# Patient Record
(demographics unavailable — no encounter records)

---

## 2018-08-03 NOTE — PULMONOLOGY PROGRESS NOTE
Assessment/Plan


Assessment/Plan


HPI


HPI


Patient is a 66-year-old male who presented after increased difficulty 

breathing and generalized weakness.  Patient gradual onset of symptoms.  He 

reports having prior history of COPD.  Patient reports recently stopping 

smoking.  But he states he been smoking up until a few days ago.  He reports 

having increased cough.  Reports having increased generalized weakness.  He 

states he has some unknown type of cardiac arrhythmia.  He's had previous 

cardiac catheterization.


Allergies:  


Coded Allergies:  


     AMPICILLIN (Verified  Allergy, Unknown, 8/3/18)


Uncoded Allergies:  


     PENICILLIN (Allergy, Unknown, 8/3/18)





Patient History


Past Medical History:  COPD


Reviewed Nursing Documentation:  PMH: Agreed; PSxH: Agreed





Nursing Documentation-PMH


Past Medical History:  No History, Except For


Hx Cardiac Problems:  Yes - arrhythmia


Hx Hypertension:  No


Hx Pacemaker:  No


Hx Asthma:  Yes


Hx COPD:  Yes


Hx Diabetes:  No


Hx Cancer:  No


Hx Gastrointestinal Problems:  No


Hx Dialysis:  No


History Of Psychiatric Problem:  No


Hx Neurological Problems:  No


Hx Cerebrovascular Accident:  No


Hx Seizures:  No





Review of Systems


All Other Systems:  negative except mentioned in HPI





Physical Exam





Vital Signs








  Date Time  Temp Pulse Resp B/P (MAP) Pulse Ox O2 Delivery O2 Flow Rate FiO2


 


8/3/18 13:53 96.0 127 22 133/66 86 Room Air  





 96.1       








Sp02 EP Interpretation:  reviewed, normal


General Appearance:  normal inspection, alert, GCS 15, moderate distress, thin, 

Chronically Ill


Head:  atraumatic


ENT:  normal ENT inspection, hearing grossly normal, normal voice


Neck:  normal inspection, supple, no bony tend, limited range of motion


Respiratory:  respiratory distress, accessory muscle use, wheezing


Cardiovascular #1:  no edema, irregularly irregular


Gastrointestinal:  normal inspection, normal bowel sounds, non tender, soft, no 

guarding, no hernia


Genitourinary:  no CVA tenderness


Musculoskeletal:  normal inspection, back normal, normal range of motion


Neurologic:  normal inspection, alert, oriented x3, responsive, speech normal


Psychiatric:  normal inspection, judgement/insight normal, mood/affect normal


Skin:  normal inspection, normal color, no rash





Medical Decision Making


ER Course


Patient is a for generalized weakness and shortness of breath.  Differential 

included but was not limited to anemia, pneumonia, pneumothorax, myocardial 

infarction, pericardial effusion, congestive heart failure, acidosis.  Because 

of complexity of patient's case laboratory testing and imaging studies were 

ordered.





Last Vital Signs








  Date Time  Temp Pulse Resp B/P (MAP) Pulse Ox O2 Delivery O2 Flow Rate FiO2


 


8/3/18 13:53 96.0 127 22 133/66 86 Room Air  





 96.1       








Assessment/Plan








#Acute decompensated CHF


#COPD exacerbation 


#Possible Viral URI


#Transaminitis (? hepatic congestion)


#HTN


#HLD





- diuresis PRN


- monitor labs


- PTA meds


- nebs q4h atc


- prednisone 40 po daily x 5 days 


- supplemental 02 sats 90-94%


- supportive care


- dvt ppx: SQ Heparin 5000 BID


- full code








I spent 75 min on this case, 43 min was dedicated to counseling and care 

coordination





Subjective


ROS Limited/Unobtainable:  No


Respiratory:  Reports: shortness of breath


Allergies:  


Coded Allergies:  


     AMPICILLIN (Verified  Allergy, Unknown, 8/3/18)


Uncoded Allergies:  


     PENICILLIN (Allergy, Unknown, 8/3/18)





Objective





Last 24 Hour Vital Signs








  Date Time  Temp Pulse Resp B/P (MAP) Pulse Ox O2 Delivery O2 Flow Rate FiO2


 


8/3/18 17:57 96.1       


 


8/3/18 16:35 96.1 79 21 112/65 96 Room Air  





 96.1       


 


8/3/18 14:51  86 20  98 Room Air  


 


8/3/18 14:41  84 22  92 Room Air  


 


8/3/18 14:40  84 22   Room Air  


 


8/3/18 14:10 96.1 89 29 112/76 97 Room Air  





 96.1       


 


8/3/18 13:53 96.0 127 22 133/66 86 Room Air  





 96.1       








Laboratory Tests


8/3/18 14:22: 


White Blood Count 4.9, Red Blood Count 5.33, Hemoglobin 14.7, Hematocrit 44.2, 

Mean Corpuscular Volume 83, Mean Corpuscular Hemoglobin 27.5, Mean Corpuscular 

Hemoglobin Concent 33.2, Red Cell Distribution Width 11.8, Platelet Count 101L, 

Mean Platelet Volume 8.9, Neutrophils (%) (Auto) 81.2H, Lymphocytes (%) (Auto) 

9.4L, Monocytes (%) (Auto) 8.7, Eosinophils (%) (Auto) 0.0, Basophils (%) (Auto

) 0.7, Prothrombin Time 11.0, Prothromb Time International Ratio 1.0, Activated 

Partial Thromboplast Time 32, Sodium Level 136, Potassium Level 3.8, Chloride 

Level 97L, Carbon Dioxide Level 32, Anion Gap 7, Blood Urea Nitrogen 30H, 

Creatinine 1.3, Estimat Glomerular Filtration Rate 55.2, Glucose Level 121H, 

Calcium Level 8.9, Total Bilirubin 3.1H, Direct Bilirubin 2.2H, Aspartate Amino 

Transf (AST/SGOT) 68H, Alanine Aminotransferase (ALT/SGPT) 112H, Alkaline 

Phosphatase 179H, Troponin I 0.035, C-Reactive Protein, Quantitative 18.0H, Pro-

B-Type Natriuretic Peptide 2106H, Total Protein 7.2, Albumin 3.1L, Globulin 4.1

, Albumin/Globulin Ratio 0.8L, Thyroid Stimulating Hormone (TSH) 2.071


8/3/18 16:00: 


Urine Color Yellow, Urine Appearance Clear, Urine pH 6.5, Urine Specific 

Gravity 1.010, Urine Protein Negative, Urine Glucose (UA) Negative, Urine 

Ketones Negative, Urine Occult Blood Negative, Urine Nitrite Negative, Urine 

Bilirubin Negative, Urine Urobilinogen 4H, Urine Leukocyte Esterase Negative, 

Urine RBC 0, Urine WBC 0-2, Urine Squamous Epithelial Cells None, Urine 

Bacteria None, Urine Opiates Screen Negative, Urine Barbiturates Screen Negative

, Phencyclidine (PCP) Screen Negative, Urine Amphetamines Screen Negative, 

Urine Benzodiazepines Screen Negative, Urine Cocaine Screen Negative, Urine 

Marijuana (THC) Screen PositiveH





Current Medications








 Medications


  (Trade)  Dose


 Ordered  Sig/Marissa


 Route


 PRN Reason  Start Time


 Stop Time Status Last Admin


Dose Admin


 


 Acetaminophen


  (Tylenol)  650 mg  Q4H  PRN


 ORAL


 Mild Pain (Pain Scale 1-3)  8/3/18 16:00


 9/2/18 15:59  8/3/18 17:57


 


 


 Acetaminophen


  (Tylenol)  650 mg  Q4H  PRN


 ORAL


 fever  8/3/18 16:00


 9/2/18 15:59   


 


 


 Albuterol/


 Ipratropium


  (Albuterol/


 Ipratropium)  3 ml  Q4HRT


 HHN


   8/3/18 16:15


 8/8/18 16:14   


 


 


 Aspirin


  (ASA)  81 mg  DAILY


 ORAL


   8/4/18 09:00


 9/3/18 08:59   


 


 


 Atorvastatin


 Calcium


  (Lipitor)  80 mg  BEDTIME


 ORAL


   8/3/18 21:00


 9/2/18 20:59   


 


 


 Carvedilol


  (Coreg)  12.5 mg  EVERY 12  HOURS


 ORAL


   8/3/18 21:00


 9/2/18 20:59   


 


 


 Dextrose


  (Dextrose 50%)  25 ml  STAT  PRN


 IV


 Hypoglycemia  8/3/18 16:00


 9/2/18 15:59   


 


 


 Dextrose


  (Dextrose 50%)  50 ml  STAT  PRN


 IV


 Hypoglycemia  8/3/18 16:00


 9/2/18 15:59   


 


 


 Docusate Sodium


  (Colace)  100 mg  EVERY 12  HOURS


 ORAL


   8/3/18 21:00


 9/2/18 20:59   


 


 


 Famotidine


  (Pepcid)  20 mg  BID


 ORAL


   8/3/18 18:00


 9/2/18 17:59  8/3/18 17:49


 


 


 Heparin Sodium


  (Porcine)


  (Heparin 5000


 units/ml)  5,000 units  EVERY 12  HOURS


 SUBQ


   8/3/18 21:00


 9/2/18 20:59   


 


 


 Morphine Sulfate


  (Morphine


 Sulfate)  2 mg  Q4H  PRN


 IVP


 Moderate Pain (Pain Scale 4-6)  8/3/18 16:00


 8/10/18 15:59   


 


 


 Morphine Sulfate


  (Morphine


 Sulfate)  4 mg  Q4H  PRN


 IVP


 Severe Pain (Pain Scale 7-10)  8/3/18 16:00


 8/10/18 15:59   


 


 


 Ondansetron HCl


  (Zofran)  4 mg  Q6H  PRN


 IVP


 Nausea & Vomiting  8/3/18 16:00


 9/2/18 15:59   


 


 


 Prednisone


  (predniSONE)  40 mg  DAILY


 ORAL


   8/4/18 09:00


 8/8/18 09:01   


 

















Jonathan Allred MD Aug 3, 2018 18:41

## 2018-08-03 NOTE — EMERGENCY ROOM REPORT
History of Present Illness


General


Chief Complaint:  Generalized Weakness


Source:  Patient





Present Illness


HPI


Patient is a 66-year-old male who presented after increased difficulty 

breathing and generalized weakness.  Patient gradual onset of symptoms.  He 

reports having prior history of COPD.  Patient reports recently stopping 

smoking.  But he states he been smoking up until a few days ago.  He reports 

having increased cough.  Reports having increased generalized weakness.  He 

states he has some unknown type of cardiac arrhythmia.  He's had previous 

cardiac catheterization.


Allergies:  


Coded Allergies:  


     AMPICILLIN (Verified  Allergy, Unknown, 8/3/18)


Uncoded Allergies:  


     PENICILLIN (Allergy, Unknown, 8/3/18)





Patient History


Past Medical History:  COPD


Reviewed Nursing Documentation:  PMH: Agreed; PSxH: Agreed





Nursing Documentation-PMH


Past Medical History:  No History, Except For


Hx Cardiac Problems:  Yes - arrhythmia


Hx Hypertension:  No


Hx Pacemaker:  No


Hx Asthma:  Yes


Hx COPD:  Yes


Hx Diabetes:  No


Hx Cancer:  No


Hx Gastrointestinal Problems:  No


Hx Dialysis:  No


History Of Psychiatric Problem:  No


Hx Neurological Problems:  No


Hx Cerebrovascular Accident:  No


Hx Seizures:  No





Review of Systems


All Other Systems:  negative except mentioned in HPI





Physical Exam





Vital Signs








  Date Time  Temp Pulse Resp B/P (MAP) Pulse Ox O2 Delivery O2 Flow Rate FiO2


 


8/3/18 13:53 96.0 127 22 133/66 86 Room Air  





 96.1       








Sp02 EP Interpretation:  reviewed, normal


General Appearance:  normal inspection, alert, GCS 15, moderate distress, thin, 

Chronically Ill


Head:  atraumatic


ENT:  normal ENT inspection, hearing grossly normal, normal voice


Neck:  normal inspection, supple, no bony tend, limited range of motion


Respiratory:  respiratory distress, accessory muscle use, wheezing


Cardiovascular #1:  no edema, irregularly irregular


Gastrointestinal:  normal inspection, normal bowel sounds, non tender, soft, no 

guarding, no hernia


Genitourinary:  no CVA tenderness


Musculoskeletal:  normal inspection, back normal, normal range of motion


Neurologic:  normal inspection, alert, oriented x3, responsive, speech normal


Psychiatric:  normal inspection, judgement/insight normal, mood/affect normal


Skin:  normal inspection, normal color, no rash





Medical Decision Making


Diagnostic Impression:  


 Primary Impression:  


 Episode of generalized weakness


 Additional Impressions:  


 COPD (chronic obstructive pulmonary disease)


 CHF (congestive heart failure)


 Atrial fibrillation


ER Course


Patient is a for generalized weakness and shortness of breath.  Differential 

included but was not limited to anemia, pneumonia, pneumothorax, myocardial 

infarction, pericardial effusion, congestive heart failure, acidosis.  Because 

of complexity of patient's case laboratory testing and imaging studies were 

ordered.Patient was given IV Lasix as well as oral antibiotics the patient was 

given breathing treatments as well as IV magnesium.  Patient was discussed with 

Dr. Raghav Alejo for Dr. Louise for inpatient management





Labs








Test


  8/3/18


14:22 8/3/18


16:00 8/3/18


19:45


 


White Blood Count


  4.9 K/UL


(4.8-10.8) 


  


 


 


Red Blood Count


  5.33 M/UL


(4.70-6.10) 


  


 


 


Hemoglobin


  14.7 G/DL


(14.2-18.0) 


  


 


 


Hematocrit


  44.2 %


(42.0-52.0) 


  


 


 


Mean Corpuscular Volume 83 FL (80-99)   


 


Mean Corpuscular Hemoglobin


  27.5 PG


(27.0-31.0) 


  


 


 


Mean Corpuscular Hemoglobin


Concent 33.2 G/DL


(32.0-36.0) 


  


 


 


Red Cell Distribution Width


  11.8 %


(11.6-14.8) 


  


 


 


Platelet Count


  101 K/UL


(150-450) 


  


 


 


Mean Platelet Volume


  8.9 FL


(6.5-10.1) 


  


 


 


Neutrophils (%) (Auto)


  81.2 %


(45.0-75.0) 


  


 


 


Lymphocytes (%) (Auto)


  9.4 %


(20.0-45.0) 


  


 


 


Monocytes (%) (Auto)


  8.7 %


(1.0-10.0) 


  


 


 


Eosinophils (%) (Auto)


  0.0 %


(0.0-3.0) 


  


 


 


Basophils (%) (Auto)


  0.7 %


(0.0-2.0) 


  


 


 


Prothrombin Time


  11.0 SEC


(9.30-11.50) 


  


 


 


Prothromb Time International


Ratio 1.0 (0.9-1.1) 


  


  


 


 


Activated Partial


Thromboplast Time 32 SEC (23-33) 


  


  


 


 


Sodium Level


  136 MMOL/L


(136-145) 


  


 


 


Potassium Level


  3.8 MMOL/L


(3.5-5.1) 


  


 


 


Chloride Level


  97 MMOL/L


() 


  


 


 


Carbon Dioxide Level


  32 MMOL/L


(21-32) 


  


 


 


Anion Gap


  7 mmol/L


(5-15) 


  


 


 


Blood Urea Nitrogen


  30 mg/dL


(7-18) 


  


 


 


Creatinine


  1.3 MG/DL


(0.55-1.30) 


  


 


 


Estimat Glomerular Filtration


Rate 55.2 mL/min


(>60) 


  


 


 


Glucose Level


  121 MG/DL


() 


  


 


 


Calcium Level


  8.9 MG/DL


(8.5-10.1) 


  


 


 


Total Bilirubin


  3.1 MG/DL


(0.2-1.0) 


  


 


 


Direct Bilirubin


  2.2 MG/DL


(0.0-0.3) 


  


 


 


Aspartate Amino Transf


(AST/SGOT) 68 U/L (15-37) 


  


  


 


 


Alanine Aminotransferase


(ALT/SGPT) 112 U/L


(12-78) 


  


 


 


Alkaline Phosphatase


  179 U/L


() 


  


 


 


Troponin I


  0.035 ng/mL


(0.000-0.056) 


  


 


 


C-Reactive Protein,


Quantitative 18.0 mg/dL


(0.00-0.90) 


  


 


 


Pro-B-Type Natriuretic Peptide


  2106 pg/mL


(0-125) 


  


 


 


Total Protein


  7.2 G/DL


(6.4-8.2) 


  


 


 


Albumin


  3.1 G/DL


(3.4-5.0) 


  


 


 


Globulin 4.1 g/dL   


 


Albumin/Globulin Ratio 0.8 (1.0-2.7)   


 


Thyroid Stimulating Hormone


(TSH) 2.071 uiU/mL


(0.358-3.740) 


  


 


 


Urine RBC  0 /HPF (0 - 0)  


 


Urine WBC


  


  0-2 /HPF (0 -


0) 


 


 


Urine Squamous Epithelial


Cells 


  None /LPF


(NONE/OCC) 


 


 


Urine Bacteria


  


  None /HPF


(NONE) 


 


 


Urine Opiates Screen


  


  Negative


(NEGATIVE) 


 


 


Urine Barbiturates Screen


  


  Negative


(NEGATIVE) 


 


 


Phencyclidine (PCP) Screen


  


  Negative


(NEGATIVE) 


 


 


Urine Amphetamines Screen


  


  Negative


(NEGATIVE) 


 


 


Urine Benzodiazepines Screen


  


  Negative


(NEGATIVE) 


 


 


Urine Cocaine Screen


  


  Negative


(NEGATIVE) 


 


 


Urine Marijuana (THC) Screen


  


  Positive


(NEGATIVE) 


 


 


Urine Color   Pale yellow 


 


Urine Appearance   Clear 


 


Urine pH   6.5 (4.5-8.0) 


 


Urine Specific Gravity


  


  


  1.005


(1.005-1.035)


 


Urine Protein


  


  


  Negative


(NEGATIVE)


 


Urine Glucose (UA)   2+ (NEGATIVE) 


 


Urine Ketones


  


  


  Negative


(NEGATIVE)


 


Urine Occult Blood


  


  


  Negative


(NEGATIVE)


 


Urine Nitrite


  


  


  Negative


(NEGATIVE)


 


Urine Bilirubin


  


  


  Negative


(NEGATIVE)


 


Urine Urobilinogen


  


  


  Normal MG/DL


(0.0-1.0)


 


Urine Leukocyte Esterase


  


  


  Negative


(NEGATIVE)








EKG Diagnostic Results


Rate:  other - atrial fibrillation





Last Vital Signs








  Date Time  Temp Pulse Resp B/P (MAP) Pulse Ox O2 Delivery O2 Flow Rate FiO2


 


8/3/18 13:53 96.0 127 22 133/66 86 Room Air  





 96.1       








Status:  unchanged


Disposition:  ELOPED


Condition:  Stable











Chaz Jackson MD Aug 3, 2018 14:21

## 2018-08-03 NOTE — HISTORY AND PHYSICAL
History of Present Illness


General


Date patient seen:  Aug 3, 2018


Reason for Hospitalization:  Generalized Weakness





Present Illness


HPI


67 yo M w/ Hx MMP including COPD, CHF who presents to the ED w/ 1 mo of 

worsening Generalized weakness and one week of increased shortness of breath 

and FRITZ. Pt has been living in a board and [facility] and reports limited access to 

healthy food. Food provided at the facility is not c/w recommendations he has 

been given for a cardiac/low na diet. Over the past 2 days he has had little po 

intake due to anorexia and general malaise. At baseline he is able to walk for 

> 1 block on flat ground, but now is limited to 20-30 feet. He also reports 

Orthopnea. Pt felt like today his overall health had signifigantly declined 

prompting him to present to the ED. 





In the ED pt was found to be in decompensated CHF w/ elevated BNP and COPD 

exacerbation w/ exp wheeze. He was started on lasix IV, Steroids and nebs with 

some improvement in sob. 





Social Hx:


Lives in board and care


Denies ETOH use


Active smoker, 1 PPD, > 40 pack yr Hx (has not smoked in 3 days)


Allergies:  


Coded Allergies:  


     AMPICILLIN (Verified  Allergy, Unknown, 8/3/18)


Uncoded Allergies:  


     PENICILLIN (Allergy, Unknown, 8/3/18)





Medication History


Scheduled


Carvedilol (Coreg), 12.5 MG ORAL EVERY 12 HOURS, (Reported)


Furosemide (Furosemide), 40 MG PO DAILY, (Reported)


Rosuvastatin Calcium* (Crestor*), 5 MG ORAL DAILY, (Reported)





Patient History


Healthcare decision maker


N


Resuscitation status





Advanced Directive on File








Past Medical/Surgical History


Past Medical/Surgical History:  


(1) COPD (chronic obstructive pulmonary disease)


(2) Atrial fibrillation


(3) CHF (congestive heart failure)


(4) Episode of generalized weakness





Review of Systems


Constitutional:  Reports: malaise, weakness; Denies: no symptoms, see HPI, 

chills, sweats, fever, other


Eye:  Denies: no symptoms, see HPI, eye pain, blurred vision, tearing, double 

vision, nose pain, nose congestion, acuity changes, discharge, other


Respiratory:  Reports: see HPI, cough, orthopnea, shortness of breath, wheezing

, sputum; Denies: no symptoms, stridor, FRITZ, other


Cardiovascular:  Reports: see HPI, PND; Denies: no symptoms, chest pain, edema, 

palpitations, syncope, other


Gastrointestinal:  Reports: nausea; Denies: no symptoms, see HPI, abdominal pain

, constipation, diarrhea, vomiting, melena, hematemesis, other


Genitourinary:  Denies: no symptoms, see HPI, discharge, dysuria, frequency, 

hematuria, pain, retention, incontinence, urgency, vag bleed/dc, other


Musculoskeletal:  Denies: no symptoms, see HPI, back pain, gout, joint pain, 

joint swelling, muscle pain, muscle stiffness, other


Skin:  Denies: no symptoms, see HPI, rash, change in color, change in hair/nails

, dryness, lesions, other


Psychiatric:  Denies: no symptoms, see HPI, prior hx, anxiety, depressed 

feelings, emotional problems, SI, HI, hallucinations, other


Neurological:  Denies: no symptoms, see HPI, headache, numbness, paresthesia, 

seizure, tingling, tremors, focal weakness, syncope, dizziness, other


Endocrine:  Denies: no symptoms, see HPI, excessive sweating, flushing, 

intolerance to temperature, increased thirst, increased urine, unexplained 

weight loss, other


Hematologic/Lymphatic:  Denies: no symptoms, see HPI, anemia, blood clots, easy 

bleeding, easy bruising, swollen glands, diathesis, other





Physical Exam


General Appearance:  thin


HEENT:  normocephalic, atraumatic, anicteric, mucous membranes moist, PERRL


Neck:  non-tender, supple


Respiratory/Chest:  chest wall non-tender, lungs clear, no respiratory distress

, no accessory muscle use, expiratory wheezing


Cardiovascular/Chest:  normal rate, regular rhythm, regularly irregular, no 

gallop/murmur, JVD


Abdomen:  normal bowel sounds, non tender, soft, no organomegaly, no mass


Extremities:  normal range of motion, normal inspection, normal capillary refill


Neurologic:  CNs II-XII grossly normal, no motor/sensory deficits, abnormal gait

, alert, oriented x 3, normal mood/affect





Last 24 Hour Vital Signs








  Date Time  Temp Pulse Resp B/P (MAP) Pulse Ox O2 Delivery O2 Flow Rate FiO2


 


8/3/18 14:10 96.1 89 29 112/76 97 Room Air  





 96.1       


 


8/3/18 13:53 96.0 127 22 133/66 86 Room Air  





 96.1       











Laboratory Tests








Test


  8/3/18


14:22


 


White Blood Count


  4.9 K/UL


(4.8-10.8)


 


Red Blood Count


  5.33 M/UL


(4.70-6.10)


 


Hemoglobin


  14.7 G/DL


(14.2-18.0)


 


Hematocrit


  44.2 %


(42.0-52.0)


 


Mean Corpuscular Volume 83 FL (80-99)  


 


Mean Corpuscular Hemoglobin


  27.5 PG


(27.0-31.0)


 


Mean Corpuscular Hemoglobin


Concent 33.2 G/DL


(32.0-36.0)


 


Red Cell Distribution Width


  11.8 %


(11.6-14.8)


 


Platelet Count


  101 K/UL


(150-450)  L


 


Mean Platelet Volume


  8.9 FL


(6.5-10.1)


 


Neutrophils (%) (Auto)


  81.2 %


(45.0-75.0)  H


 


Lymphocytes (%) (Auto)


  9.4 %


(20.0-45.0)  L


 


Monocytes (%) (Auto)


  8.7 %


(1.0-10.0)


 


Eosinophils (%) (Auto)


  0.0 %


(0.0-3.0)


 


Basophils (%) (Auto)


  0.7 %


(0.0-2.0)


 


Prothrombin Time


  11.0 SEC


(9.30-11.50)


 


Prothromb Time International


Ratio 1.0 (0.9-1.1)  


 


 


Activated Partial


Thromboplast Time 32 SEC (23-33)


 


 


Sodium Level


  136 MMOL/L


(136-145)


 


Potassium Level


  3.8 MMOL/L


(3.5-5.1)


 


Chloride Level


  97 MMOL/L


()  L


 


Carbon Dioxide Level


  32 MMOL/L


(21-32)


 


Anion Gap


  7 mmol/L


(5-15)


 


Blood Urea Nitrogen


  30 mg/dL


(7-18)  H


 


Creatinine


  1.3 MG/DL


(0.55-1.30)


 


Estimat Glomerular Filtration


Rate 55.2 mL/min


(>60)


 


Glucose Level


  121 MG/DL


()  H


 


Calcium Level


  8.9 MG/DL


(8.5-10.1)


 


Total Bilirubin


  3.1 MG/DL


(0.2-1.0)  H


 


Direct Bilirubin


  2.2 MG/DL


(0.0-0.3)  H


 


Aspartate Amino Transf


(AST/SGOT) 68 U/L (15-37)


H


 


Alanine Aminotransferase


(ALT/SGPT) 112 U/L


(12-78)  H


 


Alkaline Phosphatase


  179 U/L


()  H


 


Troponin I


  0.035 ng/mL


(0.000-0.056)


 


C-Reactive Protein,


Quantitative 18.0 mg/dL


(0.00-0.90)  H


 


Pro-B-Type Natriuretic Peptide


  2106 pg/mL


(0-125)  H


 


Total Protein


  7.2 G/DL


(6.4-8.2)


 


Albumin


  3.1 G/DL


(3.4-5.0)  L


 


Globulin 4.1 g/dL  


 


Albumin/Globulin Ratio


  0.8 (1.0-2.7)


L


 


Thyroid Stimulating Hormone


(TSH) 2.071 uiU/mL


(0.358-3.740)








Height (Feet):  5


Height (Inches):  9.00


Weight (Pounds):  160


Medications





Current Medications








 Medications


  (Trade)  Dose


 Ordered  Sig/Marissa


 Route


 PRN Reason  Start Time


 Stop Time Status Last Admin


Dose Admin


 


 Albuterol/


 Ipratropium


  (Albuterol/


 Ipratropium)  3 ml  Q15M


 HHN


   8/3/18 14:30


 8/8/18 14:29  8/3/18 14:41


 











Assessment/Plan


Assessment/Plan


#Acute decompensated CHF


#COPD exacerbation 


#Transaminitis (? hepatic congestion)


#HTN


#HLD





- place in observation 


- cardiology consult


- telemetry monitor 


- trend tn/ekg


- lasix 40 IV BID, goal neg 1L / 24 hrs


- strict i/o's


- CHEM BID


- replace lytes as needed


- UA


- asa, coreg, statin 


- abd us


- Pulm consult 


- nebs q4h atc


- prednisone 40 po daily x 5 days 


- defer abx at this time


- repeat CXR in am 


- supplemental 02


- supportive care


- dvt ppx: HSQ BID


- full code





anticipate pt will require 1-2 days in house


anticipate dc home w/ HH vs SNF once medically stable





I spent 68 min on this case, 48 min was dedicated to counseling and care 

coordination





time of note may not reflect time of clinical encounter











Jay Alejo MD Aug 3, 2018 16:15

## 2018-08-03 NOTE — DIAGNOSTIC IMAGING REPORT
Indication: Shortness of breath

 

Technique: One view of the chest

 

Comparison: none

 

Findings: Lungs and pleural spaces are clear. Heart size is normal

 

Impression: No acute process

## 2018-08-04 NOTE — GENERAL PROGRESS NOTE
Assessment/Plan


Status:  progressing


Assessment/Plan


#Acute decompensated CHF - improved, diuresing 


#COPD exacerbation - improved on tx


#Transaminitis - improved, distended CBD on abd us


#Thrombocytopenia - new, no cirrhosis on US


#A fib


#MR


#HTN


#HLD





- admit to in pt


- cardiology consult, appreciate recs


- telemetry monitor 


- serial tn neg


- diuresis per cards


- strict i/o's


- trend chem


- replace lytes as needed


- UA non infectious


- apixaban BID


- plavix, coreg, statin 


- Pulm consult 


- doxy BID


- nebs q4h atc


- steroids per pulm


- supplemental 02


- trend LFTs


- GI consult 


- trend CBC (plt) 


- supportive care


- dvt ppx: HSQ BID


- full code





anticipate pt will require 2-3 days in house


anticipate dc home w/ HH vs SNF once medically stable





I spent 41 min on this case, 25 min was dedicated to counseling and care 

coordination





time of note may not reflect time of clinical encounter





Subjective


Date patient seen:  Aug 4, 2018


Allergies:  


Coded Allergies:  


     AMPICILLIN (Verified  Allergy, Unknown, 8/3/18)


Uncoded Allergies:  


     PENICILLIN (Allergy, Unknown, 8/3/18)


All Systems:  reviewed and negative except above - sob, general malaise


Subjective


no acute events


afeb and hds


evaluated by cards and pulm 


serial tn neg


LFTs improved


ABD us done





Objective





Last 24 Hour Vital Signs








  Date Time  Temp Pulse Resp B/P (MAP) Pulse Ox O2 Delivery O2 Flow Rate FiO2


 


8/4/18 11:37 96.3 86 19 121/57 (78) 99   





 96.3       


 


8/4/18 10:56  59 18  99 Room Air  


 


8/4/18 10:46  95 18  98 Room Air  21


 


8/4/18 08:59      Room Air  


 


8/4/18 08:00 97.8 54 18 125/55 (78) 97   





 97.8       


 


8/4/18 07:59      Room Air  


 


8/4/18 07:59      Room Air  


 


8/4/18 04:00  52      


 


8/4/18 04:00 97.6 54 18 121/52 (75) 98   





 97.6       


 


8/4/18 03:39  54 16  99 Room Air  


 


8/4/18 03:29  52 16  98 Room Air  21


 


8/4/18 00:08 97.0 54 18 126/56 (79) 97   





 97.0       


 


8/4/18 00:00  49 18  98 Room Air  


 


8/4/18 00:00  43      


 


8/3/18 23:49  47 18  97 Room Air  21


 


8/3/18 21:00  54  99/57    


 


8/3/18 21:00      Room Air  


 


8/3/18 20:10  61 18  99 Room Air  


 


8/3/18 20:00  63 18  97 Room Air  21


 


8/3/18 20:00 96.3 54 18 99/57 (71) 97   





 96.3       


 


8/3/18 20:00  55      


 


8/3/18 18:56 96.1       


 


8/3/18 17:57 96.1       


 


8/3/18 17:00      Room Air  


 


8/3/18 16:50  85      


 


8/3/18 16:35 96.1 79 21 112/65 96 Room Air  





 96.1       


 


8/3/18 14:51  86 20  98 Room Air  


 


8/3/18 14:41  84 22  92 Room Air  


 


8/3/18 14:40  84 22   Room Air  


 


8/3/18 14:10 96.1 89 29 112/76 97 Room Air  





 96.1       


 


8/3/18 13:53 96.0 127 22 133/66 86 Room Air  





 96.1       

















Intake and Output  


 


 8/3/18 8/4/18





 19:00 07:00


 


Intake Total 440 ml 150 ml


 


Balance 440 ml 150 ml


 


  


 


Intake Oral 440 ml 150 ml


 


# Voids  2








Laboratory Tests


8/3/18 14:22: 


White Blood Count 4.9, Red Blood Count 5.33, Hemoglobin 14.7, Hematocrit 44.2, 

Mean Corpuscular Volume 83, Mean Corpuscular Hemoglobin 27.5, Mean Corpuscular 

Hemoglobin Concent 33.2, Red Cell Distribution Width 11.8, Platelet Count 101L, 

Mean Platelet Volume 8.9, Neutrophils (%) (Auto) 81.2H, Lymphocytes (%) (Auto) 

9.4L, Monocytes (%) (Auto) 8.7, Eosinophils (%) (Auto) 0.0, Basophils (%) (Auto

) 0.7, Prothrombin Time 11.0, Prothromb Time International Ratio 1.0, Activated 

Partial Thromboplast Time 32, Sodium Level 136, Potassium Level 3.8, Chloride 

Level 97L, Carbon Dioxide Level 32, Anion Gap 7, Blood Urea Nitrogen 30H, 

Creatinine 1.3, Estimat Glomerular Filtration Rate 55.2, Glucose Level 121H, 

Calcium Level 8.9, Total Bilirubin 3.1H, Direct Bilirubin 2.2H, Aspartate Amino 

Transf (AST/SGOT) 68H, Alanine Aminotransferase (ALT/SGPT) 112H, Alkaline 

Phosphatase 179H, Troponin I 0.035, C-Reactive Protein, Quantitative 18.0H, Pro-

B-Type Natriuretic Peptide 2106H, Total Protein 7.2, Albumin 3.1L, Globulin 4.1

, Albumin/Globulin Ratio 0.8L, Thyroid Stimulating Hormone (TSH) 2.071


8/3/18 16:00: 


Urine Color Yellow, Urine Appearance Clear, Urine pH 6.5, Urine Specific 

Gravity 1.010, Urine Protein Negative, Urine Glucose (UA) Negative, Urine 

Ketones Negative, Urine Occult Blood Negative, Urine Nitrite Negative, Urine 

Bilirubin Negative, Urine Urobilinogen 4H, Urine Leukocyte Esterase Negative, 

Urine RBC 0, Urine WBC 0-2, Urine Squamous Epithelial Cells None, Urine 

Bacteria None, Urine Opiates Screen Negative, Urine Barbiturates Screen Negative

, Phencyclidine (PCP) Screen Negative, Urine Amphetamines Screen Negative, 

Urine Benzodiazepines Screen Negative, Urine Cocaine Screen Negative, Urine 

Marijuana (THC) Screen PositiveH


8/3/18 19:45: 


Urine Color Pale yellow, Urine Appearance Clear, Urine pH 6.5, Urine Specific 

Gravity 1.005, Urine Protein Negative, Urine Glucose (UA) 2+H, Urine Ketones 

Negative, Urine Occult Blood Negative, Urine Nitrite Negative, Urine Bilirubin 

Negative, Urine Urobilinogen Normal, Urine Leukocyte Esterase Negative


8/3/18 22:36: Troponin I 0.013


8/4/18 06:10: 


White Blood Count 3.0L, Red Blood Count 4.95, Hemoglobin 14.3, Hematocrit 40.9L

, Mean Corpuscular Volume 83, Mean Corpuscular Hemoglobin 28.9, Mean 

Corpuscular Hemoglobin Concent 34.9, Red Cell Distribution Width 11.7, Platelet 

Count 89L, Mean Platelet Volume 9.3, Neutrophils (%) (Auto) , Lymphocytes (%) (

Auto) , Monocytes (%) (Auto) , Eosinophils (%) (Auto) , Basophils (%) (Auto) , 

Differential Total Cells Counted 100, Neutrophils % (Manual) 76H, Lymphocytes % 

(Manual) 15L, Monocytes % (Manual) 9, Eosinophils % (Manual) 0, Basophils % (

Manual) 0, Band Neutrophils 0, Platelet Estimate DecreasedL, Platelet 

Morphology Normal, Red Blood Cell Morphology Normal, Sodium Level 137, 

Potassium Level 3.7, Chloride Level 98, Carbon Dioxide Level 30, Anion Gap 9, 

Blood Urea Nitrogen 39H, Creatinine 1.2, Estimat Glomerular Filtration Rate > 60

, Glucose Level 271#H, Calcium Level 9.0, Total Bilirubin 1.9H, Direct 

Bilirubin 1.3H, Aspartate Amino Transf (AST/SGOT) 46H, Alanine Aminotransferase 

(ALT/SGPT) 90H, Alkaline Phosphatase 164H, Troponin I 0.000, Total Protein 7.0, 

Albumin 2.8L, Globulin 4.2, Albumin/Globulin Ratio 0.7L


Height (Feet):  5


Height (Inches):  9.00


Weight (Pounds):  160


General Appearance:  WD/WN, no apparent distress, alert


EENT:  PERRL/EOMI


Neck:  non-tender, supple


Cardiovascular:  normal peripheral pulses, normal rate, regularly irregular, no 

gallop/murmur, JVD


Respiratory/Chest:  lungs clear, normal breath sounds, no respiratory distress, 

no accessory muscle use


Abdomen:  non tender, soft


Neurologic:  CNs II-XII grossly normal, no motor/sensory deficits, abnormal gait

, alert, oriented x 3, responsive, normal mood/affect











Jay Alejo MD Aug 4, 2018 13:26

## 2018-08-04 NOTE — CARDIOLOGY PROGRESS NOTE
Assessment/Plan


Assessment/Plan


perm afib 


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 


hs of cm with subsequent resolution 


copd 


abn lft new 


thrombocytopna 


new 


cad s/p pci 


chronic chf 


MR 








chf sx seem to be mild 


he has noted to have resolution of his cm in 6/2018at [facility] will confirm with 

echo 


if ef has indeed improved then he will nto be a candidate of icd implantation 


his jorge l is nto severe however he may have tachy jorge l and may need back up 

pacing in form of a lead less pacemaker implantation 


if indeed he has sig MR should be considered for hermelindo clip as well 


awiat echo  woudl consider switch to po lasix with in the next 24 hours 


he dislike his persent facility which is one of the main reasons he came to er








thank  you 





[id number]





Objective





Last 24 Hour Vital Signs








  Date Time  Temp Pulse Resp B/P (MAP) Pulse Ox O2 Delivery O2 Flow Rate FiO2


 


8/4/18 10:46  95 18  98 Room Air  21


 


8/4/18 08:59      Room Air  


 


8/4/18 08:00 97.8 54 18 125/55 (78) 97   





 97.8       


 


8/4/18 07:59      Room Air  


 


8/4/18 07:59      Room Air  


 


8/4/18 04:00  52      


 


8/4/18 04:00 97.6 54 18 121/52 (75) 98   





 97.6       


 


8/4/18 03:39  54 16  99 Room Air  


 


8/4/18 03:29  52 16  98 Room Air  21


 


8/4/18 00:08 97.0 54 18 126/56 (79) 97   





 97.0       


 


8/4/18 00:00  49 18  98 Room Air  


 


8/4/18 00:00  43      


 


8/3/18 23:49  47 18  97 Room Air  21


 


8/3/18 21:00  54  99/57    


 


8/3/18 21:00      Room Air  


 


8/3/18 20:10  61 18  99 Room Air  


 


8/3/18 20:00  63 18  97 Room Air  21


 


8/3/18 20:00 96.3 54 18 99/57 (71) 97   





 96.3       


 


8/3/18 20:00  55      


 


8/3/18 18:56 96.1       


 


8/3/18 17:57 96.1       


 


8/3/18 17:00      Room Air  


 


8/3/18 16:50  85      


 


8/3/18 16:35 96.1 79 21 112/65 96 Room Air  





 96.1       


 


8/3/18 14:51  86 20  98 Room Air  


 


8/3/18 14:41  84 22  92 Room Air  


 


8/3/18 14:40  84 22   Room Air  


 


8/3/18 14:10 96.1 89 29 112/76 97 Room Air  





 96.1       


 


8/3/18 13:53 96.0 127 22 133/66 86 Room Air  





 96.1       

















Intake and Output  


 


 8/3/18 8/4/18





 19:00 07:00


 


Intake Total 440 ml 150 ml


 


Balance 440 ml 150 ml


 


  


 


Intake Oral 440 ml 150 ml


 


# Voids  2











Laboratory Tests








Test


  8/3/18


14:22 8/3/18


16:00 8/3/18


19:45 8/3/18


22:36


 


White Blood Count


  4.9 K/UL


(4.8-10.8) 


  


  


 


 


Red Blood Count


  5.33 M/UL


(4.70-6.10) 


  


  


 


 


Hemoglobin


  14.7 G/DL


(14.2-18.0) 


  


  


 


 


Hematocrit


  44.2 %


(42.0-52.0) 


  


  


 


 


Mean Corpuscular Volume 83 FL (80-99)     


 


Mean Corpuscular Hemoglobin


  27.5 PG


(27.0-31.0) 


  


  


 


 


Mean Corpuscular Hemoglobin


Concent 33.2 G/DL


(32.0-36.0) 


  


  


 


 


Red Cell Distribution Width


  11.8 %


(11.6-14.8) 


  


  


 


 


Platelet Count


  101 K/UL


(150-450)  L 


  


  


 


 


Mean Platelet Volume


  8.9 FL


(6.5-10.1) 


  


  


 


 


Neutrophils (%) (Auto)


  81.2 %


(45.0-75.0)  H 


  


  


 


 


Lymphocytes (%) (Auto)


  9.4 %


(20.0-45.0)  L 


  


  


 


 


Monocytes (%) (Auto)


  8.7 %


(1.0-10.0) 


  


  


 


 


Eosinophils (%) (Auto)


  0.0 %


(0.0-3.0) 


  


  


 


 


Basophils (%) (Auto)


  0.7 %


(0.0-2.0) 


  


  


 


 


Prothrombin Time


  11.0 SEC


(9.30-11.50) 


  


  


 


 


Prothromb Time International


Ratio 1.0 (0.9-1.1)  


  


  


  


 


 


Activated Partial


Thromboplast Time 32 SEC (23-33)


  


  


  


 


 


Sodium Level


  136 MMOL/L


(136-145) 


  


  


 


 


Potassium Level


  3.8 MMOL/L


(3.5-5.1) 


  


  


 


 


Chloride Level


  97 MMOL/L


()  L 


  


  


 


 


Carbon Dioxide Level


  32 MMOL/L


(21-32) 


  


  


 


 


Anion Gap


  7 mmol/L


(5-15) 


  


  


 


 


Blood Urea Nitrogen


  30 mg/dL


(7-18)  H 


  


  


 


 


Creatinine


  1.3 MG/DL


(0.55-1.30) 


  


  


 


 


Estimat Glomerular Filtration


Rate 55.2 mL/min


(>60) 


  


  


 


 


Glucose Level


  121 MG/DL


()  H 


  


  


 


 


Calcium Level


  8.9 MG/DL


(8.5-10.1) 


  


  


 


 


Total Bilirubin


  3.1 MG/DL


(0.2-1.0)  H 


  


  


 


 


Direct Bilirubin


  2.2 MG/DL


(0.0-0.3)  H 


  


  


 


 


Aspartate Amino Transf


(AST/SGOT) 68 U/L (15-37)


H 


  


  


 


 


Alanine Aminotransferase


(ALT/SGPT) 112 U/L


(12-78)  H 


  


  


 


 


Alkaline Phosphatase


  179 U/L


()  H 


  


  


 


 


Troponin I


  0.035 ng/mL


(0.000-0.056) 


  


  0.013 ng/mL


(0.000-0.056)


 


C-Reactive Protein,


Quantitative 18.0 mg/dL


(0.00-0.90)  H 


  


  


 


 


Pro-B-Type Natriuretic Peptide


  2106 pg/mL


(0-125)  H 


  


  


 


 


Total Protein


  7.2 G/DL


(6.4-8.2) 


  


  


 


 


Albumin


  3.1 G/DL


(3.4-5.0)  L 


  


  


 


 


Globulin 4.1 g/dL     


 


Albumin/Globulin Ratio


  0.8 (1.0-2.7)


L 


  


  


 


 


Thyroid Stimulating Hormone


(TSH) 2.071 uiU/mL


(0.358-3.740) 


  


  


 


 


Urine Color  Yellow   Pale yellow   


 


Urine Appearance  Clear   Clear   


 


Urine pH  6.5 (4.5-8.0)   6.5 (4.5-8.0)   


 


Urine Specific Gravity


  


  1.010


(1.005-1.035) 1.005


(1.005-1.035) 


 


 


Urine Protein


  


  Negative


(NEGATIVE) Negative


(NEGATIVE) 


 


 


Urine Glucose (UA)


  


  Negative


(NEGATIVE) 2+ (NEGATIVE)


H 


 


 


Urine Ketones


  


  Negative


(NEGATIVE) Negative


(NEGATIVE) 


 


 


Urine Occult Blood


  


  Negative


(NEGATIVE) Negative


(NEGATIVE) 


 


 


Urine Nitrite


  


  Negative


(NEGATIVE) Negative


(NEGATIVE) 


 


 


Urine Bilirubin


  


  Negative


(NEGATIVE) Negative


(NEGATIVE) 


 


 


Urine Urobilinogen


  


  4 MG/DL


(0.0-1.0)  H Normal MG/DL


(0.0-1.0) 


 


 


Urine Leukocyte Esterase


  


  Negative


(NEGATIVE) Negative


(NEGATIVE) 


 


 


Urine RBC


  


  0 /HPF (0 - 0)


  


  


 


 


Urine WBC


  


  0-2 /HPF (0 -


0) 


  


 


 


Urine Squamous Epithelial


Cells 


  None /LPF


(NONE/OCC) 


  


 


 


Urine Bacteria


  


  None /HPF


(NONE) 


  


 


 


Urine Opiates Screen


  


  Negative


(NEGATIVE) 


  


 


 


Urine Barbiturates Screen


  


  Negative


(NEGATIVE) 


  


 


 


Phencyclidine (PCP) Screen


  


  Negative


(NEGATIVE) 


  


 


 


Urine Amphetamines Screen


  


  Negative


(NEGATIVE) 


  


 


 


Urine Benzodiazepines Screen


  


  Negative


(NEGATIVE) 


  


 


 


Urine Cocaine Screen


  


  Negative


(NEGATIVE) 


  


 


 


Urine Marijuana (THC) Screen


  


  Positive


(NEGATIVE)  H 


  


 


 


Test


  8/4/18


06:10 


  


  


 


 


White Blood Count


  3.0 K/UL


(4.8-10.8)  L 


  


  


 


 


Red Blood Count


  4.95 M/UL


(4.70-6.10) 


  


  


 


 


Hemoglobin


  14.3 G/DL


(14.2-18.0) 


  


  


 


 


Hematocrit


  40.9 %


(42.0-52.0)  L 


  


  


 


 


Mean Corpuscular Volume 83 FL (80-99)     


 


Mean Corpuscular Hemoglobin


  28.9 PG


(27.0-31.0) 


  


  


 


 


Mean Corpuscular Hemoglobin


Concent 34.9 G/DL


(32.0-36.0) 


  


  


 


 


Red Cell Distribution Width


  11.7 %


(11.6-14.8) 


  


  


 


 


Platelet Count


  89 K/UL


(150-450)  L 


  


  


 


 


Mean Platelet Volume


  9.3 FL


(6.5-10.1) 


  


  


 


 


Neutrophils (%) (Auto)


  % (45.0-75.0)


  


  


  


 


 


Lymphocytes (%) (Auto)


  % (20.0-45.0)


  


  


  


 


 


Monocytes (%) (Auto)  % (1.0-10.0)     


 


Eosinophils (%) (Auto)  % (0.0-3.0)     


 


Basophils (%) (Auto)  % (0.0-2.0)     


 


Differential Total Cells


Counted 100  


  


  


  


 


 


Neutrophils % (Manual) 76 % (45-75)  H   


 


Lymphocytes % (Manual) 15 % (20-45)  L   


 


Monocytes % (Manual) 9 % (1-10)     


 


Eosinophils % (Manual) 0 % (0-3)     


 


Basophils % (Manual) 0 % (0-2)     


 


Band Neutrophils 0 % (0-8)     


 


Platelet Estimate Decreased  L   


 


Platelet Morphology Normal     


 


Red Blood Cell Morphology Normal     


 


Sodium Level


  137 MMOL/L


(136-145) 


  


  


 


 


Potassium Level


  3.7 MMOL/L


(3.5-5.1) 


  


  


 


 


Chloride Level


  98 MMOL/L


() 


  


  


 


 


Carbon Dioxide Level


  30 MMOL/L


(21-32) 


  


  


 


 


Anion Gap


  9 mmol/L


(5-15) 


  


  


 


 


Blood Urea Nitrogen


  39 mg/dL


(7-18)  H 


  


  


 


 


Creatinine


  1.2 MG/DL


(0.55-1.30) 


  


  


 


 


Estimat Glomerular Filtration


Rate > 60 mL/min


(>60) 


  


  


 


 


Glucose Level


  271 MG/DL


()  #H 


  


  


 


 


Calcium Level


  9.0 MG/DL


(8.5-10.1) 


  


  


 


 


Total Bilirubin


  1.9 MG/DL


(0.2-1.0)  H 


  


  


 


 


Direct Bilirubin


  1.3 MG/DL


(0.0-0.3)  H 


  


  


 


 


Aspartate Amino Transf


(AST/SGOT) 46 U/L (15-37)


H 


  


  


 


 


Alanine Aminotransferase


(ALT/SGPT) 90 U/L (12-78)


H 


  


  


 


 


Alkaline Phosphatase


  164 U/L


()  H 


  


  


 


 


Troponin I


  0.000 ng/mL


(0.000-0.056) 


  


  


 


 


Total Protein


  7.0 G/DL


(6.4-8.2) 


  


  


 


 


Albumin


  2.8 G/DL


(3.4-5.0)  L 


  


  


 


 


Globulin 4.2 g/dL     


 


Albumin/Globulin Ratio


  0.7 (1.0-2.7)


L 


  


  


 

















Zach Fonseca MD Aug 4, 2018 11:08

## 2018-08-04 NOTE — PULMONOLOGY PROGRESS NOTE
Assessment/Plan


Assessment/Plan


HPI


HPI


Patient is a 66-year-old male who presented after increased difficulty 

breathing and generalized weakness.  Patient gradual onset of symptoms.  He 

reports having prior history of COPD.  Patient reports recently stopping 

smoking.  But he states he been smoking up until a few days ago.  He reports 

having increased cough.  Reports having increased generalized weakness.  He 

states he has some unknown type of cardiac arrhythmia.  He's had previous 

cardiac catheterization.


Allergies:  


Coded Allergies:  


     AMPICILLIN (Verified  Allergy, Unknown, 8/3/18)


Uncoded Allergies:  


     PENICILLIN (Allergy, Unknown, 8/3/18)





Patient History


Past Medical History:  COPD


Reviewed Nursing Documentation:  PMH: Agreed; PSxH: Agreed





Nursing Documentation-PMH


Past Medical History:  No History, Except For


Hx Cardiac Problems:  Yes - arrhythmia


Hx Hypertension:  No


Hx Pacemaker:  No


Hx Asthma:  Yes


Hx COPD:  Yes


Hx Diabetes:  No


Hx Cancer:  No


Hx Gastrointestinal Problems:  No


Hx Dialysis:  No


History Of Psychiatric Problem:  No


Hx Neurological Problems:  No


Hx Cerebrovascular Accident:  No


Hx Seizures:  No





Review of Systems


All Other Systems:  negative except mentioned in HPI





Physical Exam





Vital Signs








  Date Time  Temp Pulse Resp B/P (MAP) Pulse Ox O2 Delivery O2 Flow Rate FiO2


 


8/3/18 13:53 96.0 127 22 133/66 86 Room Air  





 96.1       








Sp02 EP Interpretation:  reviewed, normal


General Appearance:  normal inspection, alert, GCS 15, moderate distress, thin, 

Chronically Ill


Head:  atraumatic


ENT:  normal ENT inspection, hearing grossly normal, normal voice


Neck:  normal inspection, supple, no bony tend, limited range of motion


Respiratory:  respiratory distress, accessory muscle use, wheezing


Cardiovascular #1:  no edema, irregularly irregular


Gastrointestinal:  normal inspection, normal bowel sounds, non tender, soft, no 

guarding, no hernia


Genitourinary:  no CVA tenderness


Musculoskeletal:  normal inspection, back normal, normal range of motion


Neurologic:  normal inspection, alert, oriented x3, responsive, speech normal


Psychiatric:  normal inspection, judgement/insight normal, mood/affect normal


Skin:  normal inspection, normal color, no rash





Medical Decision Making


ER Course


Patient is a for generalized weakness and shortness of breath.  Differential 

included but was not limited to anemia, pneumonia, pneumothorax, myocardial 

infarction, pericardial effusion, congestive heart failure, acidosis.  Because 

of complexity of patient's case laboratory testing and imaging studies were 

ordered.





Last Vital Signs








  Date Time  Temp Pulse Resp B/P (MAP) Pulse Ox O2 Delivery O2 Flow Rate FiO2


 


8/3/18 13:53 96.0 127 22 133/66 86 Room Air  





 96.1       








Assessment/Plan








#Acute decompensated CHF


#COPD exacerbation 


#Possible Viral URI


#Transaminitis (? hepatic congestion)


#HTN


#HLD





- diuresis PRN


- monitor labs


- PTA meds


- nebs q4h atc


-Continue current steroids


- supplemental 02 sats 90-94%


- supportive care


- dvt ppx: SQ Heparin 5000 BID


- full code








I spent 45 min on this case, 23 min was dedicated to counseling and care 

coordination





Subjective


ROS Limited/Unobtainable:  No


Allergies:  


Coded Allergies:  


     AMPICILLIN (Verified  Allergy, Unknown, 8/3/18)


Uncoded Allergies:  


     PENICILLIN (Allergy, Unknown, 8/3/18)





Objective





Last 24 Hour Vital Signs








  Date Time  Temp Pulse Resp B/P (MAP) Pulse Ox O2 Delivery O2 Flow Rate FiO2


 


8/4/18 15:21  56 18  100 Room Air  21 8/4/18 15:09  58 18  98 Room Air  21 8/4/18 12:00  65      


 


8/4/18 11:37 96.3 86 19 121/57 (78) 99   





 96.3       


 


8/4/18 10:56  59 18  99 Room Air  


 


8/4/18 10:46  95 18  98 Room Air  21 8/4/18 08:59      Room Air  


 


8/4/18 08:00 97.8 54 18 125/55 (78) 97   





 97.8       


 


8/4/18 08:00  51      


 


8/4/18 07:59      Room Air  


 


8/4/18 07:59      Room Air  


 


8/4/18 04:00  52      


 


8/4/18 04:00 97.6 54 18 121/52 (75) 98   





 97.6       


 


8/4/18 03:39  54 16  99 Room Air  


 


8/4/18 03:29  52 16  98 Room Air  21 8/4/18 00:08 97.0 54 18 126/56 (79) 97   





 97.0       


 


8/4/18 00:00  49 18  98 Room Air  


 


8/4/18 00:00  43      


 


8/3/18 23:49  47 18  97 Room Air  21


 


8/3/18 21:00  54  99/57    


 


8/3/18 21:00      Room Air  


 


8/3/18 20:10  61 18  99 Room Air  


 


8/3/18 20:00  63 18  97 Room Air  21


 


8/3/18 20:00 96.3 54 18 99/57 (71) 97   





 96.3       


 


8/3/18 20:00  55      


 


8/3/18 18:56 96.1       


 


8/3/18 17:57 96.1       


 


8/3/18 17:00      Room Air  


 


8/3/18 16:50  85      


 


8/3/18 16:35 96.1 79 21 112/65 96 Room Air  





 96.1       

















Intake and Output  


 


 8/3/18 8/4/18





 19:00 07:00


 


Intake Total 440 ml 150 ml


 


Balance 440 ml 150 ml


 


  


 


Intake Oral 440 ml 150 ml


 


# Voids  2








Laboratory Tests


8/3/18 16:00: 


Urine Color Yellow, Urine Appearance Clear, Urine pH 6.5, Urine Specific 

Gravity 1.010, Urine Protein Negative, Urine Glucose (UA) Negative, Urine 

Ketones Negative, Urine Occult Blood Negative, Urine Nitrite Negative, Urine 

Bilirubin Negative, Urine Urobilinogen 4H, Urine Leukocyte Esterase Negative, 

Urine RBC 0, Urine WBC 0-2, Urine Squamous Epithelial Cells None, Urine 

Bacteria None, Urine Opiates Screen Negative, Urine Barbiturates Screen Negative

, Phencyclidine (PCP) Screen Negative, Urine Amphetamines Screen Negative, 

Urine Benzodiazepines Screen Negative, Urine Cocaine Screen Negative, Urine 

Marijuana (THC) Screen PositiveH


8/3/18 19:45: 


Urine Color Pale yellow, Urine Appearance Clear, Urine pH 6.5, Urine Specific 

Gravity 1.005, Urine Protein Negative, Urine Glucose (UA) 2+H, Urine Ketones 

Negative, Urine Occult Blood Negative, Urine Nitrite Negative, Urine Bilirubin 

Negative, Urine Urobilinogen Normal, Urine Leukocyte Esterase Negative


8/3/18 22:36: Troponin I 0.013


8/4/18 06:10: 


Troponin I 0.000, White Blood Count 3.0L, Red Blood Count 4.95, Hemoglobin 14.3

, Hematocrit 40.9L, Mean Corpuscular Volume 83, Mean Corpuscular Hemoglobin 28.9

, Mean Corpuscular Hemoglobin Concent 34.9, Red Cell Distribution Width 11.7, 

Platelet Count 89L, Mean Platelet Volume 9.3, Neutrophils (%) (Auto) , 

Lymphocytes (%) (Auto) , Monocytes (%) (Auto) , Eosinophils (%) (Auto) , 

Basophils (%) (Auto) , Differential Total Cells Counted 100, Neutrophils % (

Manual) 76H, Lymphocytes % (Manual) 15L, Monocytes % (Manual) 9, Eosinophils % (

Manual) 0, Basophils % (Manual) 0, Band Neutrophils 0, Platelet Estimate 

DecreasedL, Platelet Morphology Normal, Red Blood Cell Morphology Normal, 

Sodium Level 137, Potassium Level 3.7, Chloride Level 98, Carbon Dioxide Level 

30, Anion Gap 9, Blood Urea Nitrogen 39H, Creatinine 1.2, Estimat Glomerular 

Filtration Rate > 60, Glucose Level 271#H, Calcium Level 9.0, Total Bilirubin 

1.9H, Direct Bilirubin 1.3H, Aspartate Amino Transf (AST/SGOT) 46H, Alanine 

Aminotransferase (ALT/SGPT) 90H, Alkaline Phosphatase 164H, Total Protein 7.0, 

Albumin 2.8L, Globulin 4.2, Albumin/Globulin Ratio 0.7L





Current Medications








 Medications


  (Trade)  Dose


 Ordered  Sig/Marissa


 Route


 PRN Reason  Start Time


 Stop Time Status Last Admin


Dose Admin


 


 Acetaminophen


  (Tylenol)  650 mg  Q4H  PRN


 ORAL


 Mild Pain (Pain Scale 1-3)  8/3/18 16:00


 9/2/18 15:59  8/3/18 17:57


 


 


 Acetaminophen


  (Tylenol)  650 mg  Q4H  PRN


 ORAL


 fever  8/3/18 16:00


 9/2/18 15:59   


 


 


 Albuterol/


 Ipratropium


  (Albuterol/


 Ipratropium)  3 ml  Q4HRT


 HHN


   8/3/18 16:15


 8/8/18 16:14  8/4/18 15:09


 


 


 Apixaban


  (Eliquis)  5 mg  BID


 ORAL


   8/4/18 18:00


 9/3/18 17:59   


 


 


 Aspirin


  (ASA)  81 mg  DAILY


 ORAL


   8/4/18 09:00


 9/3/18 08:59   


 


 


 Atorvastatin


 Calcium


  (Lipitor)  80 mg  BEDTIME


 ORAL


   8/3/18 21:00


 9/2/18 20:59  8/3/18 20:52


 


 


 Clopidogrel


 Bisulfate


  (Plavix)  75 mg  DAILY


 ORAL


   8/5/18 09:00


 9/4/18 08:59   


 


 


 Dextrose


  (Dextrose 50%)  25 ml  STAT  PRN


 IV


 Hypoglycemia  8/3/18 16:00


 9/2/18 15:59   


 


 


 Dextrose


  (Dextrose 50%)  50 ml  STAT  PRN


 IV


 Hypoglycemia  8/3/18 16:00


 9/2/18 15:59   


 


 


 Docusate Sodium


  (Colace)  100 mg  EVERY 12  HOURS


 ORAL


   8/3/18 21:00


 9/2/18 20:59  8/4/18 08:02


 


 


 Doxycycline


 Monohydrate


  (Vibramycin)  100 mg  EVERY 12  HOURS


 ORAL


   8/3/18 21:00


 8/10/18 20:59  8/4/18 08:03


 


 


 Famotidine


  (Pepcid)  20 mg  BID


 ORAL


   8/3/18 18:00


 9/2/18 17:59  8/4/18 08:02


 


 


 Furosemide


  (Lasix)  40 mg  DAILY


 ORAL


   8/5/18 09:00


 9/4/18 08:59   


 


 


 Methylprednisolone


 Sodium Succinate


  (Solu-MEDROL)  40 mg  EVERY 8  HOURS


 IVP


   8/3/18 22:00


 9/2/18 21:59  8/4/18 14:21


 


 


 Morphine Sulfate


  (Morphine


 Sulfate)  2 mg  Q4H  PRN


 IVP


 Moderate Pain (Pain Scale 4-6)  8/3/18 16:00


 8/10/18 15:59   


 


 


 Morphine Sulfate


  (Morphine


 Sulfate)  4 mg  Q4H  PRN


 IVP


 Severe Pain (Pain Scale 7-10)  8/3/18 16:00


 8/10/18 15:59   


 


 


 Ondansetron HCl


  (Zofran)  4 mg  Q6H  PRN


 IVP


 Nausea & Vomiting  8/3/18 16:00


 9/2/18 15:59   


 

















Jonathan Allred MD Aug 4, 2018 15:23

## 2018-08-04 NOTE — CONSULTATION
DATE OF CONSULTATION:  08/04/2018



CARDIOLOGY CONSULTATION



CONSULTING PHYSICIAN:  Zach Fonseca M.D.



REFERRING PHYSICIAN:  Dr. Jay Alejo.



REASON FOR REFERRAL:  Congestive heart failure and bradycardia.



HISTORY OF PRESENT ILLNESS:  This is an elderly gentleman, who is usually

followed at [facility] whose records I have had a chance to review.  The

patient's usual cardiologist, Dr. Eaton, recommended previously for the

patient to undergo electrophysiology evaluation for possibly an

intracardiac defibrillator implantation.  The patient has not really

gotten to that stage.  Apparently at his board and care facility, the food

that they serve him is he describes as terrible and he has had problem

with that.  He finally decided to come to the hospital because he was

feeling ill.  He really has a hard time indicating that the symptom that

he has been having specifically being admitted to the hospital is that he

does have some chronic shortness of breath, nothing acute.  He feels that

since he stopped smoking, the shortness of breath has gotten better.  He

uses one pillow at night.  He has no PND episodes.  He does limit his

activity.  When he does limit his activity, he denies having had any pain

in the chest with tightness, heaviness, or discomfort in the chest nor

having shortness of breath that limits his activities.



PAST MEDICAL HISTORY:  Extensive.  He has as mentioned history of atrial

fibrillation, chronic compensated congestive heart failure with ejection

fraction of 15% that reportedly improved to subsequently 60%.  He has

history of non-ST-elevation myocardial infarction, history of coronary

artery disease, status post PCI to LAD and right coronary artery in March 2018, hypertension systemic as well as pulmonary hypertension, history of

right ventricular systolic dysfunction, moderate-to-severe mitral

regurgitation, and tobacco use disorder as well as COPD, history of _____

syncope and fatty liver. Reported history of HCV as well.  Macular

degeneration.



ALLERGIES:  He does have allergies to penicillin.



SOCIAL HISTORY:  He smoked until recently and denies alcohol or drugs

according to his records.



REVIEW OF SYSTEMS:  GASTROINTESTINAL:  He has had some problems with color

in the stools, otherwise no black or tarry stools.  GENITOURINARY:  He

denies.  PULMONARY:  He does have some coughing symptoms and some

wheezing.  CONSTITUTIONAL:  He feels cold at times, but no fevers and no

night sweats.  NEUROLOGIC:  He has some macular degeneration.



PHYSICAL EXAMINATION:

GENERAL:  Shows to be an elderly gentleman, who is really lying down at

approximately 10 degrees of head-of-bed elevation in right lateral

decubitus position.  He does not appear to be in any kind of respiratory

distress.

NECK:  Supple.

LUNGS:  There are some decreased breath sounds, but no wheezes and no

crackles noted.

CARDIAC:  Distant heart sounds, somewhat irregular. No heaves or thrills

noted.

ABDOMEN:  Soft and nontender.  Positive bowel sounds.

EXTREMITIES:  There is no clubbing or cyanosis nor is there any edema.

NEUROLOGICAL:  He is awake, alert, responsive, and in no apparent

respiratory distress.



LABORATORY AND DIAGNOSTIC DATA:  White count is down to 3 with hemoglobin

14.3 and platelet count of 89,000 down from 101,000.  Sodium 137,

potassium 3.7, chloride 98, bicarbonate 30, BUN of 39, creatinine 1.2, and

glucose of 271 with bilirubin of 1.9, alkaline phosphatase of 164 with AST

and ALT in the 40s and 90s respectively.  All troponins 0.035, 0.013, and

0.00.  Subsequently, C-reactive protein was 18 with proBNP of 2100 only

with albumin of 2.8.  TSH of 2.071.  Coags, INR 1.0 and PTT of 32.

Urinalysis appears fairly unremarkable and tox screen performed at time of

admission, positive for marijuana.  A chest x-ray was performed in the

emergency room and indicates no acute processes.  Her [facility] data was

reviewed.  Creatinine between 1 to 1.3, most recently in July.  Three sets

of cardiac enzymes at that time were negative in July as well and his

platelet count was 167,000 at that time.  An echocardiogram was performed

on June 18 that showed ejection fraction to be 60%, hypokinesis of the

basal inferior wall with otherwise normal function.  It is to be noted

that ejection fraction has significantly improved from 20% previously down

up to about 60%.  The patient's electrocardiogram on telemetry data shows

atrial fibrillation with rapid ventricular response with some episodes of

bradycardia in the middle of the night, right bundle-branch conduction

defect was noted on his EKG with secondary T-wave abnormalities.



ASSESSMENT AND PLAN:

1. Permanent atrial fibrillation.

2. Bradycardia.

3. Coronary artery disease, status post multiple PCIs, last one in March 2018.

4. History of cardiomyopathy that subsequently seems to have resolved.

5. COPD.

6. History of tobacco use disorder.

7. Thrombocytopenia.

8. Abnormal liver function tests.

9. 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

chronic.

10. Chronic CHF.

11. Mitral regurgitation noted on echocardiogram.



Dr. Alejo, this patient was seen in cardiac consultation.  His CHF

symptoms seem to be mild at the present time.  He was noted to have

resolution of his cardiomyopathy back in June 2018 at [facility].  I will

confirm that with an echocardiogram repeated.  If his ejection fraction is

indeed improving, he will not be a candidate for ICD implantation.  His

bradycardia is not severe, however, he may have a component of

tachybradycardia and may need to have some _____ pacemaker to allow him to

take medications for the tachycardic episodes.  If indeed, he has

significant mitral regurgitation on his echocardiogram, he should be

considered for placement of a mitral clip to help with his CHF symptoms.

Await echocardiogram.  We will consider switching to p.o. Lasix within the

next 24 hours.  In addition, it should be noted that he dislikes the

present facility in which he is staying at and that was one of the main

reasons that he came to the emergency room as well.



Dr. Alejo, thank you for allowing me to participate in the care of this

patient.









  ______________________________________________

  Zach Fonseca M.D.





:  Ld

D:  08/04/2018 11:08

T:  08/04/2018 18:35

JOB#:  [id number]

CC:

## 2018-08-04 NOTE — DIAGNOSTIC IMAGING REPORT
EXAM:

  US Abdomen Complete

 

CLINICAL HISTORY:

  COPD

 

TECHNIQUE:

  Real-time ultrasound of the abdomen (complete) with image documentation.

 

COMPARISON:

  No relevant prior studies available.

 

FINDINGS:

  Liver:  No discrete mass demonstrated. Heterogeneous echo texture.

  Gallbladder:  Cholelithiasis and gallbladder sludge.  No gallbladder 

wall thickening.  Negative sonographic Antonio sign.

  Common bile duct:  Mildly distended common bile duct measuring 8.2 mm.

  Pancreas:  Pancreas is obscured.

  Kidneys:  No hydronephrosis.

  Spleen:  No splenomegaly.

  Aorta:  Unremarkable.  

  Inferior vena cava:  Unremarkable.

 

IMPRESSION:     

1.  Cholelithiasis and gallbladder sludge.

2.  Mildly distended common bile duct measuring 8.2 mm.

## 2018-08-04 NOTE — DIAGNOSTIC IMAGING REPORT
EXAM:

  XR Chest, 2 Views

 

CLINICAL HISTORY:

  SOB

 

TECHNIQUE:

  Frontal and lateral views of the chest.

 

COMPARISON:

  Chest x-ray 8-3-18

 

FINDINGS:

  Lungs:  Hyperinflated lungs of COPD..  Lungs are clear.

  Pleural space:  Unremarkable.  No pneumothorax.

  Heart:  Unremarkable.  No cardiomegaly.

  Mediastinum:  Unremarkable.

  Bones/joints:  Degenerative changes of the spine.

 

IMPRESSION:     

1.  Hyperinflated lungs of COPD.

2.  No acute process.

## 2018-08-05 NOTE — CARDIOLOGY REPORT
--------------- APPROVED REPORT --------------





EXAM: Two-dimensional and M-mode echocardiogram with Doppler and color 

Doppler.



INDICATION

Cardiomyopathy 



M-Mode DIMENSIONS 

IVSd1.1 (0.7-1.1cm)Left Atrium (MM)3.5 (1.6-4.0cm)

LVDd4.2 (3.5-5.6cm)Aortic Root2.5 (2.0-3.7cm)

PWd1.2 (0.7-1.1cm)Aortic Cusp Exc.1.6 (1.5-2.0cm)



LVDs1.6 (2.5-4.0cm)

PWs1.7 cm





Normal left ventricular chamber size, systolic function and wall motion.

Left ventricular ejection fraction estimated to be 60-65 %.

Borderline left ventricular hypertrophy.

Anterior Echo-free space, may be due to pericardial fat or effusion.

All other cardiac chamber sizes are within normal limits. 

Mild focal aortic valve sclerosis with adequate cusp excursion.

Mildly thickened mitral valve leaflets with normal excursion.

Mild mitral annulus and aortic root calcification.

Normal pulmonic valve structure. 

Normal tricuspid valve structure. 

IVC is normal in size with physiological collapse. 



A  color flow and spectral Doppler study was performed and revealed:

No aortic insufficiency.

Mild mitral regurgitation.

Left ventricular diastolic function could not be determined due to A-Fib.

Trace tricuspid regurgitation.

Tricuspid  systolic velocities suggests peak right ventricular systolic pressure of 11 

mmHg.

No pulmonic regurgitation present.

## 2018-08-05 NOTE — GENERAL PROGRESS NOTE
Assessment/Plan


Assessment/Plan


#Acute decompensated CHF - improved on tx


#COPD exacerbation - improved on tx


#Leukocytosis - suspect volume contraction


#Transaminitis - improved, distended CBD on abd us


#Thrombocytopenia - new, no cirrhosis on US


#A fib


#HTN


#HLD





- cardiology consult, appreciate recs


- telemetry monitor 


- serial tn neg


- diuresis per cards, now po lasix


- strict i/o's


- trend chem


- replace lytes as needed


- UA non infectious


- apixaban BID


- plavix, coreg, statin 


- Pulm consult, appreciate recs


- doxy BID


- nebs q4h atc


- steroids per pulm


- supplemental 02


- trend LFTs


- GI consult, awaiting eval


- gen surg consult 


- trend CBC


- supportive care


- dvt ppx: HSQ BID


- full code





anticipate pt will require 2-3 days in house


anticipate dc home w/ HH vs SNF once medically stable





I spent 41 min on this case, 25 min was dedicated to counseling and care 

coordination





time of note may not reflect time of clinical encounter





Subjective


Date patient seen:  Aug 5, 2018


Constitutional:  Reports: weakness


Respiratory:  Reports: shortness of breath


Allergies:  


Coded Allergies:  


     AMPICILLIN (Verified  Allergy, Unknown, 8/3/18)


Uncoded Allergies:  


     PENICILLIN (Allergy, Unknown, 8/3/18)


All Systems:  reviewed and negative except above


Subjective


no acute events


afeb


SBP 90


Lasix now PO


New leukocytosis, however all lines inc, ? contraction w/ overdiuresis


US w/ stones and dilated CBD





Objective





Last 24 Hour Vital Signs








  Date Time  Temp Pulse Resp B/P (MAP) Pulse Ox O2 Delivery O2 Flow Rate FiO2


 


8/5/18 15:04  65 16  98 Room Air  21


 


8/5/18 14:57  82 18  98 Room Air  21


 


8/5/18 13:55  83      





  68      





  70      


 


8/5/18 12:00 97.2 83 18 91/65 (74) 98   





 97.2       


 


8/5/18 11:14  68 18  98 Room Air  21


 


8/5/18 11:05  80 18  99 Room Air  21


 


8/5/18 09:14      Room Air  


 


8/5/18 08:00  75      


 


8/5/18 08:00 97.3 88 18 144/71 (95) 99   





 97.3       


 


8/5/18 07:56  67 18  98 Room Air  21


 


8/5/18 07:49  74 18  99 Room Air  21


 


8/5/18 04:00 97.7 98 18 128/53 (78) 98   





 97.7       


 


8/5/18 04:00  87      


 


8/5/18 03:08  64 18  100 Room Air  21


 


8/5/18 03:01  71 18  98 Room Air  21


 


8/5/18 00:00 97.0 91 20 132/79 (96) 98   





 97.0       


 


8/5/18 00:00  91      


 


8/4/18 23:06  59 18  100 Room Air  21


 


8/4/18 22:56  64 18  98 Room Air  21


 


8/4/18 21:00      Room Air  


 


8/4/18 20:02  61 18  100 Room Air  21


 


8/4/18 20:00  77      


 


8/4/18 20:00 95.7 71 20 155/75 (101) 99   





 95.7       


 


8/4/18 19:46  55 18  97 Room Air  21


 


8/4/18 16:00  77      


 


8/4/18 16:00 96.1 87 19 131/78 (95) 97   





 96.1       


 


8/4/18 15:21  56 18  100 Room Air  21

















Intake and Output  


 


 8/4/18 8/5/18





 19:00 07:00


 


Intake Total 580 ml 


 


Balance 580 ml 


 


  


 


Intake Oral 580 ml 


 


# Voids 3 4


 


# Bowel Movements  1








Laboratory Tests


8/5/18 06:00: 


White Blood Count 10.9#H, Red Blood Count 4.69L, Hemoglobin 13.2L, Hematocrit 

38.8L, Mean Corpuscular Volume 83, Mean Corpuscular Hemoglobin 28.2, Mean 

Corpuscular Hemoglobin Concent 34.1, Red Cell Distribution Width 11.8, Platelet 

Count 119L, Mean Platelet Volume 10.0, Neutrophils (%) (Auto) , Lymphocytes (%) 

(Auto) , Monocytes (%) (Auto) , Eosinophils (%) (Auto) , Basophils (%) (Auto) , 

Differential Total Cells Counted 100, Neutrophils % (Manual) 91H, Lymphocytes % 

(Manual) 6L, Monocytes % (Manual) 3, Eosinophils % (Manual) 0, Basophils % (

Manual) 0, Band Neutrophils 0, Platelet Estimate DecreasedL, Platelet 

Morphology Normal, Red Blood Cell Morphology Normal, Sodium Level 137, 

Potassium Level 3.6, Chloride Level 97L, Carbon Dioxide Level 34H, Anion Gap 6, 

Blood Urea Nitrogen 39H, Creatinine 1.3, Estimat Glomerular Filtration Rate 55.2

, Glucose Level 242H, Calcium Level 9.4, Total Bilirubin 1.3H, Direct Bilirubin 

0.9H, Aspartate Amino Transf (AST/SGOT) 43H, Alanine Aminotransferase (ALT/SGPT

) 95H, Alkaline Phosphatase 161H, Total Protein 7.2, Albumin 3.3L, Globulin 3.9

, Albumin/Globulin Ratio 0.8L


Height (Feet):  5


Height (Inches):  9.00


Weight (Pounds):  160


Objective


General Appearance:  WD/WN, no apparent distress, alert


EENT:  PERRL/EOMI


Neck:  non-tender, supple


Cardiovascular:  normal peripheral pulses, normal rate, regularly irregular, no 

gallop/murmur, JVD


Respiratory/Chest:  lungs clear, normal breath sounds, no respiratory distress, 

no accessory muscle use


Abdomen:  non tender, soft


Neurologic:  CNs II-XII grossly normal, no motor/sensory deficits, abnormal gait

, alert, oriented x 3, responsive, normal mood/affect











Jay Alejo MD Aug 5, 2018 15:25

## 2018-08-05 NOTE — CARDIOLOGY PROGRESS NOTE
Assessment/Plan


Assessment/Plan


perm afib 


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 


hs of cm with subsequent resolution 


copd 


abn lft new 


thrombocytopna 


new 


cad s/p pci 


chronic chf 


MR 








chf sx seem to be mild 


he has noted to have resolution of his cm in 6/2018at [facility] confirmed on echo 

here to day 





off bb but now heart rate mildy increasedsd 


tele reviewed 





will restirt coreg but at 1/2 prioor dsoe if remain sig tacy or flores may need 

pacing  


MR not confirmed on echo today 





swtich to low dose po laix 





he dislike his persent facility which is one of the main reasons he came to er





Subjective


Cardiovascular:  Reports: lightheadedness; Denies: chest pain, palpitations


Respiratory:  Denies: shortness of breath


Gastrointestinal/Abdominal:  Denies: abdominal pain


Genitourinary:  Denies: burning





Objective





Last 24 Hour Vital Signs








  Date Time  Temp Pulse Resp B/P (MAP) Pulse Ox O2 Delivery O2 Flow Rate FiO2


 


8/5/18 11:14  68 18  98 Room Air  21 8/5/18 11:05  80 18  99 Room Air  21 8/5/18 09:14      Room Air  


 


8/5/18 08:00  75      


 


8/5/18 08:00 97.3 88 18 144/71 (95) 99   





 97.3       


 


8/5/18 07:56  67 18  98 Room Air  21 8/5/18 07:49  74 18  99 Room Air  21 8/5/18 04:00 97.7 98 18 128/53 (78) 98   





 97.7       


 


8/5/18 04:00  87      


 


8/5/18 03:08  64 18  100 Room Air  21 8/5/18 03:01  71 18  98 Room Air  21 8/5/18 00:00 97.0 91 20 132/79 (96) 98   





 97.0       


 


8/5/18 00:00  91      


 


8/4/18 23:06  59 18  100 Room Air  21 8/4/18 22:56  64 18  98 Room Air  21 8/4/18 21:00      Room Air  


 


8/4/18 20:02  61 18  100 Room Air  21 8/4/18 20:00  77      


 


8/4/18 20:00 95.7 71 20 155/75 (101) 99   





 95.7       


 


8/4/18 19:46  55 18  97 Room Air  21 8/4/18 16:00  77      


 


8/4/18 16:00 96.1 87 19 131/78 (95) 97   





 96.1       


 


8/4/18 15:21  56 18  100 Room Air  21


 


8/4/18 15:09  58 18  98 Room Air  21








General Appearance:  no apparent distress, alert


Cardiovascular:  irregularly irregular


Respiratory/Chest:  lungs clear


Abdomen:  normal bowel sounds, non tender, soft


Extremities:  no swelling











Intake and Output  


 


 8/4/18 8/5/18





 19:00 07:00


 


Intake Total 580 ml 


 


Balance 580 ml 


 


  


 


Intake Oral 580 ml 


 


# Voids 3 4


 


# Bowel Movements  1











Laboratory Tests








Test


  8/5/18


06:00


 


White Blood Count


  10.9 K/UL


(4.8-10.8)  #H


 


Red Blood Count


  4.69 M/UL


(4.70-6.10)  L


 


Hemoglobin


  13.2 G/DL


(14.2-18.0)  L


 


Hematocrit


  38.8 %


(42.0-52.0)  L


 


Mean Corpuscular Volume 83 FL (80-99)  


 


Mean Corpuscular Hemoglobin


  28.2 PG


(27.0-31.0)


 


Mean Corpuscular Hemoglobin


Concent 34.1 G/DL


(32.0-36.0)


 


Red Cell Distribution Width


  11.8 %


(11.6-14.8)


 


Platelet Count


  119 K/UL


(150-450)  L


 


Mean Platelet Volume


  10.0 FL


(6.5-10.1)


 


Neutrophils (%) (Auto)


  % (45.0-75.0)


 


 


Lymphocytes (%) (Auto)


  % (20.0-45.0)


 


 


Monocytes (%) (Auto)  % (1.0-10.0)  


 


Eosinophils (%) (Auto)  % (0.0-3.0)  


 


Basophils (%) (Auto)  % (0.0-2.0)  


 


Differential Total Cells


Counted 100  


 


 


Neutrophils % (Manual) 91 % (45-75)  H


 


Lymphocytes % (Manual) 6 % (20-45)  L


 


Monocytes % (Manual) 3 % (1-10)  


 


Eosinophils % (Manual) 0 % (0-3)  


 


Basophils % (Manual) 0 % (0-2)  


 


Band Neutrophils 0 % (0-8)  


 


Platelet Estimate Decreased  L


 


Platelet Morphology Normal  


 


Red Blood Cell Morphology Normal  


 


Sodium Level


  137 MMOL/L


(136-145)


 


Potassium Level


  3.6 MMOL/L


(3.5-5.1)


 


Chloride Level


  97 MMOL/L


()  L


 


Carbon Dioxide Level


  34 MMOL/L


(21-32)  H


 


Anion Gap


  6 mmol/L


(5-15)


 


Blood Urea Nitrogen


  39 mg/dL


(7-18)  H


 


Creatinine


  1.3 MG/DL


(0.55-1.30)


 


Estimat Glomerular Filtration


Rate 55.2 mL/min


(>60)


 


Glucose Level


  242 MG/DL


()  H


 


Calcium Level


  9.4 MG/DL


(8.5-10.1)


 


Total Bilirubin


  1.3 MG/DL


(0.2-1.0)  H


 


Direct Bilirubin


  0.9 MG/DL


(0.0-0.3)  H


 


Aspartate Amino Transf


(AST/SGOT) 43 U/L (15-37)


H


 


Alanine Aminotransferase


(ALT/SGPT) 95 U/L (12-78)


H


 


Alkaline Phosphatase


  161 U/L


()  H


 


Total Protein


  7.2 G/DL


(6.4-8.2)


 


Albumin


  3.3 G/DL


(3.4-5.0)  L


 


Globulin 3.9 g/dL  


 


Albumin/Globulin Ratio


  0.8 (1.0-2.7)


L

















Zach Fonseca MD Aug 5, 2018 13:30

## 2018-08-05 NOTE — CARDIOLOGY REPORT
--------------- APPROVED REPORT --------------





EKG Measurement

Heart 

[id number]

[id number]

[id number]

[id number]





Atrial fibrillation with slow ventricular response

Right bundle branch block

Abnormal ECG

## 2018-08-06 NOTE — CARDIOLOGY PROGRESS NOTE
Assessment/Plan


Assessment/Plan


perm afib 


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 


hs of cm with subsequent resolution 


copd 


abn lft new 


thrombocytopna 


new 


cad s/p pci 


chronic chf 


MR 








chf sx seem to be mild 


he has noted to have resolution of his cm in 6/2018at [facility] confirmed on echo 

here to day 





off bb but now heart rate mildy increasedsd 


tele reviewed 





MR not confirmed on echo today 





swtich to low dose po laix 





he dislike his persent facility which is one of the main reasons he came to er


coreg stat 6.25 bid to see if able to control heart rate  mid level





Subjective


Cardiovascular:  Denies: chest pain, lightheadedness, palpitations


Respiratory:  Denies: shortness of breath


Gastrointestinal/Abdominal:  Denies: abdominal pain


Genitourinary:  Denies: burning





Objective





Last 24 Hour Vital Signs








  Date Time  Temp Pulse Resp B/P (MAP) Pulse Ox O2 Delivery O2 Flow Rate FiO2


 


8/6/18 20:32  78  120/80    


 


8/6/18 20:00  76      


 


8/6/18 18:40  88 16  99 Room Air  21 8/6/18 18:30  83 16  98 Room Air  21 8/6/18 16:00 97.0 89 20 129/85 (100) 100   





 97.0       


 


8/6/18 16:00  86      


 


8/6/18 15:17  86 16  99 Room Air  21 8/6/18 15:03  72 16  98 Room Air  21 8/6/18 12:00 97.3 63 20 143/56 (85) 100   





 97.3       


 


8/6/18 12:00  64      


 


8/6/18 11:20  89 16  98 Room Air  21 8/6/18 11:15  87 16  99 Room Air  21


 


8/6/18 11:05  89 16  98 Room Air  21 8/6/18 09:00      Room Air  


 


8/6/18 09:00  62      





  65      





  53      


 


8/6/18 08:32  68 18  100 Room Air  21 8/6/18 08:21  69 16  98 Room Air  21


 


8/6/18 08:00 96.3 63 20 148/68 (94) 99   





 96.3       


 


8/6/18 08:00  45      


 


8/6/18 04:00  65      


 


8/6/18 04:00 97.0 68 20 152/88 (109) 100   





 97.0       


 


8/6/18 03:37  64 18  99 Room Air  21


 


8/6/18 03:28  60 18  98 Room Air  21


 


8/6/18 00:00 97.5 68 20 123/56 (78) 98   





 97.5       


 


8/6/18 00:00  78      


 


8/5/18 23:45  68 18  99 Room Air  21


 


8/5/18 23:37  60 18  99 Room Air  21


 


8/5/18 21:00      Room Air  








General Appearance:  alert


Neck:  supple


Cardiovascular:  irregularly irregular


Respiratory/Chest:  decreased breath sounds


Abdomen:  normal bowel sounds, non tender, soft


Extremities:  no swelling











Intake and Output  


 


 8/5/18 8/6/18





 19:00 07:00


 


Intake Total 300 ml 240 ml


 


Balance 300 ml 240 ml


 


  


 


Intake Oral 300 ml 240 ml


 


# Voids  2











Laboratory Tests








Test


  8/6/18


06:40 8/6/18


19:18 8/6/18


19:35


 


White Blood Count


  7.5 K/UL


(4.8-10.8) 


  


 


 


Red Blood Count


  3.91 M/UL


(4.70-6.10)  L 


  


 


 


Hemoglobin


  11.1 G/DL


(14.2-18.0)  L 


  


 


 


Hematocrit


  32.3 %


(42.0-52.0)  L 


  


 


 


Mean Corpuscular Volume 83 FL (80-99)    


 


Mean Corpuscular Hemoglobin


  28.3 PG


(27.0-31.0) 


  


 


 


Mean Corpuscular Hemoglobin


Concent 34.2 G/DL


(32.0-36.0) 


  


 


 


Red Cell Distribution Width


  11.9 %


(11.6-14.8) 


  


 


 


Platelet Count


  86 K/UL


(150-450)  L 


  


 


 


Mean Platelet Volume


  9.9 FL


(6.5-10.1) 


  


 


 


Neutrophils (%) (Auto)


  % (45.0-75.0)


  


  


 


 


Lymphocytes (%) (Auto)


  % (20.0-45.0)


  


  


 


 


Monocytes (%) (Auto)  % (1.0-10.0)    


 


Eosinophils (%) (Auto)  % (0.0-3.0)    


 


Basophils (%) (Auto)  % (0.0-2.0)    


 


Differential Total Cells


Counted 100  


  


  


 


 


Neutrophils % (Manual) 84 % (45-75)  H  


 


Lymphocytes % (Manual) 12 % (20-45)  L  


 


Monocytes % (Manual) 4 % (1-10)    


 


Eosinophils % (Manual) 0 % (0-3)    


 


Basophils % (Manual) 0 % (0-2)    


 


Band Neutrophils 0 % (0-8)    


 


Platelet Estimate Decreased  L  


 


Platelet Morphology Normal    


 


Hypochromasia 1+    


 


Anisocytosis 1+    


 


Sodium Level


  139 MMOL/L


(136-145) 


  


 


 


Potassium Level


  3.5 MMOL/L


(3.5-5.1) 


  


 


 


Chloride Level


  102 MMOL/L


() 


  


 


 


Carbon Dioxide Level


  33 MMOL/L


(21-32)  H 


  


 


 


Anion Gap


  4 mmol/L


(5-15)  L 


  


 


 


Blood Urea Nitrogen


  33 mg/dL


(7-18)  H 


  


 


 


Creatinine


  1.0 MG/DL


(0.55-1.30) 


  


 


 


Estimat Glomerular Filtration


Rate > 60 mL/min


(>60) 


  


 


 


Glucose Level


  176 MG/DL


()  H 


  


 


 


Calcium Level


  8.8 MG/DL


(8.5-10.1) 


  


 


 


Total Bilirubin


  0.8 MG/DL


(0.2-1.0) 


  


 


 


Aspartate Amino Transf


(AST/SGOT) 32 U/L (15-37)


  


  


 


 


Alanine Aminotransferase


(ALT/SGPT) 76 U/L (12-78)


  


  


 


 


Alkaline Phosphatase


  124 U/L


()  H 


  


 


 


Total Protein


  5.9 G/DL


(6.4-8.2)  L 


  


 


 


Albumin


  2.7 G/DL


(3.4-5.0)  L 


  


 


 


Globulin 3.2 g/dL    


 


Albumin/Globulin Ratio


  0.8 (1.0-2.7)


L 


  


 


 


Reticulocyte Count  Pending   


 


Fibrinogen


  


  


  288 mg/dL


(200-400)


 


Iron Level


  


  


  58 ug/dL


()


 


Total Iron Binding Capacity


  


  


  206 ug/dL


(250-450)  L


 


Percent Iron Saturation   28 % (15-50)  


 


Unsaturated Iron Binding


  


  


  148 ug/dL


(112-346)


 


Ferritin


  


  


  316 NG/ML


(8-388)


 


Folate


  


  


  18.6 NG/ML


(8.6-58.9)


 


Thyroid Stimulating Hormone


(TSH) 


  


  0.546 uiU/mL


(0.358-3.740)


 


Hepatitis A IgM Antibody   Pending  


 


Hepatitis B Surface Antigen   Pending  


 


Hepatitis B Core IgM Antibody   Pending  


 


Hepatitis C Antibody   Pending  


 


HIV (1&2) Antibody Rapid


  


  


  Negative


(NEGATIVE)

















Zach Fonseca MD Aug 6, 2018 20:52

## 2018-08-06 NOTE — PULMONOLOGY PROGRESS NOTE
Assessment/Plan


Assessment/Plan


HPI


HPI


Patient is a 66-year-old male who presented after increased difficulty 

breathing and generalized weakness.  Patient gradual onset of symptoms.  He 

reports having prior history of COPD.  Patient reports recently stopping 

smoking.  But he states he been smoking up until a few days ago.  He reports 

having increased cough.  No new complaints.  He states he has some unknown type 

of cardiac arrhythmia.  He's had previous cardiac catheterization.


Allergies:  


Coded Allergies:  


     AMPICILLIN (Verified  Allergy, Unknown, 8/3/18)


Uncoded Allergies:  


     PENICILLIN (Allergy, Unknown, 8/3/18)





Patient History


Past Medical History:  COPD


Reviewed Nursing Documentation:  PMH: Agreed; PSxH: Agreed





Nursing Documentation-PMH


Past Medical History:  No History, Except For


Hx Cardiac Problems:  Yes - arrhythmia


Hx Hypertension:  No


Hx Pacemaker:  No


Hx Asthma:  Yes


Hx COPD:  Yes


Hx Diabetes:  No


Hx Cancer:  No


Hx Gastrointestinal Problems:  No


Hx Dialysis:  No


History Of Psychiatric Problem:  No


Hx Neurological Problems:  No


Hx Cerebrovascular Accident:  No


Hx Seizures:  No





Review of Systems


All Other Systems:  negative except mentioned in HPI





Physical Exam





Vital Signs








  Date Time  Temp Pulse Resp B/P (MAP) Pulse Ox O2 Delivery O2 Flow Rate FiO2


 


8/3/18 13:53 96.0 127 22 133/66 86 Room Air  





 96.1       








Sp02 EP Interpretation:  reviewed, normal


General Appearance:  normal inspection, alert, GCS 15, moderate distress, thin, 

Chronically Ill


Head:  atraumatic


ENT:  normal ENT inspection, hearing grossly normal, normal voice


Neck:  normal inspection, supple, no bony tend, limited range of motion


Respiratory:  respiratory distress, accessory muscle use, wheezing


Cardiovascular #1:  no edema, irregularly irregular


Gastrointestinal:  normal inspection, normal bowel sounds, non tender, soft, no 

guarding, no hernia


Genitourinary:  no CVA tenderness


Musculoskeletal:  normal inspection, back normal, normal range of motion


Neurologic:  normal inspection, alert, oriented x3, responsive, speech normal


Psychiatric:  normal inspection, judgement/insight normal, mood/affect normal


Skin:  normal inspection, normal color, no rash





Medical Decision Making


ER Course


Patient is a for generalized weakness and shortness of breath.  Differential 

included but was not limited to anemia, pneumonia, pneumothorax, myocardial 

infarction, pericardial effusion, congestive heart failure, acidosis.  Because 

of complexity of patient's case laboratory testing and imaging studies were 

ordered.





Last Vital Signs








  Date Time  Temp Pulse Resp B/P (MAP) Pulse Ox O2 Delivery O2 Flow Rate FiO2


 


8/3/18 13:53 96.0 127 22 133/66 86 Room Air  





 96.1       








Assessment/Plan








#Acute decompensated CHF


#COPD exacerbation 


#Possible Viral URI


#Transaminitis (? hepatic congestion)


#HTN


#HLD





- diuresis PRN


-Wean steroids


- monitor labs


- PTA meds


- nebs q4h atc


-Continue current steroids


- supplemental 02 sats 90-94%


- supportive care


- dvt ppx: SQ Heparin 5000 BID


- full code








I spent 45 min on this case, 23 min was dedicated to counseling and care 

coordination





Subjective


ROS Limited/Unobtainable:  No


Allergies:  


Coded Allergies:  


     AMPICILLIN (Verified  Allergy, Unknown, 8/3/18)


Uncoded Allergies:  


     PENICILLIN (Allergy, Unknown, 8/3/18)





Objective





Last 24 Hour Vital Signs








  Date Time  Temp Pulse Resp B/P (MAP) Pulse Ox O2 Delivery O2 Flow Rate FiO2


 


8/6/18 15:03  72 16  98 Room Air  21


 


8/6/18 12:00 97.3 63 20 143/56 (85) 100   





 97.3       


 


8/6/18 12:00  64      


 


8/6/18 11:20  89 16  98 Room Air  21


 


8/6/18 11:15  87 16  99 Room Air  21


 


8/6/18 11:05  89 16  98 Room Air  21


 


8/6/18 09:00      Room Air  


 


8/6/18 09:00  62      





  65      





  53      


 


8/6/18 08:32  68 18  100 Room Air  21


 


8/6/18 08:21  69 16  98 Room Air  21


 


8/6/18 08:00 96.3 63 20 148/68 (94) 99   





 96.3       


 


8/6/18 08:00  45      


 


8/6/18 04:00  65      


 


8/6/18 04:00 97.0 68 20 152/88 (109) 100   





 97.0       


 


8/6/18 03:37  64 18  99 Room Air  21 8/6/18 03:28  60 18  98 Room Air  21 8/6/18 00:00 97.5 68 20 123/56 (78) 98   





 97.5       


 


8/6/18 00:00  78      


 


8/5/18 23:45  68 18  99 Room Air  21 8/5/18 23:37  60 18  99 Room Air  21


 


8/5/18 21:00      Room Air  


 


8/5/18 20:00  80      


 


8/5/18 20:00 97.0 71 20 142/77 (98) 98   





 97.0       


 


8/5/18 19:42  69 18  99 Room Air  21


 


8/5/18 19:33  61 18  98 Room Air  21


 


8/5/18 16:00 97.3 78 22 152/76 (101) 94   





 97.3       


 


8/5/18 16:00  71      

















Intake and Output  


 


 8/5/18 8/6/18





 19:00 07:00


 


Intake Total 300 ml 240 ml


 


Balance 300 ml 240 ml


 


  


 


Intake Oral 300 ml 240 ml


 


# Voids  2








Laboratory Tests


8/6/18 06:40: 


White Blood Count 7.5, Red Blood Count 3.91L, Hemoglobin 11.1L, Hematocrit 32.3L

, Mean Corpuscular Volume 83, Mean Corpuscular Hemoglobin 28.3, Mean 

Corpuscular Hemoglobin Concent 34.2, Red Cell Distribution Width 11.9, Platelet 

Count 86L, Mean Platelet Volume 9.9, Neutrophils (%) (Auto) , Lymphocytes (%) (

Auto) , Monocytes (%) (Auto) , Eosinophils (%) (Auto) , Basophils (%) (Auto) , 

Differential Total Cells Counted 100, Neutrophils % (Manual) 84H, Lymphocytes % 

(Manual) 12L, Monocytes % (Manual) 4, Eosinophils % (Manual) 0, Basophils % (

Manual) 0, Band Neutrophils 0, Platelet Estimate DecreasedL, Platelet 

Morphology Normal, Hypochromasia 1+, Anisocytosis 1+, Sodium Level 139, 

Potassium Level 3.5, Chloride Level 102, Carbon Dioxide Level 33H, Anion Gap 4L

, Blood Urea Nitrogen 33H, Creatinine 1.0, Estimat Glomerular Filtration Rate > 

60, Glucose Level 176H, Calcium Level 8.8, Total Bilirubin 0.8, Aspartate Amino 

Transf (AST/SGOT) 32, Alanine Aminotransferase (ALT/SGPT) 76, Alkaline 

Phosphatase 124H, Total Protein 5.9L, Albumin 2.7L, Globulin 3.2, Albumin/

Globulin Ratio 0.8L





Current Medications








 Medications


  (Trade)  Dose


 Ordered  Sig/Marissa


 Route


 PRN Reason  Start Time


 Stop Time Status Last Admin


Dose Admin


 


 Acetaminophen


  (Tylenol)  650 mg  Q4H  PRN


 ORAL


 Mild Pain (Pain Scale 1-3)  8/3/18 16:00


 9/2/18 15:59  8/3/18 17:57


 


 


 Acetaminophen


  (Tylenol)  650 mg  Q4H  PRN


 ORAL


 fever  8/3/18 16:00


 9/2/18 15:59   


 


 


 Albuterol/


 Ipratropium


  (Albuterol/


 Ipratropium)  3 ml  Q4HRT


 HHN


   8/3/18 16:15


 8/8/18 16:14  8/6/18 15:03


 


 


 Apixaban


  (Eliquis)  5 mg  BID


 ORAL


   8/4/18 18:00


 9/3/18 17:59  8/6/18 09:08


 


 


 Aspirin


  (ASA)  81 mg  DAILY


 ORAL


   8/6/18 09:00


 9/3/18 08:59  8/6/18 09:08


 


 


 Atorvastatin


 Calcium


  (Lipitor)  80 mg  BEDTIME


 ORAL


   8/3/18 21:00


 9/2/18 20:59  8/5/18 20:42


 


 


 Clopidogrel


 Bisulfate


  (Plavix)  75 mg  DAILY


 ORAL


   8/6/18 09:00


 9/4/18 08:59  8/6/18 09:08


 


 


 Dextrose


  (Dextrose 50%)  25 ml  STAT  PRN


 IV


 Hypoglycemia  8/3/18 16:00


 9/2/18 15:59   


 


 


 Dextrose


  (Dextrose 50%)  50 ml  STAT  PRN


 IV


 Hypoglycemia  8/3/18 16:00


 9/2/18 15:59   


 


 


 Docusate Sodium


  (Colace)  100 mg  EVERY 12  HOURS


 ORAL


   8/3/18 21:00


 9/2/18 20:59  8/6/18 09:09


 


 


 Doxycycline


 Monohydrate


  (Vibramycin)  100 mg  EVERY 12  HOURS


 ORAL


   8/3/18 21:00


 8/10/18 20:59  8/6/18 09:08


 


 


 Famotidine


  (Pepcid)  20 mg  BID


 ORAL


   8/3/18 18:00


 9/2/18 17:59  8/6/18 09:08


 


 


 Morphine Sulfate


  (Morphine


 Sulfate)  2 mg  Q4H  PRN


 IVP


 Moderate Pain (Pain Scale 4-6)  8/3/18 16:00


 8/10/18 15:59   


 


 


 Morphine Sulfate


  (Morphine


 Sulfate)  4 mg  Q4H  PRN


 IVP


 Severe Pain (Pain Scale 7-10)  8/3/18 16:00


 8/10/18 15:59   


 


 


 Ondansetron HCl


  (Zofran)  4 mg  Q6H  PRN


 IVP


 Nausea & Vomiting  8/3/18 16:00


 9/2/18 15:59   


 


 


 Polyethylene


 Glycol


  (Miralax)  17 gm  Q12H  PRN


 ORAL


 Constipation  8/5/18 20:30


 9/4/18 20:29  8/5/18 20:42


 


 


 Prednisone


  (predniSONE)  40 mg  DAILY


 ORAL


   8/6/18 09:00


 9/5/18 08:59  8/6/18 09:08


 


 


 Zolpidem Tartrate


  (Ambien)  5 mg  HSPRN  PRN


 ORAL


 Insomnia  8/5/18 02:00


 8/12/18 01:59  8/6/18 00:14


 

















Jonathan Allred MD Aug 6, 2018 15:12

## 2018-08-06 NOTE — CONSULTATION
History of Present Illness


General


Chief Complaint:  Generalized Weakness





Present Illness


HPI


66 year old male with multiple medical comorbidities including COPD and CHF, 

who presented to the ED with complaints of worsening generalized weakness and 

one week of increased shortness of breath. Pt has been living in a board and 

care facility and reports limited access to healthy food. recently has had 

little po intake due to anorexia and general malaise. At baseline he is able to 

walk for > 1 block on flat ground, but now is limited to 20-30 feet. He also 

reports Orthopnea. As he was declining he came to ED for evaluation.  Upon 

admission and work up noted to have elevated LFT's, T bili elevation with 

elevated Direct bilirubin >2:1 ratio with possible biliary obstruction.  

Surgery called to evaluate for possible cholecystitis, choledocholithiasis. 

Patient seen, chart reviewed, patient examined.  On exam patient states he has 

RUQ tenderness but believes it is related to his constipation.  states he has 

been having pain in RUQ for a few days.  mild nausea intermittent.  no emesis 

currently.


Allergies:  


Coded Allergies:  


     AMPICILLIN (Verified  Allergy, Unknown, 8/3/18)


Uncoded Allergies:  


     PENICILLIN (Allergy, Unknown, 8/3/18)





Medication History


Scheduled


Carvedilol (Coreg), 12.5 MG ORAL EVERY 12 HOURS, (Reported)


Furosemide (Furosemide), 40 MG PO DAILY, (Reported)


Rosuvastatin Calcium* (Crestor*), 5 MG ORAL DAILY, (Reported)





Patient History


History Provided By:  Patient, Medical Record, PMD


Healthcare decision maker


PATIENT


Resuscitation status





Advanced Directive on File








Past Medical/Surgical History


Past Medical/Surgical History:  


(1) Cholecystitis


(2) Choledocholithiasis


(3) COPD (chronic obstructive pulmonary disease)


(4) Atrial fibrillation


(5) CHF (congestive heart failure)


(6) Episode of generalized weakness





Review of Systems


Constitutional:  Reports: malaise, weakness


Eye:  Denies: no symptoms, see HPI, eye pain, blurred vision, tearing, double 

vision, nose pain, nose congestion, acuity changes, discharge, other


ENT:  Denies: no symptoms, see HPI, ear pain, ear discharge, nose pain, nose 

congestion, throat pain, throat swelling, mouth pain, hearing loss, nasal 

discharge, other


Respiratory:  Reports: orthopnea, shortness of breath


Cardiovascular:  Reports: edema


Gastrointestinal:  Reports: abdominal pain, constipation, nausea


Genitourinary:  Denies: no symptoms, see HPI, discharge, dysuria, frequency, 

hematuria, pain, retention, incontinence, urgency, vag bleed/dc, other


Musculoskeletal:  Reports: back pain


Skin:  Denies: no symptoms, see HPI, rash, change in color, change in hair/nails

, dryness, lesions, other


Psychiatric:  Denies: no symptoms, see HPI, prior hx, anxiety, depressed 

feelings, emotional problems, SI, HI, hallucinations, other


Neurological:  Reports: headache


Endocrine:  Denies: no symptoms, see HPI, excessive sweating, flushing, 

intolerance to temperature, increased thirst, increased urine, unexplained 

weight loss, other


Hematologic/Lymphatic:  Denies: no symptoms, see HPI, anemia, blood clots, easy 

bleeding, easy bruising, swollen glands, diathesis, other





Physical Exam


General Appearance:  no apparent distress, alert


Lines, tubes and drains:  peripheral


HEENT:  normocephalic, atraumatic, mucous membranes moist


Neck:  normal alignment


Respiratory/Chest:  normal breath sounds, no respiratory distress, no accessory 

muscle use


Cardiovascular/Chest:  normal rate, regular rhythm


Abdomen:  soft, no organomegaly, no mass, guarding, rebound, tender - RUQ, 

other - +[place]


Extremities:  non-tender, normal inspection, trace edema


Skin Exam:  warm/dry


Neurologic:  alert, responsive





Last 24 Hour Vital Signs








  Date Time  Temp Pulse Resp B/P (MAP) Pulse Ox O2 Delivery O2 Flow Rate FiO2


 


8/6/18 16:00 97.0 89 20 129/85 (100) 100   





 97.0       


 


8/6/18 16:00  86      


 


8/6/18 15:17  86 16  99 Room Air  21 8/6/18 15:03  72 16  98 Room Air  21 8/6/18 12:00 97.3 63 20 143/56 (85) 100   





 97.3       


 


8/6/18 12:00  64      


 


8/6/18 11:20  89 16  98 Room Air  21 8/6/18 11:15  87 16  99 Room Air  21 8/6/18 11:05  89 16  98 Room Air  21 8/6/18 09:00      Room Air  


 


8/6/18 09:00  62      





  65      





  53      


 


8/6/18 08:32  68 18  100 Room Air  21 8/6/18 08:21  69 16  98 Room Air  21 8/6/18 08:00 96.3 63 20 148/68 (94) 99   





 96.3       


 


8/6/18 08:00  45      


 


8/6/18 04:00  65      


 


8/6/18 04:00 97.0 68 20 152/88 (109) 100   





 97.0       


 


8/6/18 03:37  64 18  99 Room Air  21


 


8/6/18 03:28  60 18  98 Room Air  21


 


8/6/18 00:00 97.5 68 20 123/56 (78) 98   





 97.5       


 


8/6/18 00:00  78      


 


8/5/18 23:45  68 18  99 Room Air  21


 


8/5/18 23:37  60 18  99 Room Air  21


 


8/5/18 21:00      Room Air  

















Intake and Output  


 


 8/5/18 8/6/18





 19:00 07:00


 


Intake Total 300 ml 240 ml


 


Balance 300 ml 240 ml


 


  


 


Intake Oral 300 ml 240 ml


 


# Voids  2











Laboratory Tests








Test


  8/6/18


06:40 8/6/18


19:18 8/6/18


19:35


 


White Blood Count


  7.5 K/UL


(4.8-10.8) 


  


 


 


Red Blood Count


  3.91 M/UL


(4.70-6.10)  L 


  


 


 


Hemoglobin


  11.1 G/DL


(14.2-18.0)  L 


  


 


 


Hematocrit


  32.3 %


(42.0-52.0)  L 


  


 


 


Mean Corpuscular Volume 83 FL (80-99)    


 


Mean Corpuscular Hemoglobin


  28.3 PG


(27.0-31.0) 


  


 


 


Mean Corpuscular Hemoglobin


Concent 34.2 G/DL


(32.0-36.0) 


  


 


 


Red Cell Distribution Width


  11.9 %


(11.6-14.8) 


  


 


 


Platelet Count


  86 K/UL


(150-450)  L 


  


 


 


Mean Platelet Volume


  9.9 FL


(6.5-10.1) 


  


 


 


Neutrophils (%) (Auto)


  % (45.0-75.0)


  


  


 


 


Lymphocytes (%) (Auto)


  % (20.0-45.0)


  


  


 


 


Monocytes (%) (Auto)  % (1.0-10.0)    


 


Eosinophils (%) (Auto)  % (0.0-3.0)    


 


Basophils (%) (Auto)  % (0.0-2.0)    


 


Differential Total Cells


Counted 100  


  


  


 


 


Neutrophils % (Manual) 84 % (45-75)  H  


 


Lymphocytes % (Manual) 12 % (20-45)  L  


 


Monocytes % (Manual) 4 % (1-10)    


 


Eosinophils % (Manual) 0 % (0-3)    


 


Basophils % (Manual) 0 % (0-2)    


 


Band Neutrophils 0 % (0-8)    


 


Platelet Estimate Decreased  L  


 


Platelet Morphology Normal    


 


Hypochromasia 1+    


 


Anisocytosis 1+    


 


Sodium Level


  139 MMOL/L


(136-145) 


  


 


 


Potassium Level


  3.5 MMOL/L


(3.5-5.1) 


  


 


 


Chloride Level


  102 MMOL/L


() 


  


 


 


Carbon Dioxide Level


  33 MMOL/L


(21-32)  H 


  


 


 


Anion Gap


  4 mmol/L


(5-15)  L 


  


 


 


Blood Urea Nitrogen


  33 mg/dL


(7-18)  H 


  


 


 


Creatinine


  1.0 MG/DL


(0.55-1.30) 


  


 


 


Estimat Glomerular Filtration


Rate > 60 mL/min


(>60) 


  


 


 


Glucose Level


  176 MG/DL


()  H 


  


 


 


Calcium Level


  8.8 MG/DL


(8.5-10.1) 


  


 


 


Total Bilirubin


  0.8 MG/DL


(0.2-1.0) 


  


 


 


Aspartate Amino Transf


(AST/SGOT) 32 U/L (15-37)


  


  


 


 


Alanine Aminotransferase


(ALT/SGPT) 76 U/L (12-78)


  


  


 


 


Alkaline Phosphatase


  124 U/L


()  H 


  


 


 


Total Protein


  5.9 G/DL


(6.4-8.2)  L 


  


 


 


Albumin


  2.7 G/DL


(3.4-5.0)  L 


  


 


 


Globulin 3.2 g/dL    


 


Albumin/Globulin Ratio


  0.8 (1.0-2.7)


L 


  


 


 


Reticulocyte Count  Pending   


 


Fibrinogen   Pending  


 


Iron Level   Pending  


 


Unsaturated Iron Binding   Pending  


 


Ferritin   Pending  


 


Folate   Pending  


 


Thyroid Stimulating Hormone


(TSH) 


  


  Pending  


 


 


Hepatitis A IgM Antibody   Pending  


 


Hepatitis B Surface Antigen   Pending  


 


Hepatitis B Core IgM Antibody   Pending  


 


Hepatitis C Antibody   Pending  


 


HIV (1&2) Antibody Rapid   Pending  








Height (Feet):  5


Height (Inches):  9.00


Weight (Pounds):  160


Medications





Current Medications








 Medications


  (Trade)  Dose


 Ordered  Sig/Marissa


 Route


 PRN Reason  Start Time


 Stop Time Status Last Admin


Dose Admin


 


 Acetaminophen


  (Tylenol)  650 mg  Q4H  PRN


 ORAL


 Mild Pain (Pain Scale 1-3)  8/3/18 16:00


 9/2/18 15:59  8/3/18 17:57


 


 


 Acetaminophen


  (Tylenol)  650 mg  Q4H  PRN


 ORAL


 fever  8/3/18 16:00


 9/2/18 15:59   


 


 


 Albuterol/


 Ipratropium


  (Albuterol/


 Ipratropium)  3 ml  Q4HRT


 HHN


   8/3/18 16:15


 8/8/18 16:14  8/6/18 15:03


 


 


 Apixaban


  (Eliquis)  5 mg  BID


 ORAL


   8/4/18 18:00


 9/3/18 17:59  8/6/18 17:35


 


 


 Aspirin


  (ASA)  81 mg  DAILY


 ORAL


   8/6/18 09:00


 9/3/18 08:59  8/6/18 09:08


 


 


 Atorvastatin


 Calcium


  (Lipitor)  80 mg  BEDTIME


 ORAL


   8/3/18 21:00


 9/2/18 20:59  8/5/18 20:42


 


 


 Carvedilol


  (Coreg)  6.25 mg  EVERY 12  HOURS


 ORAL


   8/6/18 21:00


 9/5/18 20:59   


 


 


 Clopidogrel


 Bisulfate


  (Plavix)  75 mg  DAILY


 ORAL


   8/6/18 09:00


 9/4/18 08:59  8/6/18 09:08


 


 


 Dextrose


  (Dextrose 50%)  25 ml  STAT  PRN


 IV


 Hypoglycemia  8/3/18 16:00


 9/2/18 15:59   


 


 


 Dextrose


  (Dextrose 50%)  50 ml  STAT  PRN


 IV


 Hypoglycemia  8/3/18 16:00


 9/2/18 15:59   


 


 


 Docusate Sodium


  (Colace)  100 mg  EVERY 12  HOURS


 ORAL


   8/3/18 21:00


 9/2/18 20:59  8/6/18 09:09


 


 


 Doxycycline


 Monohydrate


  (Vibramycin)  100 mg  EVERY 12  HOURS


 ORAL


   8/3/18 21:00


 8/10/18 20:59  8/6/18 09:08


 


 


 Famotidine


  (Pepcid)  20 mg  BID


 ORAL


   8/3/18 18:00


 9/2/18 17:59  8/6/18 17:35


 


 


 Morphine Sulfate


  (Morphine


 Sulfate)  2 mg  Q4H  PRN


 IVP


 Moderate Pain (Pain Scale 4-6)  8/3/18 16:00


 8/10/18 15:59   


 


 


 Morphine Sulfate


  (Morphine


 Sulfate)  4 mg  Q4H  PRN


 IVP


 Severe Pain (Pain Scale 7-10)  8/3/18 16:00


 8/10/18 15:59   


 


 


 Ondansetron HCl


  (Zofran)  4 mg  Q6H  PRN


 IVP


 Nausea & Vomiting  8/3/18 16:00


 9/2/18 15:59   


 


 


 Polyethylene


 Glycol


  (Miralax)  17 gm  Q12H  PRN


 ORAL


 Constipation  8/5/18 20:30


 9/4/18 20:29  8/5/18 20:42


 


 


 Prednisone


  (predniSONE)  30 mg  DAILY


 ORAL


   8/7/18 09:00


 9/6/18 08:59   


 


 


 Zolpidem Tartrate


  (Ambien)  5 mg  HSPRN  PRN


 ORAL


 Insomnia  8/5/18 02:00


 8/12/18 01:59  8/6/18 00:14


 











Assessment/Plan


Problem List:  


(1) Choledocholithiasis


Assessment & Plan:  ultrasound with stones and mildly dilated CBD.  LFT's, alk 

phos, bili elevated but trending down now. 





MRCP ordered but patient unable to tolerate MRI machine


will order HIDA scan 


trend labs


will follow with recs.  thank you for this consultation.


ICD Codes:  K80.50 - Calculus of bile duct without cholangitis or cholecystitis 

without obstruction


SNOMED:  937146132


(2) Cholecystitis


ICD Codes:  K81.9 - Cholecystitis, unspecified


SNOMED:  54239211











Timothy Heath Aug 6, 2018 20:21

## 2018-08-06 NOTE — GENERAL PROGRESS NOTE
Assessment/Plan


Status:  progressing


Assessment/Plan


#Acute decompensated CHF - improved on tx


#COPD exacerbation - improved on tx


#Leukocytosis - suspect volume contraction


#Transaminitis - improved, distended CBD on abd us


#Thrombocytopenia - new, no cirrhosis on US


#A fib


#HTN


#HLD





- cardiology consulted, appreciate recs


- telemetry monitoring reviewed by me 


- serial tn neg


- diuresis per cards, now on po lasix


- strict i/o's


- trend chem


- replace lytes as needed


- UA non infectious


- apixaban BID


- plavix, coreg, statin 


- Pulm consult, appreciate recs


- doxy BID


- nebs q4h atc


- steroids per pulm


- supplemental 02


- trend LFTs


- GI consulted


- gen surg consult 


- trend CBC


- supportive care


- dvt ppx: HSQ BID


- full code





anticipate pt will require 2-3 days in house


anticipate dc home w/ HH vs SNF once medically stable





I spent 45 min on this case, 25 min was dedicated to counseling and care 

coordination





time of note may not reflect time of clinical encounter





Subjective


Date patient seen:  Aug 6, 2018


Time patient seen:  12:45


ROS Limited/Unobtainable:  No


Constitutional:  Reports: malaise


HEENT:  Reports: no symptoms


Cardiovascular:  Reports: no symptoms


Respiratory:  Reports: cough, shortness of breath, wheezing


Gastrointestinal/Abdominal:  Reports: no symptoms


Genitourinary:  Reports: no symptoms


Neurologic/Psychiatric:  Reports: no symptoms


Endocrine:  Reports: no symptoms


Hematologic/Lymphatic:  Reports: no symptoms


Allergies:  


Coded Allergies:  


     AMPICILLIN (Verified  Allergy, Unknown, 8/3/18)


Uncoded Allergies:  


     PENICILLIN (Allergy, Unknown, 8/3/18)


Subjective


No acute events overnight


Patient feels improved dyspnea today


No chest pain





Objective





Last 24 Hour Vital Signs








  Date Time  Temp Pulse Resp B/P (MAP) Pulse Ox O2 Delivery O2 Flow Rate FiO2


 


8/6/18 20:32  78  120/80    


 


8/6/18 20:00  76      


 


8/6/18 18:40  88 16  99 Room Air  21


 


8/6/18 18:30  83 16  98 Room Air  21


 


8/6/18 16:00 97.0 89 20 129/85 (100) 100   





 97.0       


 


8/6/18 16:00  86      


 


8/6/18 15:17  86 16  99 Room Air  21


 


8/6/18 15:03  72 16  98 Room Air  21


 


8/6/18 12:00 97.3 63 20 143/56 (85) 100   





 97.3       


 


8/6/18 12:00  64      


 


8/6/18 11:20  89 16  98 Room Air  21


 


8/6/18 11:15  87 16  99 Room Air  21


 


8/6/18 11:05  89 16  98 Room Air  21


 


8/6/18 09:00      Room Air  


 


8/6/18 09:00  62      





  65      





  53      


 


8/6/18 08:32  68 18  100 Room Air  21


 


8/6/18 08:21  69 16  98 Room Air  21


 


8/6/18 08:00 96.3 63 20 148/68 (94) 99   





 96.3       


 


8/6/18 08:00  45      


 


8/6/18 04:00  65      


 


8/6/18 04:00 97.0 68 20 152/88 (109) 100   





 97.0       


 


8/6/18 03:37  64 18  99 Room Air  21 8/6/18 03:28  60 18  98 Room Air  21 8/6/18 00:00 97.5 68 20 123/56 (78) 98   





 97.5       


 


8/6/18 00:00  78      


 


8/5/18 23:45  68 18  99 Room Air  21 8/5/18 23:37  60 18  99 Room Air  21 8/5/18 21:00      Room Air  

















Intake and Output  


 


 8/5/18 8/6/18





 19:00 07:00


 


Intake Total 300 ml 240 ml


 


Balance 300 ml 240 ml


 


  


 


Intake Oral 300 ml 240 ml


 


# Voids  2








Laboratory Tests


8/6/18 06:40: 


White Blood Count 7.5, Red Blood Count 3.91L, Hemoglobin 11.1L, Hematocrit 32.3L

, Mean Corpuscular Volume 83, Mean Corpuscular Hemoglobin 28.3, Mean 

Corpuscular Hemoglobin Concent 34.2, Red Cell Distribution Width 11.9, Platelet 

Count 86L, Mean Platelet Volume 9.9, Neutrophils (%) (Auto) , Lymphocytes (%) (

Auto) , Monocytes (%) (Auto) , Eosinophils (%) (Auto) , Basophils (%) (Auto) , 

Differential Total Cells Counted 100, Neutrophils % (Manual) 84H, Lymphocytes % 

(Manual) 12L, Monocytes % (Manual) 4, Eosinophils % (Manual) 0, Basophils % (

Manual) 0, Band Neutrophils 0, Platelet Estimate DecreasedL, Platelet 

Morphology Normal, Hypochromasia 1+, Anisocytosis 1+, Sodium Level 139, 

Potassium Level 3.5, Chloride Level 102, Carbon Dioxide Level 33H, Anion Gap 4L

, Blood Urea Nitrogen 33H, Creatinine 1.0, Estimat Glomerular Filtration Rate > 

60, Glucose Level 176H, Calcium Level 8.8, Total Bilirubin 0.8, Aspartate Amino 

Transf (AST/SGOT) 32, Alanine Aminotransferase (ALT/SGPT) 76, Alkaline 

Phosphatase 124H, Total Protein 5.9L, Albumin 2.7L, Globulin 3.2, Albumin/

Globulin Ratio 0.8L


8/6/18 19:18: Reticulocyte Count [Pending]


8/6/18 19:35: 


Fibrinogen 288, Iron Level 58, Total Iron Binding Capacity 206L, Percent Iron 

Saturation 28, Unsaturated Iron Binding 148, Ferritin 316, Folate 18.6, Thyroid 

Stimulating Hormone (TSH) 0.546, Hepatitis A IgM Antibody [Pending], Hepatitis 

B Surface Antigen [Pending], Hepatitis B Core IgM Antibody [Pending], Hepatitis 

C Antibody [Pending], HIV (1&2) Antibody Rapid Negative


Height (Feet):  5


Height (Inches):  9.00


Weight (Pounds):  160


General Appearance:  no apparent distress


EENT:  PERRL/EOMI, TMs normal, pharynx normal, pale conjunctivae


Neck:  non-tender, supple


Cardiovascular:  irregularly irregular


Respiratory/Chest:  decreased breath sounds, expiratory wheezing


Abdomen:  normal bowel sounds, soft


Pelvis:  normal external exam


Extremities:  normal range of motion, non-tender


Edema:  no edema noted Arm (L), no edema noted Arm (R), no edema noted Leg (L), 

no edema noted Leg (R)


Neurologic:  CNs II-XII grossly normal


Skin:  normal pigmentation, warm/dry


Lymphatic:  normal anterior cervical (L), normal anterior cervical (R), normal 

posterior cervical (L), normal posterior cervical (R)











Gino Stover M.D. Aug 6, 2018 21:01

## 2018-08-06 NOTE — PULMONOLOGY PROGRESS NOTE
Assessment/Plan


Assessment/Plan


[facility]


HPI


DOS 8/5/2018





Patient is a 66-year-old male who presented after increased difficulty 

breathing and generalized weakness.  Patient gradual onset of symptoms.  He 

reports having prior history of COPD.  Patient reports recently stopping 

smoking.  But he states he been smoking up until a few days ago.  He reports 

having increased cough.  No new complaints.  He states he has some unknown type 

of cardiac arrhythmia.  He's had previous cardiac catheterization.


Allergies:  


Coded Allergies:  


     AMPICILLIN (Verified  Allergy, Unknown, 8/3/18)


Uncoded Allergies:  


     PENICILLIN (Allergy, Unknown, 8/3/18)





Patient History


Past Medical History:  COPD


Reviewed Nursing Documentation:  PMH: Agreed; PSxH: Agreed





Nursing Documentation-PMH


Past Medical History:  No History, Except For


Hx Cardiac Problems:  Yes - arrhythmia


Hx Hypertension:  No


Hx Pacemaker:  No


Hx Asthma:  Yes


Hx COPD:  Yes


Hx Diabetes:  No


Hx Cancer:  No


Hx Gastrointestinal Problems:  No


Hx Dialysis:  No


History Of Psychiatric Problem:  No


Hx Neurological Problems:  No


Hx Cerebrovascular Accident:  No


Hx Seizures:  No





Review of Systems


All Other Systems:  negative except mentioned in HPI





Physical Exam





Vital Signs








  Date Time  Temp Pulse Resp B/P (MAP) Pulse Ox O2 Delivery O2 Flow Rate FiO2


 


8/3/18 13:53 96.0 127 22 133/66 86 Room Air  





 96.1       








Sp02 EP Interpretation:  reviewed, normal


General Appearance:  normal inspection, alert, GCS 15, moderate distress, thin, 

Chronically Ill


Head:  atraumatic


ENT:  normal ENT inspection, hearing grossly normal, normal voice


Neck:  normal inspection, supple, no bony tend, limited range of motion


Respiratory:  respiratory distress, accessory muscle use, wheezing


Cardiovascular #1:  no edema, irregularly irregular


Gastrointestinal:  normal inspection, normal bowel sounds, non tender, soft, no 

guarding, no hernia


Genitourinary:  no CVA tenderness


Musculoskeletal:  normal inspection, back normal, normal range of motion


Neurologic:  normal inspection, alert, oriented x3, responsive, speech normal


Psychiatric:  normal inspection, judgement/insight normal, mood/affect normal


Skin:  normal inspection, normal color, no rash





Medical Decision Making


ER Course


Patient is a for generalized weakness and shortness of breath.  Differential 

included but was not limited to anemia, pneumonia, pneumothorax, myocardial 

infarction, pericardial effusion, congestive heart failure, acidosis.  Because 

of complexity of patient's case laboratory testing and imaging studies were 

ordered.





Last Vital Signs








  Date Time  Temp Pulse Resp B/P (MAP) Pulse Ox O2 Delivery O2 Flow Rate FiO2


 


8/3/18 13:53 96.0 127 22 133/66 86 Room Air  





 96.1       








Assessment/Plan








#Acute decompensated CHF


#COPD exacerbation 


#Possible Viral URI


#Transaminitis (? hepatic congestion)


#HTN


#HLD





- diuresis PRN


-Wean steroids


- monitor labs


- PTA meds


- nebs q4h atc


-Continue current steroids


- supplemental 02 sats 90-94%


- supportive care


- dvt ppx: SQ Heparin 5000 BID


- full code





NOTE DOS 8/5/2018


I spent 45 min on this case, 23 min was dedicated to counseling and care 

coordination





Subjective


Allergies:  


Coded Allergies:  


     AMPICILLIN (Verified  Allergy, Unknown, 8/3/18)


Uncoded Allergies:  


     PENICILLIN (Allergy, Unknown, 8/3/18)





Objective





Last 24 Hour Vital Signs








  Date Time  Temp Pulse Resp B/P (MAP) Pulse Ox O2 Delivery O2 Flow Rate FiO2


 


8/6/18 15:03  72 16  98 Room Air  21 8/6/18 12:00 97.3 63 20 143/56 (85) 100   





 97.3       


 


8/6/18 12:00  64      


 


8/6/18 11:20  89 16  98 Room Air  21


 


8/6/18 11:15  87 16  99 Room Air  21


 


8/6/18 11:05  89 16  98 Room Air  21


 


8/6/18 09:00      Room Air  


 


8/6/18 09:00  62      





  65      





  53      


 


8/6/18 08:32  68 18  100 Room Air  21 8/6/18 08:21  69 16  98 Room Air  21


 


8/6/18 08:00 96.3 63 20 148/68 (94) 99   





 96.3       


 


8/6/18 08:00  45      


 


8/6/18 04:00  65      


 


8/6/18 04:00 97.0 68 20 152/88 (109) 100   





 97.0       


 


8/6/18 03:37  64 18  99 Room Air  21 8/6/18 03:28  60 18  98 Room Air  21 8/6/18 00:00 97.5 68 20 123/56 (78) 98   





 97.5       


 


8/6/18 00:00  78      


 


8/5/18 23:45  68 18  99 Room Air  21 8/5/18 23:37  60 18  99 Room Air  21 8/5/18 21:00      Room Air  


 


8/5/18 20:00  80      


 


8/5/18 20:00 97.0 71 20 142/77 (98) 98   





 97.0       


 


8/5/18 19:42  69 18  99 Room Air  21


 


8/5/18 19:33  61 18  98 Room Air  21


 


8/5/18 16:00 97.3 78 22 152/76 (101) 94   





 97.3       


 


8/5/18 16:00  71      

















Intake and Output  


 


 8/5/18 8/6/18





 19:00 07:00


 


Intake Total 300 ml 240 ml


 


Balance 300 ml 240 ml


 


  


 


Intake Oral 300 ml 240 ml


 


# Voids  2








Laboratory Tests


8/6/18 06:40: 


White Blood Count 7.5, Red Blood Count 3.91L, Hemoglobin 11.1L, Hematocrit 32.3L

, Mean Corpuscular Volume 83, Mean Corpuscular Hemoglobin 28.3, Mean 

Corpuscular Hemoglobin Concent 34.2, Red Cell Distribution Width 11.9, Platelet 

Count 86L, Mean Platelet Volume 9.9, Neutrophils (%) (Auto) , Lymphocytes (%) (

Auto) , Monocytes (%) (Auto) , Eosinophils (%) (Auto) , Basophils (%) (Auto) , 

Differential Total Cells Counted 100, Neutrophils % (Manual) 84H, Lymphocytes % 

(Manual) 12L, Monocytes % (Manual) 4, Eosinophils % (Manual) 0, Basophils % (

Manual) 0, Band Neutrophils 0, Platelet Estimate DecreasedL, Platelet 

Morphology Normal, Hypochromasia 1+, Anisocytosis 1+, Sodium Level 139, 

Potassium Level 3.5, Chloride Level 102, Carbon Dioxide Level 33H, Anion Gap 4L

, Blood Urea Nitrogen 33H, Creatinine 1.0, Estimat Glomerular Filtration Rate > 

60, Glucose Level 176H, Calcium Level 8.8, Total Bilirubin 0.8, Aspartate Amino 

Transf (AST/SGOT) 32, Alanine Aminotransferase (ALT/SGPT) 76, Alkaline 

Phosphatase 124H, Total Protein 5.9L, Albumin 2.7L, Globulin 3.2, Albumin/

Globulin Ratio 0.8L





Current Medications








 Medications


  (Trade)  Dose


 Ordered  Sig/Marissa


 Route


 PRN Reason  Start Time


 Stop Time Status Last Admin


Dose Admin


 


 Acetaminophen


  (Tylenol)  650 mg  Q4H  PRN


 ORAL


 Mild Pain (Pain Scale 1-3)  8/3/18 16:00


 9/2/18 15:59  8/3/18 17:57


 


 


 Acetaminophen


  (Tylenol)  650 mg  Q4H  PRN


 ORAL


 fever  8/3/18 16:00


 9/2/18 15:59   


 


 


 Albuterol/


 Ipratropium


  (Albuterol/


 Ipratropium)  3 ml  Q4HRT


 HHN


   8/3/18 16:15


 8/8/18 16:14  8/6/18 15:03


 


 


 Apixaban


  (Eliquis)  5 mg  BID


 ORAL


   8/4/18 18:00


 9/3/18 17:59  8/6/18 09:08


 


 


 Aspirin


  (ASA)  81 mg  DAILY


 ORAL


   8/6/18 09:00


 9/3/18 08:59  8/6/18 09:08


 


 


 Atorvastatin


 Calcium


  (Lipitor)  80 mg  BEDTIME


 ORAL


   8/3/18 21:00


 9/2/18 20:59  8/5/18 20:42


 


 


 Clopidogrel


 Bisulfate


  (Plavix)  75 mg  DAILY


 ORAL


   8/6/18 09:00


 9/4/18 08:59  8/6/18 09:08


 


 


 Dextrose


  (Dextrose 50%)  25 ml  STAT  PRN


 IV


 Hypoglycemia  8/3/18 16:00


 9/2/18 15:59   


 


 


 Dextrose


  (Dextrose 50%)  50 ml  STAT  PRN


 IV


 Hypoglycemia  8/3/18 16:00


 9/2/18 15:59   


 


 


 Docusate Sodium


  (Colace)  100 mg  EVERY 12  HOURS


 ORAL


   8/3/18 21:00


 9/2/18 20:59  8/6/18 09:09


 


 


 Doxycycline


 Monohydrate


  (Vibramycin)  100 mg  EVERY 12  HOURS


 ORAL


   8/3/18 21:00


 8/10/18 20:59  8/6/18 09:08


 


 


 Famotidine


  (Pepcid)  20 mg  BID


 ORAL


   8/3/18 18:00


 9/2/18 17:59  8/6/18 09:08


 


 


 Morphine Sulfate


  (Morphine


 Sulfate)  2 mg  Q4H  PRN


 IVP


 Moderate Pain (Pain Scale 4-6)  8/3/18 16:00


 8/10/18 15:59   


 


 


 Morphine Sulfate


  (Morphine


 Sulfate)  4 mg  Q4H  PRN


 IVP


 Severe Pain (Pain Scale 7-10)  8/3/18 16:00


 8/10/18 15:59   


 


 


 Ondansetron HCl


  (Zofran)  4 mg  Q6H  PRN


 IVP


 Nausea & Vomiting  8/3/18 16:00


 9/2/18 15:59   


 


 


 Polyethylene


 Glycol


  (Miralax)  17 gm  Q12H  PRN


 ORAL


 Constipation  8/5/18 20:30


 9/4/18 20:29  8/5/18 20:42


 


 


 Prednisone


  (predniSONE)  40 mg  DAILY


 ORAL


   8/6/18 09:00


 9/5/18 08:59  8/6/18 09:08


 


 


 Zolpidem Tartrate


  (Ambien)  5 mg  HSPRN  PRN


 ORAL


 Insomnia  8/5/18 02:00


 8/12/18 01:59  8/6/18 00:14


 

















Jonathan Allred MD Aug 6, 2018 15:49

## 2018-08-07 NOTE — CARDIOLOGY PROGRESS NOTE
Assessment/Plan


Assessment/Plan


perm afib 


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 


hs of cm with subsequent resolution 


copd 


abn lft new 


thrombocytopna 


new 


cad s/p pci 


chronic chf 


MR 








chf sx seem to be mild 


he has noted to have resolution of his cm in 6/2018at [facility] confirmed on echo 

here to day 





off bb but now heart rate mildy increasedsd 


tele reviewed 





MR not confirmed on echo today 





swtich to low dose po lasix 





he dislike his persent facility which is one of the main reasons he came to er


coreg stat 6.25 bid seems better hr control no sig pauses or jorge l 


likely ok to dc form cv point of view needs outpt cardiology  fu





Subjective


Cardiovascular:  Denies: chest pain, irregular heart rate, lightheadedness


Respiratory:  Denies: shortness of breath


Gastrointestinal/Abdominal:  Denies: abdominal pain


Genitourinary:  Denies: burning





Objective





Last 24 Hour Vital Signs








  Date Time  Temp Pulse Resp B/P (MAP) Pulse Ox O2 Delivery O2 Flow Rate FiO2


 


8/7/18 11:34  71      


 


8/7/18 11:30  64 16  97 Room Air  21 8/7/18 11:17  61 15  94 Room Air  21 8/7/18 09:21  65      


 


8/7/18 09:16  68      


 


8/7/18 09:11  60      


 


8/7/18 08:45      Room Air  


 


8/7/18 08:25 97.3 75 18 130/60 (83) 96   





 97.3       


 


8/7/18 08:10  79  130/66    


 


8/7/18 07:55  57      


 


8/7/18 07:40  62 17  98 Room Air  21 8/7/18 07:27  58 16  95 Room Air  21 8/7/18 04:00  66      


 


8/7/18 04:00 97.0 75 18 125/60 (81) 96   





 97.0       


 


8/7/18 03:01      Room Air  21 8/7/18 02:59      Room Air  21 8/7/18 00:00 97.8 84 18 103/65 (78) 96   





 97.8       


 


8/7/18 00:00  81      


 


8/6/18 23:20  82 16  99 Room Air  21 8/6/18 23:15  79 16  97 Room Air  21 8/6/18 21:00      Room Air  


 


8/6/18 20:32  78  120/80    


 


8/6/18 20:00 97.2 76 20 120/80 (93) 98   





 97.2       


 


8/6/18 20:00  76      


 


8/6/18 18:40  88 16  99 Room Air  21


 


8/6/18 18:30  83 16  98 Room Air  21


 


8/6/18 16:00 97.0 89 20 129/85 (100) 100   





 97.0       


 


8/6/18 16:00  86      


 


8/6/18 15:17  86 16  99 Room Air  21


 


8/6/18 15:03  72 16  98 Room Air  21








General Appearance:  no apparent distress, alert


Neck:  supple


Cardiovascular:  irregularly irregular


Respiratory/Chest:  decreased breath sounds


Abdomen:  normal bowel sounds, non tender, soft


Extremities:  no swelling











Intake and Output  


 


 8/6/18 8/7/18





 19:00 07:00


 


Intake Total 500 ml 870 ml


 


Output Total  1000 ml


 


Balance 500 ml -130 ml


 


  


 


Intake Oral 500 ml 360 ml


 


IV Total  510 ml


 


Output Urine Total  1000 ml


 


# Voids 5 


 


# Bowel Movements 1 











Laboratory Tests








Test


  8/6/18


19:35 8/7/18


06:40


 


Reticulocyte Count


  0.6 %


(0.0-2.0) 


 


 


Fibrinogen


  288 mg/dL


(200-400) 


 


 


Iron Level


  58 ug/dL


() 


 


 


Total Iron Binding Capacity


  206 ug/dL


(250-450)  L 


 


 


Percent Iron Saturation 28 % (15-50)   


 


Unsaturated Iron Binding


  148 ug/dL


(112-346) 


 


 


Ferritin


  316 NG/ML


(8-388) 


 


 


Folate


  18.6 NG/ML


(8.6-58.9) 


 


 


Thyroid Stimulating Hormone


(TSH) 0.546 uiU/mL


(0.358-3.740) 


 


 


Hepatitis A IgM Antibody


  Negative


(Negative) 


 


 


Hepatitis B Surface Antigen


  Negative


(Negative) 


 


 


Hepatitis B Core IgM Antibody


  Negative


(Negative) 


 


 


Hepatitis C Antibody


  >11.0 s/co


ratio 


 


 


HIV (1&2) Antibody Rapid


  Negative


(NEGATIVE) 


 


 


Lipase


  


  165 U/L


()

















Zach Fonseca MD Aug 7, 2018 13:43

## 2018-08-07 NOTE — CONSULTATION
History of Present Illness


General


Date patient seen:  Aug 6, 2018


Chief Complaint:  Generalized Weakness





Present Illness


HPI


66 year old male with multiple medical comorbidities including COPD and CHF, 

who presented to the ED with complaints of worsening generalized weakness and 

one week of increased shortness of breath. The pt has hx of insomnia and 

anxiety. the pt was given Ambien. the pt pw anxiety and worried about his 

medical condition.


Allergies:  


Coded Allergies:  


     AMPICILLIN (Verified  Allergy, Unknown, 8/3/18)


Uncoded Allergies:  


     PENICILLIN (Allergy, Unknown, 8/3/18)





Medication History


Scheduled


Apixaban (Eliquis), 5 MG PO TWICE A DAY, (Reported)


Aspirin Ec* (Aspirin Ec*), 81 MG ORAL DAILY, (Reported)


Atorvastatin Calcium* (Atorvastatin Calcium*), 80 MG ORAL BEDTIME, (Reported)


Carvedilol (Coreg), 12.5 MG ORAL EVERY 12 HOURS, (Reported)


Clopidogrel Bisulfate* (Plavix*), 75 MG ORAL DAILY, (Reported)


Docusate Sodium* (Docusate Sodium*), 100 MG ORAL Q12HR, (Reported)


Famotidine (Famotidine), 20 MG ORAL TWICE A DAY, (Reported)


Furosemide (Furosemide), 40 MG PO DAILY, (Reported)


Ipratropium/Albuterol Sulfate (DuoNeb 0.5-3(2.5)mg/3ml), 3 ML HHN Q4HR, (

Reported)


Prednisone* (Prednisone*), 30 MG ORAL DAILY, (Reported)


Prednisone* (Prednisone*), 30 MG ORAL DAILY, (Reported)





Scheduled PRN


Acetaminophen* (Acetaminophen 325MG Tablet*), 650 MG ORAL Q4H PRN for Mild Pain/

Temp > 100.5, (Reported)


Doxycycline Hyclate* (Vibramycin*), 100 MG ORAL EVERY 12 HOURS PRN for x3 days, 

(Reported)


Polyethylene Glycol 3350* (Miralax*), 17 GM ORAL Q12HR PRN for Constipation, (

Reported)


Zolpidem Tartrate* (Ambien*), 5 MG ORAL BEDTIME PRN for Insomnia, (Reported)





Discontinued Medications


Rosuvastatin Calcium* (Crestor*), 5 MG ORAL DAILY, (Reported)


   Discontinued Reason: Therapy completed





Patient History


Limited by:  medical condition


History Provided By:  Patient, Medical Record, PMD


Healthcare decision maker


PATIENT


Resuscitation status





Advanced Directive on File








Past Medical/Surgical History


Past Medical/Surgical History:  


(1) COPD (chronic obstructive pulmonary disease)


(2) Atrial fibrillation


(3) CHF (congestive heart failure)


(4) Episode of generalized weakness


(5) Choledocholithiasis


(6) Cholecystitis





Review of Systems


Psychiatric:  Reports: prior hx, anxiety, emotional problems





Physical Exam


General Appearance:  no apparent distress, alert


Neurologic:  oriented x 3, responsive, depressed affect





Last 24 Hour Vital Signs








  Date Time  Temp Pulse Resp B/P (MAP) Pulse Ox O2 Delivery O2 Flow Rate FiO2


 


8/7/18 11:34  71      


 


8/7/18 11:30  64 16  97 Room Air  21 8/7/18 11:17  61 15  94 Room Air  21 8/7/18 09:21  65      


 


8/7/18 09:16  68      


 


8/7/18 09:11  60      


 


8/7/18 08:45      Room Air  


 


8/7/18 08:25 97.3 75 18 130/60 (83) 96   





 97.3       


 


8/7/18 08:10  79  130/66    


 


8/7/18 07:55  57      


 


8/7/18 07:40  62 17  98 Room Air  21 8/7/18 07:27  58 16  95 Room Air  21 8/7/18 04:00  66      


 


8/7/18 04:00 97.0 75 18 125/60 (81) 96   





 97.0       


 


8/7/18 03:01      Room Air  21 8/7/18 02:59      Room Air  21 8/7/18 00:00 97.8 84 18 103/65 (78) 96   





 97.8       


 


8/7/18 00:00  81      


 


8/6/18 23:20  82 16  99 Room Air  21 8/6/18 23:15  79 16  97 Room Air  21 8/6/18 21:00      Room Air  


 


8/6/18 20:32  78  120/80    


 


8/6/18 20:00 97.2 76 20 120/80 (93) 98   





 97.2       


 


8/6/18 20:00  76      


 


8/6/18 18:40  88 16  99 Room Air  21 8/6/18 18:30  83 16  98 Room Air  21 8/6/18 16:00 97.0 89 20 129/85 (100) 100   





 97.0       


 


8/6/18 16:00  86      


 


8/6/18 15:17  86 16  99 Room Air  21


 


8/6/18 15:03  72 16  98 Room Air  21

















Intake and Output  


 


 8/6/18 8/7/18





 19:00 07:00


 


Intake Total 500 ml 870 ml


 


Output Total  1000 ml


 


Balance 500 ml -130 ml


 


  


 


Intake Oral 500 ml 360 ml


 


IV Total  510 ml


 


Output Urine Total  1000 ml


 


# Voids 5 


 


# Bowel Movements 1 











Laboratory Tests








Test


  8/6/18


19:35 8/7/18


06:40


 


Reticulocyte Count


  0.6 %


(0.0-2.0) 


 


 


Fibrinogen


  288 mg/dL


(200-400) 


 


 


Iron Level


  58 ug/dL


() 


 


 


Total Iron Binding Capacity


  206 ug/dL


(250-450)  L 


 


 


Percent Iron Saturation 28 % (15-50)   


 


Unsaturated Iron Binding


  148 ug/dL


(112-346) 


 


 


Ferritin


  316 NG/ML


(8-388) 


 


 


Folate


  18.6 NG/ML


(8.6-58.9) 


 


 


Thyroid Stimulating Hormone


(TSH) 0.546 uiU/mL


(0.358-3.740) 


 


 


Hepatitis A IgM Antibody


  Negative


(Negative) 


 


 


Hepatitis B Surface Antigen


  Negative


(Negative) 


 


 


Hepatitis B Core IgM Antibody


  Negative


(Negative) 


 


 


Hepatitis C Antibody


  >11.0 s/co


ratio 


 


 


HIV (1&2) Antibody Rapid


  Negative


(NEGATIVE) 


 


 


Lipase


  


  165 U/L


()








Height (Feet):  5


Height (Inches):  9.00


Weight (Pounds):  160


Medications





Current Medications








 Medications


  (Trade)  Dose


 Ordered  Sig/Marissa


 Route


 PRN Reason  Start Time


 Stop Time Status Last Admin


Dose Admin


 


 Acetaminophen


  (Tylenol)  650 mg  Q4H  PRN


 ORAL


 Mild Pain (Pain Scale 1-3)  8/3/18 16:00


 9/2/18 15:59  8/3/18 17:57


 


 


 Acetaminophen


  (Tylenol)  650 mg  Q4H  PRN


 ORAL


 fever  8/3/18 16:00


 9/2/18 15:59   


 


 


 Albuterol/


 Ipratropium


  (Albuterol/


 Ipratropium)  3 ml  Q4HRT


 HHN


   8/3/18 16:15


 8/8/18 16:14  8/7/18 11:17


 


 


 Apixaban


  (Eliquis)  5 mg  BID


 ORAL


   8/4/18 18:00


 9/3/18 17:59  8/7/18 08:09


 


 


 Aspirin


  (ASA)  81 mg  DAILY


 ORAL


   8/6/18 09:00


 9/3/18 08:59  8/7/18 08:09


 


 


 Atorvastatin


 Calcium


  (Lipitor)  80 mg  BEDTIME


 ORAL


   8/3/18 21:00


 9/2/18 20:59  8/6/18 20:28


 


 


 Carvedilol


  (Coreg)  6.25 mg  EVERY 12  HOURS


 ORAL


   8/6/18 21:00


 9/5/18 20:59  8/7/18 08:10


 


 


 Clopidogrel


 Bisulfate


  (Plavix)  75 mg  DAILY


 ORAL


   8/6/18 09:00


 9/4/18 08:59  8/7/18 08:09


 


 


 Dextrose


  (Dextrose 50%)  25 ml  STAT  PRN


 IV


 Hypoglycemia  8/3/18 16:00


 9/2/18 15:59   


 


 


 Dextrose


  (Dextrose 50%)  50 ml  STAT  PRN


 IV


 Hypoglycemia  8/3/18 16:00


 9/2/18 15:59   


 


 


 Docusate Sodium


  (Colace)  100 mg  EVERY 12  HOURS


 ORAL


   8/3/18 21:00


 9/2/18 20:59  8/7/18 08:09


 


 


 Doxycycline


 Monohydrate


  (Vibramycin)  100 mg  EVERY 12  HOURS


 ORAL


   8/3/18 21:00


 8/10/18 20:59  8/7/18 08:10


 


 


 Famotidine


  (Pepcid)  20 mg  BID


 ORAL


   8/3/18 18:00


 9/2/18 17:59  8/7/18 08:09


 


 


 Morphine Sulfate


  (Morphine


 Sulfate)  2 mg  Q4H  PRN


 IVP


 Moderate Pain (Pain Scale 4-6)  8/3/18 16:00


 8/10/18 15:59   


 


 


 Morphine Sulfate


  (Morphine


 Sulfate)  4 mg  Q4H  PRN


 IVP


 Severe Pain (Pain Scale 7-10)  8/3/18 16:00


 8/10/18 15:59   


 


 


 Ondansetron HCl


  (Zofran)  4 mg  Q6H  PRN


 IVP


 Nausea & Vomiting  8/3/18 16:00


 9/2/18 15:59   


 


 


 Polyethylene


 Glycol


  (Miralax)  17 gm  Q12H  PRN


 ORAL


 Constipation  8/5/18 20:30


 9/4/18 20:29  8/5/18 20:42


 


 


 Prednisone


  (predniSONE)  30 mg  DAILY


 ORAL


   8/7/18 09:00


 9/6/18 08:59  8/7/18 08:13


 


 


 Sodium Chloride  1,000 ml @ 


 50 mls/hr  Q20H


 IV


   8/6/18 20:30


 9/5/18 20:29  8/6/18 20:48


 


 


 Zolpidem Tartrate


  (Ambien)  5 mg  HSPRN  PRN


 ORAL


 Insomnia  8/5/18 02:00


 8/12/18 01:59  8/6/18 00:14


 











Assessment/Plan


Status:  stable


Assessment/Plan


Anxiety d/o


cognitive impairment





-Ambien prn


-Ativan prn


-Provided ro/Rodri Alfred MD Aug 7, 2018 15:01

## 2018-08-07 NOTE — CONSULTATION
DATE OF CONSULTATION:  08/06/2018



HEMATOLOGY/ONCOLOGY CONSULTATION



CONSULTING PHYSICIAN:  Jack Benitez M.D.



REQUESTING PHYSICIAN:  Gary Peter M.D.



REASON FOR CONSULTATION:  Evaluation of pancytopenia.



IDENTIFYING DATA:  Dear Dr. Peter and Sumaya Salud,



Thank you for this consultation.



The patient is a pleasant 66-year-old male.  He has a past medical

history, which is significant for bradycardia, history of CHF, history of

non-STEMI, and atrial fibrillation, at this time presents to the hospital.

He has been seen by Cardiology, presents with CHF exacerbation.  He is a

[facility] patient.  He has been admitted under the care of Dr. Peter, seen by Dr. Alejo, presents with generalized weakness, shortness

of breath, increased orthopnea, noted to have pancytopenia.

Hematology/Oncology Service was consulted for further evaluation and

treatment.



PAST MEDICAL HISTORY:  CHF and COPD.



ALLERGIES:  Ampicillin.



MEDICATIONS:  Coreg, Lasix, Crestor.



SOCIAL HISTORY:  Lives in [facility] and [facility].  He is an active smoker.



REVIEW OF SYSTEMS:  Otherwise negative besides as noted in the history of

present illness.  The patient does have PND, cough, orthopnea, and

weakness.



PHYSICAL EXAMINATION:

VITAL SIGNS:  Reviewed.

GENERAL:  No acute distress.

PULMONARY:  Decreased breath sounds.

CARDIOVASCULAR:  Regular rate and rhythm.  No M/G/R.

ABDOMEN:  Soft, nontender, and nondistended.

EXTREMITIES:  No cyanosis, swelling, or edema.



LABORATORY AND DIAGNOSTIC DATA:  WBC of 3, hemoglobin 11, and platelet

count 86,000.



ASSESSMENT AND RECOMMENDATIONS:

1. Pancytopenia, potentially secondary to liver disease, bilirubin

elevated on admission.  The patient had transaminitis on admission as

well.  MCV is elevated.  At this time, obtain hepatitis panel and HIV.

Ultrasound of the abdomen reviewed.  No evidence of cirrhosis.  We will

continue to closely monitor.  Hemoglobin goal is above 7.  Monitor

platelet count closely.

2. Anemia of chronic disease.  Obtain iron panel, folic acid,

fibrinogen, uric acid, TSH, and reticulocyte count.

3. Anemia due to underlying congestive heart failure, decompensated

state.

4. Chronic obstructive pulmonary disease exacerbation.

5. Possible viral urinary tract infection.

6. Transaminitis, hepatic congestion questionable.

7. Deep venous thrombosis prophylaxis, heparin subcutaneous.



I appreciate the consultation.









  ______________________________________________

  Jack Benitez M.D.





DR:  MICHAEL

D:  08/06/2018 19:21

T:  08/07/2018 04:07

JOB#:  [id number]

CC:

## 2018-08-07 NOTE — DIAGNOSTIC IMAGING REPORT
Indication: Abdominal Pain

 

Technique: 5.4 mCi of technetium 99 m-Choletec was injected intravenously. Planar

imaging of the abdomen was then performed every 3 minutes up to 60 minutes. Oblique

views were also obtained.

 

Findings: There is prompt uptake within the liver with subsequent washout of

radiotracer from the liver on subsequent imaging. There is excretion into the biliary

ducts.  Gallbladder activity is present by 30 minutes indicating patency of the

cystic duct. Some bowel activity is noted although less than expected. The common

bile duct appears mildly dilated.

 

IMPRESSION:  

*  No evidence of acute cholecystitis. Gallbladder activity is noted indicating

patency of the cystic duct.

*  Some radiotracer noted in the small bowel however less than expected. There is

apparent mild dilatation of the common bile duct. Potential considerations include a

degree distal common bile duct obstruction or sphincter of Oddi dysfunction.

Correlate clinically. Consider ERCP or MRCP.

## 2018-08-07 NOTE — DISCHARGE SUMMARY
Discharge Summary


Hospital Course


Date of Admission


Aug 3, 2018 at 14:35


Date of Discharge


8/7/2018


Admitting Diagnosis


GENERALIZED WEAKNESS


HPI


Norberto Juarez is a 66 year old male who was admitted on Aug 3, 2018 at 14:35 

for Generalized Weakness


He has a history of multiple medical problems including COPD, CHF who presented 

to the ED w/ 1 mo of worsening Generalized weakness and one week of increased 

shortness of breath and FRITZ. Pt had been living in a board and care and reports 

limited access to healthy food. Food provided at the facility is not c/w 

recommendations he has been given for a cardiac/low na diet. Over the 2 days 

prior to admission he had little po intake due to anorexia and general malaise. 


At baseline he is able to walk for > 1 block on flat ground, but was limited to 

20-30 feet. He also reported Orthopnea. Pt felt  his overall health had 

significantly declined prompting him to present to the ED. 





In the ED pt was found to be in decompensated CHF w/ elevated BNP and COPD 

exacerbation w/ expiratory wheeze. He was started on lasix IV, Steroids and 

nebs with some improvement in sob.


Consultations


Pulmonary


Cardiology


Gastroenterology


Psychiatry


Procedures


Echocardiogram


[facility]


Hospital Course


Patient admitted and found to have pneumonia on CXR requiring IV antibiotics


He improved with steroids, nebs and O2 support


He was continued on IV diuresis and was able to be discharged to SNF in stable 

codition





Discharge Medications


Continued Medications:  


Acetaminophen* (Acetaminophen 325MG Tablet*) 325 Mg Tablet


650 MG ORAL Q4H PRN for Mild Pain/Temp > 100.5, TAB (This prescription has been 

renewed)





Apixaban (Eliquis) 5 Mg Tablet


5 MG PO TWICE A DAY, TAB (This prescription has been renewed)





Aspirin Ec* (Aspirin Ec*) 81 Mg Tablet.dr


81 MG ORAL DAILY, TAB (This prescription has been renewed)





Atorvastatin Calcium* (Atorvastatin Calcium*) 40 Mg Tablet


80 MG ORAL BEDTIME, TAB (This prescription has been renewed)





Carvedilol (Coreg) 3.125 Mg Tablet


12.5 MG ORAL EVERY 12 HOURS, TAB (This prescription has been renewed)





Clopidogrel Bisulfate* (Plavix*) 75 Mg Tablet


75 MG ORAL DAILY, TAB (This prescription has been renewed)





Docusate Sodium* (Docusate Sodium*) 100 Mg Capsule


100 MG ORAL Q12HR, CAP (This prescription has been renewed)





Doxycycline Hyclate* (Vibramycin*) 100 Mg Capsule


100 MG ORAL EVERY 12 HOURS PRN for x3 days for 3 Days, #3 CAP 0 Refills (This 

prescription has been renewed)





Famotidine (Famotidine) 20 Mg Tablet


20 MG ORAL TWICE A DAY, #60 TAB 0 Refills (This prescription has been renewed)





Furosemide (Furosemide) 10 Mg/1 Ml Solution


40 MG PO DAILY (This prescription has been renewed)





Ipratropium/Albuterol Sulfate (DuoNeb 0.5-3(2.5)mg/3ml) 3 Ml Ampul.neb


3 ML HHN Q4HR, EA (This prescription has been renewed)





Polyethylene Glycol 3350* (Miralax*) 17 Gm Powd.pack


17 GM ORAL Q12HR PRN for Constipation, PACKET (This prescription has been 

renewed)





Prednisone* (Prednisone*) 10 Mg Tablet


30 MG ORAL DAILY, #10 TAB 0 Refills (This prescription has been renewed)





Prednisone* (Prednisone*) 10 Mg Tablet


30 MG ORAL DAILY for x1 day for 1 Day, TAB 0 Refills (This prescription has 

been renewed)





Zolpidem Tartrate* (Ambien*) 5 Mg Tablet


5 MG ORAL BEDTIME PRN for Insomnia, TAB (This prescription has been renewed)





 


Discontinued Medications:  


Rosuvastatin Calcium* (Crestor*) 10 Mg Tablet


5 MG ORAL DAILY, TAB











Discharge


Condition Upon Discharge:  improving, stable


Discharge Disposition


Patient was discharged to SNF


Discharge Diagnoses:  


(1) Pneumonia


(2) COPD (chronic obstructive pulmonary disease)


(3) Episode of generalized weakness


(4) Choledocholithiasis


(5) Acute systolic CHF (congestive heart failure)











Gino Stover M.D. Aug 7, 2018 14:04

## 2018-08-07 NOTE — PHYSICIAN QUERY
--------- THIS DOCUMENT IS A PERMANENT PART OF THE MEDICAL RECORD ---------

*****PLEASE COMPLETE THE DOCUMENT BEFORE SIGNING*****



Dear Dr. Alejo                                                         Date: 08/
07/2018

/CDS Name: Bo Cao                   / CDS Phone # [phone]



Exercise your independent professional judgment when responding to query. 
Question asked do not imply a particular answer is desired/expected



Clinical Documentation States: "Acute decompensated CHF" documented in H&P and 
progress notes.



Clinical Findings Show:



BNP: 2106                Diuretic: IV Furosemide

Echocardiogram: EF - 60-65%, Diastolic dysfunction could not be determined A-
Fib.   

Other Meds: Aspirin, Carvidelol



Can you Please Clarify the type of CHF:



Type        [] Systolic

               [x] Diastolic         

               [] Systolic & Diastolic (Combined)

               [] Other: _____________________________

               [] Clinically undeterminable 



Please also document in your Progress Notes and/or Discharge Summary and 
indicate if the condition was present on admission.

[facility]

## 2018-08-07 NOTE — GENERAL PROGRESS NOTE
Assessment/Plan


Status:  stable


Assessment/Plan


1. Pancytopenia, potentially secondary to liver disease, bilirubin elevated on 

admission. The patient had transaminitis on admission as well. MCV is elevated.

  


--> Hepatitis A and B are negative, Hep-C is positive, HIV negative. 


--> Ultrasound of the abdomen reviewed. No evidence of cirrhosis.  


--> We will continue to closely monitor.  


--> Monitor platelet count closely.


2. Anemia of chronic disease. Anemia panel has been reviewed. Will trend CBC 

daily. 


--> Hemoglobin goal is above 7.  


3. Anemia due to underlying congestive heart failure, decompensated state.


4. Chronic obstructive pulmonary disease exacerbation.


5. Possible viral urinary tract infection.


6. Transaminitis, hepatic congestion questionable.


7. Deep venous thrombosis prophylaxis, heparin subcutaneous.





The time the note was entered does not necessarily correspond to the time the 

patient was seen.





Subjective


Date patient seen:  Aug 7, 2018


ROS Limited/Unobtainable:  Yes


Allergies:  


Coded Allergies:  


     AMPICILLIN (Verified  Allergy, Unknown, 8/3/18)


Uncoded Allergies:  


     PENICILLIN (Allergy, Unknown, 8/3/18)


All Systems:  reviewed and negative except above


Subjective


Pt awake and alert. No acute events. HIDA scan pending. DC planning.





Objective





Last 24 Hour Vital Signs








  Date Time  Temp Pulse Resp B/P (MAP) Pulse Ox O2 Delivery O2 Flow Rate FiO2


 


8/7/18 11:34  71      


 


8/7/18 11:30  64 16  97 Room Air  21 8/7/18 11:17  61 15  94 Room Air  21 8/7/18 09:21  65      


 


8/7/18 09:16  68      


 


8/7/18 09:11  60      


 


8/7/18 08:45      Room Air  


 


8/7/18 08:25 97.3 75 18 130/60 (83) 96   





 97.3       


 


8/7/18 08:10  79  130/66    


 


8/7/18 07:55  57      


 


8/7/18 07:40  62 17  98 Room Air  21 8/7/18 07:27  58 16  95 Room Air  21 8/7/18 04:00  66      


 


8/7/18 04:00 97.0 75 18 125/60 (81) 96   





 97.0       


 


8/7/18 03:01      Room Air  21 8/7/18 02:59      Room Air  21 8/7/18 00:00 97.8 84 18 103/65 (78) 96   





 97.8       


 


8/7/18 00:00  81      


 


8/6/18 23:20  82 16  99 Room Air  21


 


8/6/18 23:15  79 16  97 Room Air  21


 


8/6/18 21:00      Room Air  


 


8/6/18 20:32  78  120/80    


 


8/6/18 20:00 97.2 76 20 120/80 (93) 98   





 97.2       


 


8/6/18 20:00  76      


 


8/6/18 18:40  88 16  99 Room Air  21


 


8/6/18 18:30  83 16  98 Room Air  21


 


8/6/18 16:00 97.0 89 20 129/85 (100) 100   





 97.0       


 


8/6/18 16:00  86      


 


8/6/18 15:17  86 16  99 Room Air  21


 


8/6/18 15:03  72 16  98 Room Air  21

















Intake and Output  


 


 8/6/18 8/7/18





 19:00 07:00


 


Intake Total 500 ml 870 ml


 


Output Total  1000 ml


 


Balance 500 ml -130 ml


 


  


 


Intake Oral 500 ml 360 ml


 


IV Total  510 ml


 


Output Urine Total  1000 ml


 


# Voids 5 


 


# Bowel Movements 1 








Laboratory Tests


8/6/18 19:35: 


Reticulocyte Count 0.6, Fibrinogen 288, Iron Level 58, Total Iron Binding 

Capacity 206L, Percent Iron Saturation 28, Unsaturated Iron Binding 148, 

Ferritin 316, Folate 18.6, Thyroid Stimulating Hormone (TSH) 0.546, Hepatitis A 

IgM Antibody Negative, Hepatitis B Surface Antigen Negative, Hepatitis B Core 

IgM Antibody Negative, Hepatitis C Antibody >11.0H, HIV (1&2) Antibody Rapid 

Negative


8/7/18 06:40: Lipase 165


Height (Feet):  5


Height (Inches):  9.00


Weight (Pounds):  160


General Appearance:  no apparent distress, alert


EENT:  PERRL/EOMI


Neck:  normal alignment


Cardiovascular:  normal peripheral pulses


Respiratory/Chest:  normal breath sounds, no respiratory distress


Abdomen:  soft











Jack Benitez MD Aug 7, 2018 14:56

## 2018-08-07 NOTE — GENERAL SURGERY PROGRESS NOTE
General Surgery-Progress Note


Subjective


Additional Comments


abdominal pain improved.  labs okay.  was unable to tolerate MRI





Objective





Last 24 Hour Vital Signs








  Date Time  Temp Pulse Resp B/P (MAP) Pulse Ox O2 Delivery O2 Flow Rate FiO2


 


8/7/18 11:17  61 15  94 Room Air  21


 


8/7/18 09:21  65      


 


8/7/18 09:16  68      


 


8/7/18 09:11  60      


 


8/7/18 08:45      Room Air  


 


8/7/18 08:25 97.3 75 18 130/60 (83) 96   





 97.3       


 


8/7/18 08:10  79  130/66    


 


8/7/18 07:55  57      


 


8/7/18 07:40  62 17  98 Room Air  21 8/7/18 07:27  58 16  95 Room Air  21 8/7/18 04:00  66      


 


8/7/18 04:00 97.0 75 18 125/60 (81) 96   





 97.0       


 


8/7/18 03:01      Room Air  21 8/7/18 02:59      Room Air  21 8/7/18 00:00 97.8 84 18 103/65 (78) 96   





 97.8       


 


8/7/18 00:00  81      


 


8/6/18 23:20  82 16  99 Room Air  21 8/6/18 23:15  79 16  97 Room Air  21


 


8/6/18 21:00      Room Air  


 


8/6/18 20:32  78  120/80    


 


8/6/18 20:00 97.2 76 20 120/80 (93) 98   





 97.2       


 


8/6/18 20:00  76      


 


8/6/18 18:40  88 16  99 Room Air  21 8/6/18 18:30  83 16  98 Room Air  21 8/6/18 16:00 97.0 89 20 129/85 (100) 100   





 97.0       


 


8/6/18 16:00  86      


 


8/6/18 15:17  86 16  99 Room Air  21 8/6/18 15:03  72 16  98 Room Air  21 8/6/18 12:00 97.3 63 20 143/56 (85) 100   





 97.3       


 


8/6/18 12:00  64      








I&O











Intake and Output  


 


 8/6/18 8/7/18





 19:00 07:00


 


Intake Total 500 ml 870 ml


 


Output Total  1000 ml


 


Balance 500 ml -130 ml


 


  


 


Intake Oral 500 ml 360 ml


 


IV Total  510 ml


 


Output Urine Total  1000 ml


 


# Voids 5 


 


# Bowel Movements 1 








Drains:  none


Cardiovascular:  RSR


Respiratory:  clear


Abdomen:  soft, flat, non-tender, present bowel sounds


Extremities:  no cyanosis





Laboratory Tests








Test


  8/6/18


19:35 8/7/18


06:40


 


Reticulocyte Count


  0.6 %


(0.0-2.0) 


 


 


Fibrinogen


  288 mg/dL


(200-400) 


 


 


Iron Level


  58 ug/dL


() 


 


 


Total Iron Binding Capacity


  206 ug/dL


(250-450)  L 


 


 


Percent Iron Saturation 28 % (15-50)   


 


Unsaturated Iron Binding


  148 ug/dL


(112-346) 


 


 


Ferritin


  316 NG/ML


(8-388) 


 


 


Folate


  18.6 NG/ML


(8.6-58.9) 


 


 


Thyroid Stimulating Hormone


(TSH) 0.546 uiU/mL


(0.358-3.740) 


 


 


Hepatitis A IgM Antibody


  Negative


(Negative) 


 


 


Hepatitis B Surface Antigen


  Negative


(Negative) 


 


 


Hepatitis B Core IgM Antibody


  Negative


(Negative) 


 


 


Hepatitis C Antibody


  >11.0 s/co


ratio 


 


 


HIV (1&2) Antibody Rapid


  Negative


(NEGATIVE) 


 


 


Lipase


  


  165 U/L


()











Plan


Problems:  


(1) Choledocholithiasis


Assessment & Plan:  ultrasound with stones and mildly dilated CBD.  LFT's, alk 

phos, bili elevated but trending down now. 





MRCP ordered but patient unable to tolerate MRI machine


pending HIDA scan 


trend labs


will follow with recs.  thank you for this consultation.  





(2) Cholecystitis











Timothy Heath Aug 7, 2018 11:26

## 2018-08-27 NOTE — EMERGENCY ROOM REPORT
History of Present Illness


General


Chief Complaint:  Abdominal Pain


Source:  Patient, Medical Record, EMS





Present Illness


HPI


Patient presents with 2-1/2 days of intermittent right lower quadrant and flank 

pain.  He says it woke him up from sleep that when it first happened.  It was 

severe at that time.  It went away on its own.  The next day it returned.  He 

feels like someone stabbing a knife in his lower abdomen.  He status the pain 

is 6/10 at this time.  He denies any change in his bowels, dysuria, hematuria.  

He's had no nausea or vomiting.  No RUQ or back pain.  No dysuria or hematuria.

  No prior h/o stones.





The patient was admitted August 3 for pneumonia and weaknes.  He states he 

stopped smoking at that time.  He denies having productive cough at this time.  

He's been able to put on weights at the skilled nursing facility and feels 

stronger.





Discharge dx 8/7:


(1) Pneumonia


(2) COPD (chronic obstructive pulmonary disease)


(3) Episode of generalized weakness


(4) Choledocholithiasis


(5) Acute systolic CHF (congestive heart failure)








HIDA scan done 8/7:


IMPRESSION:  


*  No evidence of acute cholecystitis. Gallbladder activity is noted indicating 

patency of the cystic duct.  *  Some radiotracer noted in the small bowel 

however less than expected. There is apparent mild dilatation of the common 

bile duct. Potential considerations include a degree distal common bile duct 

obstruction or sphincter of Oddi dysfunction.  Correlate clinically. Consider 

ERCP or MRCP.


Allergies:  


Coded Allergies:  


     AMPICILLIN (Verified  Allergy, Unknown, 8/3/18)


Uncoded Allergies:  


     PENICILLIN (Allergy, Unknown, 8/3/18)





Patient History


Past Medical History:  see triage record


Social History:  Denies: smoking - former


Social History Narrative


SNF Born [place] - worked for [employer] and played with Br[employer] Su[employer]


Reviewed Nursing Documentation:  PMH: Agreed; PSxH: Agreed





Nursing Documentation-PMH


Hx Cardiac Problems:  Yes - arrhythmia


Hx Hypertension:  No


Hx Pacemaker:  No


Hx Asthma:  Yes


Hx COPD:  Yes


Hx Diabetes:  No


Hx Cancer:  No


Hx Gastrointestinal Problems:  No


Hx Dialysis:  No


Hx Neurological Problems:  No


Hx Cerebrovascular Accident:  No


Hx Seizures:  No





Physical Exam





Vital Signs








  Date Time  Temp Pulse Resp B/P (MAP) Pulse Ox O2 Delivery O2 Flow Rate FiO2


 


8/27/18 03:16 95.0 80 12 128/74 98 Room Air  





 95.0       








Gastrointestinal:  normal inspection, normal bowel sounds, no mass, no rebound, 

tenderness - RLQ with gurading, rest of abdomen is soft


Musculoskeletal:  other - Dupuytren's contractures both hands





Medical Decision Making


Diagnostic Impression:  


 Primary Impression:  


 Abdominal pain


 Qualified Codes:  R10.31 - Right lower quadrant pain


 Additional Impression:  


 COPD (chronic obstructive pulmonary disease)


 Qualified Codes:  J43.9 - Emphysema, unspecified


ER Course


Patient presents with 2-3 days of right lower quadrant pain.  Differential 

includes appendicitis, diverticulitis, renal stone, constipation, UTI, 

cholecystitis amongst others.  Duration of symptoms is against appendicitis.  

However this needs to be excluded.  Evaluation will be with labs, EKG, chest x-

ray and CT the abdomen.  Laboratory tests will be performed.  The patient will 

receive IV hydration and analgesia.  Pain pattern and location against gall 

bladder as cause.





EKG no injury.  CXR COPD.  Labs with normal CBC, CMP, lipase.





Pain pattern not consistent with CT findings (no RUQ pain).  No evidence of 

cholecystitis clinically. 





Patient sleeping in ED.  Pain resolved.  Discussed outpatient observation.  No 

evidence of surgical pathology.





Discussed with Dr. Meredith who agrees.





(Review of chart after discharge: I was not told that urine was not obtained.  

If persists, UA needs to be checked.)





Laboratory Tests








Test


  8/27/18


03:35


 


White Blood Count


  7.2 K/UL


(4.8-10.8)


 


Red Blood Count


  5.08 M/UL


(4.70-6.10)


 


Hemoglobin


  14.3 G/DL


(14.2-18.0)


 


Hematocrit


  42.8 %


(42.0-52.0)


 


Mean Corpuscular Volume 84 FL (80-99)  


 


Mean Corpuscular Hemoglobin


  28.2 PG


(27.0-31.0)


 


Mean Corpuscular Hemoglobin


Concent 33.5 G/DL


(32.0-36.0)


 


Red Cell Distribution Width


  12.9 %


(11.6-14.8)


 


Platelet Count


  167 K/UL


(150-450)


 


Mean Platelet Volume


  7.0 FL


(6.5-10.1)


 


Neutrophils (%) (Auto)


  78.1 %


(45.0-75.0)  H


 


Lymphocytes (%) (Auto)


  10.2 %


(20.0-45.0)  L


 


Monocytes (%) (Auto)


  9.7 %


(1.0-10.0)


 


Eosinophils (%) (Auto)


  1.3 %


(0.0-3.0)


 


Basophils (%) (Auto)


  0.7 %


(0.0-2.0)


 


Prothrombin Time


  10.2 SEC


(9.30-11.50)


 


Prothrombin Time INR 1.0 (0.9-1.1)  


 


PTT


  32 SEC (23-33)


 


 


Sodium Level


  140 MMOL/L


(136-145)


 


Potassium Level


  4.0 MMOL/L


(3.5-5.1)


 


Chloride Level


  102 MMOL/L


()


 


Carbon Dioxide Level


  34 MMOL/L


(21-32)  H


 


Anion Gap


  4 mmol/L


(5-15)  L


 


Blood Urea Nitrogen


  17 mg/dL


(7-18)


 


Creatinine


  0.9 MG/DL


(0.55-1.30)


 


Estimate Glomerular


Filtration Rate > 60 mL/min


(>60)


 


Glucose Level


  163 MG/DL


()  H


 


Calcium Level


  9.6 MG/DL


(8.5-10.1)


 


Total Bilirubin


  0.9 MG/DL


(0.2-1.0)


 


Aspartate Amino Transferase


(AST) 38 U/L (15-37)


H


 


Alanine Aminotransferase (ALT)


  65 U/L (12-78)


 


 


Alkaline Phosphatase


  122 U/L


()  H


 


Total Creatine Kinase


  39 U/L


()


 


Troponin I


  0.000 ng/mL


(0.000-0.056)


 


Total Protein


  7.6 G/DL


(6.4-8.2)


 


Albumin


  3.5 G/DL


(3.4-5.0)


 


Globulin 4.1 g/dL  


 


Albumin/Globulin Ratio


  0.9 (1.0-2.7)


L


 


Lipase


  96 U/L


()








EKG Diagnostic Results


Rate:  normal


Rhythm:  NSR - with PACs read as a fib





Rhythm Strip Diag. Results


EP Interpretation:  yes


Rhythm:  no PVC's, no ectopy, other - NSR with PACs





Chest X-Ray Diagnostic Results


Chest X-Ray Diagnostic Results :  


   Chest X-Ray Ordered:  Yes


   # of Views/Limited/Complete:  1 View


   Indication:  Other


   Interpretation:  no consolidation, no effusion, no pneumothorax, other - copd


   Impression:  Other


   Electronically Signed by:  Jonathan Lagos MD





CT/MRI/US Diagnostic Results


CT/MRI/US Diagnostic Results :  


   Imaging Test Ordered:  abd/pelvis


   Impression


IMPRESSION:


Choledocholithiasis with mild biliary ductal dilatation. Evaluation with ERCP 

is recommended.


Cholelithiasis with mild ill-defined gallbladder wall and enhancement 

suggestive of cholecystitis. Please correlate clinically.


Other incidental findings as above





Last Vital Signs








  Date Time  Temp Pulse Resp B/P (MAP) Pulse Ox O2 Delivery O2 Flow Rate FiO2


 


8/27/18 09:15 97.0 76 18 112/72 99 Room Air  





 97.0       








Status:  improved


Disposition:  XFER SNF


Condition:  Improved


Scripts


Tramadol Hcl* (ULTRAM*) 50 Mg Tablet


50 MG ORAL Q6H PRN for For Pain, #14 TAB 0 Refills


   Prov: Jonathan Lagos M.D.         8/27/18











Jonathan Lagos M.D. Aug 27, 2018 03:36

## 2018-08-29 NOTE — DIAGNOSTIC IMAGING REPORT
Indication: Chest pain

 

Comparison:  8/27/2018

 

A single view chest radiograph was obtained.

 

Findings:

 

Cardiomediastinal appearance is within normal limits for age.  Pulmonary vascularity

is appropriate. The diaphragmatic contour is smooth and costophrenic angles are

sharp. No pleural effusions are identified. The bones are unremarkable.

 

Impression: No acute findings

## 2018-08-29 NOTE — EMERGENCY ROOM REPORT
History of Present Illness


General


Chief Complaint:  Abdominal Pain


Source:  Patient, EMS





Present Illness


HPI


Patient presents to the emergency room with abdominal pain.  His evaluated by 

me several days ago with different abdominal pain.  He had right lower quadrant 

intermittent pain with no right upper quadrant pain.  Right now the pain is 

moved up into the right upper quadrant.  A CT scan revealed that he had 

choledocholithiasis without cholecystitis.  His other labs at that time were 

normal including white count and LFTs and lipase.  He denies vomiting or 

diarrhea at this time.  The pain is at times severe and is still intermittent 

but has a constant component.  It radiates slightly to his back.





No chest pain, dyspnea, wheezing, rashes, headache, joint pain, back pain, 

dysuria, hematuria.  He has been gaining weight at the SNF.





Recent admission and discharge dx: 


(1) Pneumonia


(2) COPD (chronic obstructive pulmonary disease)


(3) Episode of generalized weakness


(4) Choledocholithiasis


(5) Acute systolic CHF (congestive heart failure)





HIDA scan done 8/7:


IMPRESSION:  


*  No evidence of acute cholecystitis. Gallbladder activity is noted indicating 

patency of the cystic duct.  *  Some radiotracer noted in the small bowel 

however less than expected. There is apparent mild dilatation of the common 

bile duct. Potential considerations include a degree distal common bile duct 

obstruction or sphincter of Oddi dysfunction.  Correlate clinically. Consider 

ERCP or MRCP.


Allergies:  


Coded Allergies:  


     AMPICILLIN (Verified  Allergy, Unknown, 8/3/18)


Uncoded Allergies:  


     PENICILLIN (Allergy, Unknown, 8/3/18)





Patient History


Past Medical History:  see triage record, old chart reviewed


Social History:  Reports: smoking - Prior, drug use - THC


Social History Narrative


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


Reviewed Nursing Documentation:  PMH: Agreed; PSxH: Agreed





Nursing Documentation-PMH


Hx Hypertension:  Yes


Hx Pacemaker:  No


Hx Asthma:  Yes


Hx COPD:  Yes


Hx Diabetes:  No


Hx Cancer:  No


Hx Gastrointestinal Problems:  No


Hx Dialysis:  No


Hx Neurological Problems:  No


Hx Cerebrovascular Accident:  No


Hx Seizures:  No





Review of Systems


All Other Systems:  negative except mentioned in HPI





Physical Exam





Vital Signs








  Date Time  Temp Pulse Resp B/P (MAP) Pulse Ox O2 Delivery O2 Flow Rate FiO2


 


8/29/18 13:21 97.4 86  110/70 96 Room Air  





 97.3       


 


8/29/18 13:30   16     








Sp02 EP Interpretation:  reviewed, normal


General Appearance:  well appearing, no apparent distress, GCS 15, non-toxic


Head:  normocephalic


Eyes:  bilateral eye normal inspection, bilateral eye PERRL


ENT:  moist mucus membranes


Neck:  supple


Respiratory:  lungs clear, normal breath sounds


Cardiovascular #1:  tachycardia, irregularly irregular


Cardiovascular #2:  2+ radial (R)


Gastrointestinal:  normal inspection, normal bowel sounds, no mass, non-

distended, no rebound, guarding - RUQ, tenderness


Musculoskeletal:  back normal, gait/station normal, normal range of motion, 

other - Depuytren's contractures bilat


Neurologic:  alert, oriented x3, grossly normal


Psychiatric:  mood/affect normal


Skin:  normal inspection, warm/dry





Medical Decision Making


Diagnostic Impression:  


 Primary Impression:  


 Cholecystitis


 Additional Impressions:  


 Acute renal failure


 Qualified Codes:  N17.9 - Acute kidney failure, unspecified


 Atrial fibrillation with RVR


 Choledocholithiasis


ER Course


Patient presents now with different pain than when he presented 2 days ago.  

Now in the right upper quadrant.  Based on the CT cholecystitis is high on the 

list.  He will be evaluated with labs and ultrasound.  Patient will be treated 

with mild IV hydration and analgesia.  Other possibilities are PUD, pyelo, 

stone amongst others.





EKG shows atrial fibrillation with rapid ventricular response.  Ultrasound 

shows gallstones and also some thickening of the gallbladder wall.  Labs 

significant for mildly elevated white count.  LFTs are elevated.  He also has 

elevated BUN/creatinine from prior (received IV contrast).  Bili 2.8.





Patient is given diltiazem to control his rate and also increased fluids.  In 

addition antibiotics are ordered.  





Dr. Peter was contacted (requested Dr. Stover) as well as Dr. Heath.





Rate better with fluids and post diltiazem.





Patient improved with treatment.  Consider ERCP.  Condition is serious and 

needing admission to telemetry due to rapid a fib.





Laboratory Tests








Test


  8/29/18


14:30


 


White Blood Count


  11.1 K/UL


(4.8-10.8)  H


 


Red Blood Count


  4.76 M/UL


(4.70-6.10)


 


Hemoglobin


  13.2 G/DL


(14.2-18.0)  L


 


Hematocrit


  40.1 %


(42.0-52.0)  L


 


Mean Corpuscular Volume 84 FL (80-99)  


 


Mean Corpuscular Hemoglobin


  27.7 PG


(27.0-31.0)


 


Mean Corpuscular Hemoglobin


Concent 32.9 G/DL


(32.0-36.0)


 


Red Cell Distribution Width


  12.4 %


(11.6-14.8)


 


Platelet Count


  149 K/UL


(150-450)  L


 


Mean Platelet Volume


  8.1 FL


(6.5-10.1)


 


Neutrophils (%) (Auto)


  82.6 %


(45.0-75.0)  H


 


Lymphocytes (%) (Auto)


  5.7 %


(20.0-45.0)  L


 


Monocytes (%) (Auto)


  11.1 %


(1.0-10.0)  H


 


Eosinophils (%) (Auto)


  0.0 %


(0.0-3.0)


 


Basophils (%) (Auto)


  0.6 %


(0.0-2.0)


 


Prothrombin Time


  12.6 SEC


(9.30-11.50)  H


 


Prothrombin Time INR


  1.2 (0.9-1.1)


H


 


PTT


  39 SEC (23-33)


H


 


Sodium Level


  131 MMOL/L


(136-145)  L


 


Potassium Level


  5.1 MMOL/L


(3.5-5.1)


 


Chloride Level


  95 MMOL/L


()  L


 


Carbon Dioxide Level


  30 MMOL/L


(21-32)


 


Anion Gap


  6 mmol/L


(5-15)


 


Blood Urea Nitrogen


  36 mg/dL


(7-18)  H


 


Creatinine


  2.0 MG/DL


(0.55-1.30)  H


 


Estimate Glomerular


Filtration Rate 33.6 mL/min


(>60)


 


Glucose Level


  185 MG/DL


()  H


 


Calcium Level


  9.2 MG/DL


(8.5-10.1)


 


Total Bilirubin


  2.8 MG/DL


(0.2-1.0)  H


 


Direct Bilirubin


  1.4 MG/DL


(0.0-0.3)  H


 


Aspartate Amino Transferase


(AST) 37 U/L (15-37)


 


 


Alanine Aminotransferase (ALT)


  68 U/L (12-78)


 


 


Alkaline Phosphatase


  165 U/L


()  H


 


Total Creatine Kinase


  20 U/L


()  L


 


Troponin I


  0.000 ng/mL


(0.000-0.056)


 


Total Protein


  7.0 G/DL


(6.4-8.2)


 


Albumin


  2.7 G/DL


(3.4-5.0)  L


 


Globulin 4.3 g/dL  


 


Albumin/Globulin Ratio


  0.6 (1.0-2.7)


L


 


Lipase


  65 U/L


()  L








EKG Diagnostic Results


Rate:  tachycardiac


Rhythm:  other - a fib


ST Segments:  no acute changes





Rhythm Strip Diag. Results


EP Interpretation:  yes


Rhythm:  no PVC's, no ectopy, other - a fib with RVR





Chest X-Ray Diagnostic Results


Chest X-Ray Diagnostic Results :  


   Chest X-Ray Ordered:  Yes


   # of Views/Limited/Complete:  1 View


   Indication:  Other


   EP Interpretation:  Yes


   Interpretation:  no consolidation, no effusion, no pneumothorax, other - COPD


   Impression:  Other





CT/MRI/US Diagnostic Results


CT/MRI/US Diagnostic Results :  


   Imaging Test Ordered:  US abd


   Impression


gall stones with duct dilatation and some wall thickening





Last Vital Signs








  Date Time  Temp Pulse Resp B/P (MAP) Pulse Ox O2 Delivery O2 Flow Rate FiO2


 


8/30/18 00:00 99.0 97 18 107/56 (73) 93   





 99.0       


 


8/29/18 22:15      Room Air  








Status:  improved


Disposition:  ADMITTED AS INPATIENT


Condition:  Serious











Jonathan Lagos M.D. Aug 29, 2018 14:10

## 2018-08-29 NOTE — DIAGNOSTIC IMAGING REPORT
Indication:Abdominal pain

 

Technique: Grayscale and duplex Doppler imaging of the abdomen performed.

 

Comparison: None

 

Findings:

 

There are gallstones present. Thickening of the gallbladder wall and positive

sonographic Antonio's sign per technologist suggestive of acute cholecystitis. Please

correlate clinically.

 

CBD is dilated measuring approximately 10 mm. Recent CT demonstrated CBD stones which

are not appreciated on the current exam. Pancreas and aorta are obscured not well

seen. There is no hydronephrosis. The liver is unremarkable. The spleen is enlarged

measuring 13 to 14 cm. There is no ascites. The main portal vein is patent by Doppler

examination. There is a small cyst noted in the right kidney.

 

IMPRESSION:

 

Suspected acute cholecystitis as discussed above.  Correlate clinically.

 

Biliary ductal dilatation with known recent demonstration of choledocholithiasis.

ERCP recommended.

 

Right renal cyst

 

Splenomegaly

## 2018-08-29 NOTE — HISTORY AND PHYSICAL
History of Present Illness


General


Date patient seen:  Aug 29, 2018


Time patient seen:  23:30


Reason for Hospitalization:  Abdominal Pain





Present Illness


HPI


65 yo man well-known to me from recent admission to [facility] [place] 8/3 to 8/7 for CHF 

excacerbation


returns c/o RUQ abdominal pain. He was seen in the ED 2 times this week for 

abdominal pain- today it was noted that the character and location of his pain 

had changed and further workup revealed acute cholecystitis. He was started on 

IV antibiotics and admitted. He was also noted to be in atrial fibrillation 

with RVR and diltiazem given (has chonric afib) with good results





Currently he complains of RUQ pain, improved with pain meds


No nausea, chest pain or dyspnea








Past Medical/Surgical History:  


(1) COPD (chronic obstructive pulmonary disease)


(2) Atrial fibrillation


(3) CHF (congestive heart failure)





FHx:


Reviewed; not pertinenet for this encounter





SHx: No tobacco/EtOH


Allergies:  


Coded Allergies:  


     AMPICILLIN (Verified  Allergy, Unknown, 8/3/18)


Uncoded Allergies:  


     PENICILLIN (Allergy, Unknown, 8/3/18)





Medication History


Scheduled


Apixaban (Eliquis), 5 MG PO TWICE A DAY, (Reported)


Aspirin Ec* (Aspirin Ec*), 81 MG ORAL DAILY, (Reported)


Atorvastatin Calcium* (Atorvastatin Calcium*), 40 MG ORAL BEDTIME, (Reported)


Carvedilol (Coreg), 12.5 MG ORAL EVERY 12 HOURS, (Reported)


Carvedilol (Coreg), 12.5 MG ORAL EVERY 12 HOURS, (Reported)


Clopidogrel Bisulfate* (Plavix*), 75 MG ORAL DAILY, (Reported)


Docusate Sodium* (Docusate Sodium*), 100 MG ORAL Q12HR, (Reported)


Famotidine (Famotidine), 20 MG ORAL TWICE A DAY, (Reported)


Furosemide (Furosemide), 40 MG PO DAILY, (Reported)


Ipratropium/Albuterol Sulfate (DuoNeb 0.5-3(2.5)mg/3ml), 3 ML HHN Q4HR, (

Reported)


Prednisone* (Prednisone*), 30 MG ORAL DAILY, (Reported)


Prednisone* (Prednisone*), 30 MG ORAL DAILY, (Reported)





Scheduled PRN


Acetaminophen* (Acetaminophen 325MG Tablet*), 650 MG ORAL Q4H PRN for Mild Pain/

Temp > 100.5, (Reported)


Doxycycline Hyclate* (Vibramycin*), 100 MG ORAL EVERY 12 HOURS PRN for x3 days, 

(Reported)


Polyethylene Glycol 3350* (Miralax*), 17 GM ORAL Q12HR PRN for Constipation, (

Reported)


Tramadol Hcl* (Ultram*), 50 MG ORAL Q6H PRN for For Pain


Zolpidem Tartrate* (Ambien*), 5 MG ORAL BEDTIME PRN for Insomnia, (Reported)





Patient History


History Provided By:  Patient, Medical Record


Healthcare decision maker


N


Resuscitation status


Full Code


Advanced Directive on File








Review of Systems


Constitutional:  Reports: weakness


Eye:  Reports: no symptoms


ENT:  Reports: no symptoms


Respiratory:  Reports: no symptoms


Cardiovascular:  Reports: palpitations


Gastrointestinal:  Reports: abdominal pain, nausea


Genitourinary:  Reports: no symptoms


Musculoskeletal:  Reports: no symptoms


Skin:  Reports: no symptoms


Psychiatric:  Reports: no symptoms


Neurological:  Reports: no symptoms


Endocrine:  Reports: no symptoms


Hematologic/Lymphatic:  Reports: no symptoms


All Other Systems:  negative except mentioned in HPI





Physical Exam


General Appearance:  alert, mild distress, thin


Lines, tubes and drains:  peripheral


HEENT:  normocephalic, atraumatic, anicteric, EOMI, supple


Neck:  non-tender, supple


Respiratory/Chest:  chest wall non-tender, no respiratory distress, decreased 

breath sounds


Breasts:  no masses


Cardiovascular/Chest:  normal rate, regularly irregular


Abdomen:  normal bowel sounds, decreased bowel sounds, tender


Skin Exam:  normal pigmentation, warm/dry


Neurologic:  CNs II-XII grossly normal


Lymphatic:  anterior cervical


Musculoskeletal:  atrophy





Last 24 Hour Vital Signs








  Date Time  Temp Pulse Resp B/P (MAP) Pulse Ox O2 Delivery O2 Flow Rate FiO2


 


8/29/18 22:15      Room Air  


 


8/29/18 20:54  97  109/67    


 


8/29/18 20:00 98.1 97 18 109/67 (81) 93   





 98.1       


 


8/29/18 20:00  92      


 


8/29/18 18:10 97.0 91 20 108/61 (77) 94   





 97.0       


 


8/29/18 18:06 97.8 97 21 94/73 96 Room Air  





 97.8       


 


8/29/18 17:20 97.8 97 21 94/73 96 Room Air  





 97.8       


 


8/29/18 15:26  135  110/70    


 


8/29/18 15:08 97.8       


 


8/29/18 15:00 98.3 123 22 98/56 98 Room Air  





 98.3       


 


8/29/18 14:38 97.3       


 


8/29/18 13:30 97.3 86 16 110/70 96 Room Air  





 97.3       


 


8/29/18 13:21 97.4 86  110/70 96 Room Air  





 97.3       











Laboratory Tests








Test


  8/29/18


14:30


 


White Blood Count


  11.1 K/UL


(4.8-10.8)  H


 


Red Blood Count


  4.76 M/UL


(4.70-6.10)


 


Hemoglobin


  13.2 G/DL


(14.2-18.0)  L


 


Hematocrit


  40.1 %


(42.0-52.0)  L


 


Mean Corpuscular Volume 84 FL (80-99)  


 


Mean Corpuscular Hemoglobin


  27.7 PG


(27.0-31.0)


 


Mean Corpuscular Hemoglobin


Concent 32.9 G/DL


(32.0-36.0)


 


Red Cell Distribution Width


  12.4 %


(11.6-14.8)


 


Platelet Count


  149 K/UL


(150-450)  L


 


Mean Platelet Volume


  8.1 FL


(6.5-10.1)


 


Neutrophils (%) (Auto)


  82.6 %


(45.0-75.0)  H


 


Lymphocytes (%) (Auto)


  5.7 %


(20.0-45.0)  L


 


Monocytes (%) (Auto)


  11.1 %


(1.0-10.0)  H


 


Eosinophils (%) (Auto)


  0.0 %


(0.0-3.0)


 


Basophils (%) (Auto)


  0.6 %


(0.0-2.0)


 


Prothrombin Time


  12.6 SEC


(9.30-11.50)  H


 


Prothromb Time International


Ratio 1.2 (0.9-1.1)


H


 


Activated Partial


Thromboplast Time 39 SEC (23-33)


H


 


Sodium Level


  131 MMOL/L


(136-145)  L


 


Potassium Level


  5.1 MMOL/L


(3.5-5.1)


 


Chloride Level


  95 MMOL/L


()  L


 


Carbon Dioxide Level


  30 MMOL/L


(21-32)


 


Anion Gap


  6 mmol/L


(5-15)


 


Blood Urea Nitrogen


  36 mg/dL


(7-18)  H


 


Creatinine


  2.0 MG/DL


(0.55-1.30)  H


 


Estimat Glomerular Filtration


Rate 33.6 mL/min


(>60)


 


Glucose Level


  185 MG/DL


()  H


 


Calcium Level


  9.2 MG/DL


(8.5-10.1)


 


Total Bilirubin


  2.8 MG/DL


(0.2-1.0)  H


 


Direct Bilirubin


  1.4 MG/DL


(0.0-0.3)  H


 


Aspartate Amino Transf


(AST/SGOT) 37 U/L (15-37)


 


 


Alanine Aminotransferase


(ALT/SGPT) 68 U/L (12-78)


 


 


Alkaline Phosphatase


  165 U/L


()  H


 


Total Creatine Kinase


  20 U/L


()  L


 


Troponin I


  0.000 ng/mL


(0.000-0.056)


 


Total Protein


  7.0 G/DL


(6.4-8.2)


 


Albumin


  2.7 G/DL


(3.4-5.0)  L


 


Globulin 4.3 g/dL  


 


Albumin/Globulin Ratio


  0.6 (1.0-2.7)


L


 


Lipase


  65 U/L


()  L








Height (Feet):  5


Height (Inches):  9.00


Weight (Pounds):  150


Medications





Current Medications








 Medications


  (Trade)  Dose


 Ordered  Sig/Marissa


 Route


 PRN Reason  Start Time


 Stop Time Status Last Admin


Dose Admin


 


 Apixaban


  (Eliquis)  5 mg  Q12HR


 ORAL


   8/30/18 09:00


 9/29/18 08:59   


 


 


 Atorvastatin


 Calcium


  (Lipitor)  40 mg  BEDTIME


 ORAL


   8/29/18 21:00


 9/28/18 20:59  8/29/18 20:58


 


 


 Carvedilol


  (Coreg)  12.5 mg  EVERY 12  HOURS


 ORAL


   8/29/18 21:00


 9/28/18 20:59   


 


 


 Ciprofloxacin  200 ml @ 


 200 mls/hr  Q12HR


 IV


   8/29/18 21:00


 9/5/18 20:59  8/29/18 20:58


 


 


 Docusate Sodium


  (Colace)  100 mg  Q12HR


 ORAL


   8/29/18 21:00


 9/28/18 20:59  8/29/18 20:57


 


 


 Famotidine


  (Pepcid)  20 mg  TWICE A  DAY


 ORAL


   8/30/18 09:00


 9/29/18 08:59   


 


 


 Hydralazine HCl


  (Apresoline)  10 mg  Q6H  PRN


 IV


 For High Blood Pressure  8/29/18 20:00


 9/28/18 19:59   


 


 


 Metronidazole  100 ml @ 


 100 mls/hr  Q8HR


 IVPB


   8/29/18 22:00


 9/5/18 21:59  8/29/18 22:36


 


 


 Morphine Sulfate


  (Morphine


 Sulfate)  2 mg  Q4H  PRN


 IVP


 Moderate Pain (Pain Scale 4-6)  8/29/18 20:00


 9/5/18 19:59   


 


 


 Morphine Sulfate


  (Morphine


 Sulfate)  4 mg  Q4H  PRN


 IVP


 Severe Pain (Pain Scale 7-10)  8/29/18 20:00


 9/5/18 19:59   


 


 


 Sodium Chloride  1,000 ml @ 


 100 mls/hr  Q10H


 IV


   8/29/18 20:18


 9/28/18 20:17  8/29/18 20:58


 


 


 Zolpidem Tartrate


  (Ambien)  5 mg  BEDTIME  PRN


 ORAL


 Insomnia  8/29/18 22:45


 9/5/18 22:44  8/29/18 22:47


 











Assessment/Plan


Status:  deteriorating


Status Narrative


65 yo man with h/o chronic systolic and diastolic heart failure, atrial 

fibrillation admitted for RUQ pain and clinical picture and imaging  consistent 

with acute cholecystitis


Assessment/Plan


1) Acute cholecystitis


Choledocholithiasis


r/o Sepsis from a biliary source


-Surgery consult


-elevated bilirubin/transaminitis- will obtain GI consult


-continue IV antibiotics- ciprofloxacin, metronidazole


-check cultures- blood, urine, lactic acid


-NPO for possible procedure





2) Chronic systolic and diastolic heart failure


3) Atrial fibrillation with rapid ventricular response


-Telemetry monitorin


-continue carvedilol, hydralazine, statin


-hold apixaban given surgery





4) DARIAN


-IVF's; likely hypovolemic; doubt sepsis


-avoid nephorotoxins/renally dose meds


-will follow closely





DVT Prophylaxis: SCD's





Code Status: Full





Hospital Classification declaration: Based on this initial evaluation and 

depending on the patient's clinical course I anticipate that this patient will 

require hospitalization for at least 2-3 days





Disposition: Once the patient is stable to leave the hospital I anticipate the 

patient will likely be discharged to the following environment: SNF





I spent 70 minutes on this patients car and 36 minutes was dedicated to 

counseling and care coordination





Time of note may not reflect time of encounter











Gino Stover MD Aug 29, 2018 23:18

## 2018-08-30 NOTE — GENERAL PROGRESS NOTE
Assessment/Plan


Status:  not improved, unchanged


Status Narrative


65 yo man with h/o chronic systolic and diastolic heart failure, atrial 

fibrillation admitted for RUQ pain and clinical picture and imaging  consistent 

with acute cholecystitis


Assessment/Plan


1) Acute cholecystitis


Choledocholithiasis


r/o Sepsis from a biliary source


-Surgery consulted- recs appreciated


-elevated bilirubin/transaminitis- GI consulted- recs appreciated


-continue IV antibiotics- ciprofloxacin, metronidazole


-check cultures- blood, urine, lactic acid


-For EGD/ERCP tomorrow





2) Chronic systolic and diastolic heart failure


3) Atrial fibrillation with rapid ventricular response


-Telemetry monitoring


-continue carvedilol, hydralazine, statin


-hold apixaban given surgery


-Cardiology consult





4) DARIAN- improving near baseline


-IVF's; likely hypovolemic; doubt sepsis


-avoid nephorotoxins/renally dose meds


-will follow closely





DVT Prophylaxis: scd's





Code status: full





Hospital Classification declaration: Based on this initial evaluation, and 

depending on the patient's clinical course, I anticipate that this patient will 

require hospitalization for 2-3 days. 





Disposition: Once the patient is stable to leave the hospital, I anticipate the 

patient will likely be discharged to the following environment:***





I spent 45 minutes on this patient's case, and 23 minutes was dedicated to 

counseling and/or care coordination. 





Time of note may not reflect time of encounter.





Subjective


Date patient seen:  Aug 30, 2018


Time patient seen:  14:10


ROS Limited/Unobtainable:  No


Constitutional:  Reports: weakness


HEENT:  Reports: no symptoms


Cardiovascular:  Reports: no symptoms


Respiratory:  Reports: no symptoms


Gastrointestinal/Abdominal:  Reports: abdominal pain, nausea, poor appetite


Genitourinary:  Reports: no symptoms


Neurologic/Psychiatric:  Reports: no symptoms


Endocrine:  Reports: no symptoms


Hematologic/Lymphatic:  Reports: no symptoms


Allergies:  


Coded Allergies:  


     AMPICILLIN (Verified  Allergy, Unknown, 8/3/18)


Uncoded Allergies:  


     PENICILLIN (Allergy, Unknown, 8/3/18)


All Systems:  reviewed and negative except above


Subjective


Events of overnight reviewed


Chart reviewed


HR improved


Abdominal pain persists- somewhat controlled with pain meds


+nausea no vomiting





Objective





Last 24 Hour Vital Signs








  Date Time  Temp Pulse Resp B/P (MAP) Pulse Ox O2 Delivery O2 Flow Rate FiO2


 


8/30/18 21:26  120  121/72    


 


8/30/18 19:15 98.1       


 


8/30/18 18:45 98.1       


 


8/30/18 16:00 98.1 109 18 92/53 (66) 94   





 98.1       


 


8/30/18 16:00  102      


 


8/30/18 14:51 98.4       


 


8/30/18 12:00 98.4 124 18 95/72 (80) 95   





 98.4       


 


8/30/18 12:00  121      


 


8/30/18 09:23 98.4       


 


8/30/18 09:00  100  107/62    


 


8/30/18 09:00      Room Air  


 


8/30/18 08:00 99.3 127 20 107/62 (77) 95   





 99.3       


 


8/30/18 08:00  121      


 


8/30/18 04:27  133  103/63    


 


8/30/18 04:00 98.4 120 18 103/63 (76) 95   





 98.4       


 


8/30/18 04:00  118      


 


8/30/18 00:00 99.0 97 18 107/56 (73) 93   





 99.0       


 


8/30/18 00:00  117      

















Intake and Output  


 


 8/29/18 8/30/18





 19:00 07:00


 


Intake Total 2055 ml 30 ml


 


Output Total 0 ml 350 ml


 


Balance 2055 ml -320 ml


 


  


 


Intake Oral  30 ml


 


IV Total 2055 ml 


 


Output Urine Total 0 ml 350 ml


 


# Voids  3








Laboratory Tests


8/30/18 05:10: 


White Blood Count 8.2, Red Blood Count 3.82L, Hemoglobin 10.8L, Hematocrit 32.2L

, Mean Corpuscular Volume 84, Mean Corpuscular Hemoglobin 28.2, Mean 

Corpuscular Hemoglobin Concent 33.4, Red Cell Distribution Width 12.6, Platelet 

Count 117L, Mean Platelet Volume 7.9, Neutrophils (%) (Auto) 77.0H, Lymphocytes 

(%) (Auto) 8.1L, Monocytes (%) (Auto) 14.2H, Eosinophils (%) (Auto) 0.1, 

Basophils (%) (Auto) 0.6, Sodium Level 135L, Potassium Level 4.1, Chloride 

Level 99, Carbon Dioxide Level 28, Anion Gap 9, Blood Urea Nitrogen 33H, 

Creatinine 1.4H, Estimat Glomerular Filtration Rate 50.7, Glucose Level 88, 

Calcium Level 8.6, Ferritin 475H, Total Bilirubin 1.6H, Direct Bilirubin 0.8H, 

Aspartate Amino Transf (AST/SGOT) 29, Alanine Aminotransferase (ALT/SGPT) 51, 

Alkaline Phosphatase 127H, Total Protein 6.2L, Albumin 2.3L, Globulin 3.9, 

Albumin/Globulin Ratio 0.6L


Height (Feet):  5


Height (Inches):  9.00


Weight (Pounds):  150


General Appearance:  alert, mild distress


EENT:  PERRL/EOMI, normal ENT inspection, TMs normal, scleral icterus


Neck:  non-tender, supple


Cardiovascular:  normal peripheral pulses, normal rate, regularly irregular, no 

JVD


Respiratory/Chest:  chest wall non-tender, lungs clear


Abdomen:  non tender, decreased bowel sounds, distended, tender


Pelvis:  normal external exam


Extremities:  normal range of motion


Edema:  no edema noted Arm (L), no edema noted Arm (R)


Edema:  trace edema


Neurologic:  CNs II-XII grossly normal


Skin:  normal pigmentation


Lymphatic:  normal anterior cervical (L), normal anterior cervical (R)











Gino Stover MD Aug 30, 2018 22:25

## 2018-08-30 NOTE — GENERAL PROGRESS NOTE
Assessment/Plan


Assessment/Plan


GI Consult


Dictated





Assessment


- cholecystitis


- choledocholithiasis


- anemia


- patient hesitant to accept surgery/lap derrick





Recommendations


- NPO


- IVF


- Abx


- follow LFT


- Will schedule for EGD / ERCP - patient agreeable


- Rec surgical consult


- colonoscopy at later date, if patient agreeable





Subjective


Allergies:  


Coded Allergies:  


     AMPICILLIN (Verified  Allergy, Unknown, 8/3/18)


Uncoded Allergies:  


     PENICILLIN (Allergy, Unknown, 8/3/18)





Objective





Last 24 Hour Vital Signs








  Date Time  Temp Pulse Resp B/P (MAP) Pulse Ox O2 Delivery O2 Flow Rate FiO2


 


8/30/18 04:27  133  103/63    


 


8/30/18 04:00 98.4 120 18 103/63 (76) 95   





 98.4       


 


8/30/18 04:00  118      


 


8/30/18 00:00 99.0 97 18 107/56 (73) 93   





 99.0       


 


8/30/18 00:00  117      


 


8/29/18 22:15      Room Air  


 


8/29/18 20:54  97  109/67    


 


8/29/18 20:00 98.1 97 18 109/67 (81) 93   





 98.1       


 


8/29/18 20:00  92      


 


8/29/18 18:10 97.0 91 20 108/61 (77) 94   





 97.0       


 


8/29/18 18:06 97.8 97 21 94/73 96 Room Air  





 97.8       


 


8/29/18 17:20 97.8 97 21 94/73 96 Room Air  





 97.8       


 


8/29/18 15:26  135  110/70    


 


8/29/18 15:08 97.8       


 


8/29/18 15:00 98.3 123 22 98/56 98 Room Air  





 98.3       


 


8/29/18 14:38 97.3       


 


8/29/18 13:30 97.3 86 16 110/70 96 Room Air  





 97.3       


 


8/29/18 13:21 97.4 86  110/70 96 Room Air  





 97.3       

















Intake and Output  


 


 8/29/18 8/30/18





 19:00 07:00


 


Intake Total 2055 ml 30 ml


 


Output Total 0 ml 350 ml


 


Balance 2055 ml -320 ml


 


  


 


Intake Oral  30 ml


 


IV Total 2055 ml 


 


Output Urine Total 0 ml 350 ml


 


# Voids  3








Laboratory Tests


8/29/18 14:30: 


White Blood Count 11.1H, Red Blood Count 4.76, Hemoglobin 13.2L, Hematocrit 

40.1L, Mean Corpuscular Volume 84, Mean Corpuscular Hemoglobin 27.7, Mean 

Corpuscular Hemoglobin Concent 32.9, Red Cell Distribution Width 12.4, Platelet 

Count 149L, Mean Platelet Volume 8.1, Neutrophils (%) (Auto) 82.6H, Lymphocytes 

(%) (Auto) 5.7L, Monocytes (%) (Auto) 11.1H, Eosinophils (%) (Auto) 0.0, 

Basophils (%) (Auto) 0.6, Prothrombin Time 12.6H, Prothromb Time International 

Ratio 1.2H, Activated Partial Thromboplast Time 39H, Sodium Level 131L, 

Potassium Level 5.1, Chloride Level 95L, Carbon Dioxide Level 30, Anion Gap 6, 

Blood Urea Nitrogen 36H, Creatinine 2.0H, Estimat Glomerular Filtration Rate 

33.6, Glucose Level 185H, Calcium Level 9.2, Total Bilirubin 2.8H, Direct 

Bilirubin 1.4H, Aspartate Amino Transf (AST/SGOT) 37, Alanine Aminotransferase (

ALT/SGPT) 68, Alkaline Phosphatase 165H, Total Creatine Kinase 20L, Troponin I 

0.000, Total Protein 7.0, Albumin 2.7L, Globulin 4.3, Albumin/Globulin Ratio 

0.6L, Lipase 65L


8/30/18 05:10: 


White Blood Count 8.2, Red Blood Count 3.82L, Hemoglobin 10.8L, Hematocrit 32.2L

, Mean Corpuscular Volume 84, Mean Corpuscular Hemoglobin 28.2, Mean 

Corpuscular Hemoglobin Concent 33.4, Red Cell Distribution Width 12.6, Platelet 

Count 117L, Mean Platelet Volume 7.9, Neutrophils (%) (Auto) 77.0H, Lymphocytes 

(%) (Auto) 8.1L, Monocytes (%) (Auto) 14.2H, Eosinophils (%) (Auto) 0.1, 

Basophils (%) (Auto) 0.6, Sodium Level 135L, Potassium Level 4.1, Chloride 

Level 99, Carbon Dioxide Level 28, Anion Gap 9, Blood Urea Nitrogen 33H, 

Creatinine 1.4H, Estimat Glomerular Filtration Rate 50.7, Glucose Level 88, 

Calcium Level 8.6, Total Bilirubin 1.6H, Direct Bilirubin 0.8H, Aspartate Amino 

Transf (AST/SGOT) 29, Alanine Aminotransferase (ALT/SGPT) 51, Alkaline 

Phosphatase 127H, Total Protein 6.2L, Albumin 2.3L, Globulin 3.9, Albumin/

Globulin Ratio 0.6L


Height (Feet):  5


Height (Inches):  9.00


Weight (Pounds):  150











Clara Lenz MD Aug 30, 2018 08:05

## 2018-08-30 NOTE — CONSULTATION
History of Present Illness


General


Date patient seen:  Aug 30, 2018


Chief Complaint:  Abdominal Pain


Reason for Consultation:  cholecystitis





Present Illness


HPI


66 year old male with multiple medical comorbidities including COPD and CHF, 

who presented with abdominal pain. was recently seen for right lower pain and 

discharged but then returned with right upper quadrant pain.  Upon admission 

and work up noted to have elevated LFT's, T bili elevation with elevated Direct 

bilirubin with possible biliary obstruction.  Surgery called to evaluate for 

possible cholecystitis, choledocholithiasis. Patient seen, chart reviewed, 

patient examined.  On exam patient states he has RUQ tenderness.  states he has 

been having pain in RUQ for a few days.  mild nausea intermittent.  no emesis 

currently.


Allergies:  


Coded Allergies:  


     AMPICILLIN (Verified  Allergy, Unknown, 8/3/18)


Uncoded Allergies:  


     PENICILLIN (Allergy, Unknown, 8/3/18)





Medication History


Scheduled


Apixaban (Eliquis), 5 MG PO TWICE A DAY, (Reported)


Aspirin Ec* (Aspirin Ec*), 81 MG ORAL DAILY, (Reported)


Atorvastatin Calcium* (Atorvastatin Calcium*), 40 MG ORAL BEDTIME, (Reported)


Carvedilol (Coreg), 12.5 MG ORAL EVERY 12 HOURS, (Reported)


Carvedilol (Coreg), 12.5 MG ORAL EVERY 12 HOURS, (Reported)


Clopidogrel Bisulfate* (Plavix*), 75 MG ORAL DAILY, (Reported)


Docusate Sodium* (Docusate Sodium*), 100 MG ORAL Q12HR, (Reported)


Famotidine (Famotidine), 20 MG ORAL TWICE A DAY, (Reported)


Furosemide (Furosemide), 40 MG PO DAILY, (Reported)


Ipratropium/Albuterol Sulfate (DuoNeb 0.5-3(2.5)mg/3ml), 3 ML HHN Q4HR, (

Reported)


Prednisone* (Prednisone*), 30 MG ORAL DAILY, (Reported)


Prednisone* (Prednisone*), 30 MG ORAL DAILY, (Reported)





Scheduled PRN


Acetaminophen* (Acetaminophen 325MG Tablet*), 650 MG ORAL Q4H PRN for Mild Pain/

Temp > 100.5, (Reported)


Doxycycline Hyclate* (Vibramycin*), 100 MG ORAL EVERY 12 HOURS PRN for x3 days, 

(Reported)


Polyethylene Glycol 3350* (Miralax*), 17 GM ORAL Q12HR PRN for Constipation, (

Reported)


Tramadol Hcl* (Ultram*), 50 MG ORAL Q6H PRN for For Pain


Zolpidem Tartrate* (Ambien*), 5 MG ORAL BEDTIME PRN for Insomnia, (Reported)





Patient History


Limited by:  medical condition


History Provided By:  Patient, Medical Record, PMD


Healthcare decision maker


N


Resuscitation status


Full Code


Advanced Directive on File








Past Medical/Surgical History


Past Medical/Surgical History:  


(1) Episode of generalized weakness


(2) Pneumonia


(3) Acute systolic CHF (congestive heart failure)


(4) COPD (chronic obstructive pulmonary disease)


(5) Abdominal pain


(6) Choledocholithiasis


(7) Acute renal failure


(8) Cholecystitis


(9) Atrial fibrillation with RVR





Review of Systems


All Other Systems:  negative except mentioned in HPI





Physical Exam


General Appearance:  no apparent distress


Lines, tubes and drains:  peripheral


HEENT:  normocephalic, atraumatic, mucous membranes moist


Neck:  normal inspection


Respiratory/Chest:  lungs clear, normal breath sounds, no respiratory distress, 

no accessory muscle use


Cardiovascular/Chest:  normal peripheral pulses, normal rate


Abdomen:  normal bowel sounds, soft, no organomegaly, no mass, tender


Extremities:  normal inspection


Skin Exam:  normal pigmentation


Neurologic:  alert, responsive





Last 24 Hour Vital Signs








  Date Time  Temp Pulse Resp B/P (MAP) Pulse Ox O2 Delivery O2 Flow Rate FiO2


 


8/30/18 09:53 98.4       


 


8/30/18 09:23 98.4       


 


8/30/18 09:00  100  107/62    


 


8/30/18 09:00      Room Air  


 


8/30/18 08:00 99.3 127 20 107/62 (77) 95   





 99.3       


 


8/30/18 04:27  133  103/63    


 


8/30/18 04:00 98.4 120 18 103/63 (76) 95   





 98.4       


 


8/30/18 04:00  118      


 


8/30/18 00:00 99.0 97 18 107/56 (73) 93   





 99.0       


 


8/30/18 00:00  117      


 


8/29/18 22:15      Room Air  


 


8/29/18 20:54  97  109/67    


 


8/29/18 20:00 98.1 97 18 109/67 (81) 93   





 98.1       


 


8/29/18 20:00  92      


 


8/29/18 18:10 97.0 91 20 108/61 (77) 94   





 97.0       


 


8/29/18 18:06 97.8 97 21 94/73 96 Room Air  





 97.8       


 


8/29/18 17:20 97.8 97 21 94/73 96 Room Air  





 97.8       


 


8/29/18 15:26  135  110/70    


 


8/29/18 15:08 97.8       


 


8/29/18 15:00 98.3 123 22 98/56 98 Room Air  





 98.3       


 


8/29/18 14:38 97.3       


 


8/29/18 13:30 97.3 86 16 110/70 96 Room Air  





 97.3       


 


8/29/18 13:21 97.4 86  110/70 96 Room Air  





 97.3       

















Intake and Output  


 


 8/29/18 8/30/18





 19:00 07:00


 


Intake Total 2055 ml 30 ml


 


Output Total 0 ml 350 ml


 


Balance 2055 ml -320 ml


 


  


 


Intake Oral  30 ml


 


IV Total 2055 ml 


 


Output Urine Total 0 ml 350 ml


 


# Voids  3











Laboratory Tests








Test


  8/29/18


14:30 8/30/18


05:10


 


White Blood Count


  11.1 K/UL


(4.8-10.8)  H 8.2 K/UL


(4.8-10.8)


 


Red Blood Count


  4.76 M/UL


(4.70-6.10) 3.82 M/UL


(4.70-6.10)  L


 


Hemoglobin


  13.2 G/DL


(14.2-18.0)  L 10.8 G/DL


(14.2-18.0)  L


 


Hematocrit


  40.1 %


(42.0-52.0)  L 32.2 %


(42.0-52.0)  L


 


Mean Corpuscular Volume 84 FL (80-99)   84 FL (80-99)  


 


Mean Corpuscular Hemoglobin


  27.7 PG


(27.0-31.0) 28.2 PG


(27.0-31.0)


 


Mean Corpuscular Hemoglobin


Concent 32.9 G/DL


(32.0-36.0) 33.4 G/DL


(32.0-36.0)


 


Red Cell Distribution Width


  12.4 %


(11.6-14.8) 12.6 %


(11.6-14.8)


 


Platelet Count


  149 K/UL


(150-450)  L 117 K/UL


(150-450)  L


 


Mean Platelet Volume


  8.1 FL


(6.5-10.1) 7.9 FL


(6.5-10.1)


 


Neutrophils (%) (Auto)


  82.6 %


(45.0-75.0)  H 77.0 %


(45.0-75.0)  H


 


Lymphocytes (%) (Auto)


  5.7 %


(20.0-45.0)  L 8.1 %


(20.0-45.0)  L


 


Monocytes (%) (Auto)


  11.1 %


(1.0-10.0)  H 14.2 %


(1.0-10.0)  H


 


Eosinophils (%) (Auto)


  0.0 %


(0.0-3.0) 0.1 %


(0.0-3.0)


 


Basophils (%) (Auto)


  0.6 %


(0.0-2.0) 0.6 %


(0.0-2.0)


 


Prothrombin Time


  12.6 SEC


(9.30-11.50)  H 


 


 


Prothromb Time International


Ratio 1.2 (0.9-1.1)


H 


 


 


Activated Partial


Thromboplast Time 39 SEC (23-33)


H 


 


 


Sodium Level


  131 MMOL/L


(136-145)  L 135 MMOL/L


(136-145)  L


 


Potassium Level


  5.1 MMOL/L


(3.5-5.1) 4.1 MMOL/L


(3.5-5.1)


 


Chloride Level


  95 MMOL/L


()  L 99 MMOL/L


()


 


Carbon Dioxide Level


  30 MMOL/L


(21-32) 28 MMOL/L


(21-32)


 


Anion Gap


  6 mmol/L


(5-15) 9 mmol/L


(5-15)


 


Blood Urea Nitrogen


  36 mg/dL


(7-18)  H 33 mg/dL


(7-18)  H


 


Creatinine


  2.0 MG/DL


(0.55-1.30)  H 1.4 MG/DL


(0.55-1.30)  H


 


Estimat Glomerular Filtration


Rate 33.6 mL/min


(>60) 50.7 mL/min


(>60)


 


Glucose Level


  185 MG/DL


()  H 88 MG/DL


()


 


Calcium Level


  9.2 MG/DL


(8.5-10.1) 8.6 MG/DL


(8.5-10.1)


 


Total Bilirubin


  2.8 MG/DL


(0.2-1.0)  H 1.6 MG/DL


(0.2-1.0)  H


 


Direct Bilirubin


  1.4 MG/DL


(0.0-0.3)  H 0.8 MG/DL


(0.0-0.3)  H


 


Aspartate Amino Transf


(AST/SGOT) 37 U/L (15-37)


  29 U/L (15-37)


 


 


Alanine Aminotransferase


(ALT/SGPT) 68 U/L (12-78)


  51 U/L (12-78)


 


 


Alkaline Phosphatase


  165 U/L


()  H 127 U/L


()  H


 


Total Creatine Kinase


  20 U/L


()  L 


 


 


Troponin I


  0.000 ng/mL


(0.000-0.056) 


 


 


Total Protein


  7.0 G/DL


(6.4-8.2) 6.2 G/DL


(6.4-8.2)  L


 


Albumin


  2.7 G/DL


(3.4-5.0)  L 2.3 G/DL


(3.4-5.0)  L


 


Globulin 4.3 g/dL   3.9 g/dL  


 


Albumin/Globulin Ratio


  0.6 (1.0-2.7)


L 0.6 (1.0-2.7)


L


 


Lipase


  65 U/L


()  L 


 


 


Ferritin


  


  475 NG/ML


(8-388)  H








Height (Feet):  5


Height (Inches):  9.00


Weight (Pounds):  150


Medications





Current Medications








 Medications


  (Trade)  Dose


 Ordered  Sig/Marissa


 Route


 PRN Reason  Start Time


 Stop Time Status Last Admin


Dose Admin


 


 Atorvastatin


 Calcium


  (Lipitor)  40 mg  BEDTIME


 ORAL


   8/29/18 21:00


 9/28/18 20:59  8/29/18 20:58


 


 


 Carvedilol


  (Coreg)  12.5 mg  EVERY 12  HOURS


 ORAL


   8/29/18 21:00


 9/28/18 20:59   


 


 


 Ciprofloxacin  200 ml @ 


 200 mls/hr  Q12HR


 IV


   8/29/18 21:00


 9/5/18 20:59  8/30/18 09:23


 


 


 Diltiazem HCl


  (Cardizem)  10 mg  Q3H  PRN


 IV


 HR>130  8/30/18 00:45


 9/29/18 00:44  8/30/18 04:27


 


 


 Docusate Sodium


  (Colace)  100 mg  Q12HR


 ORAL


   8/29/18 21:00


 9/28/18 20:59  8/30/18 09:23


 


 


 Famotidine


  (Pepcid)  20 mg  TWICE A  DAY


 ORAL


   8/30/18 09:00


 9/29/18 08:59  8/30/18 09:23


 


 


 Hydralazine HCl


  (Apresoline)  10 mg  Q6H  PRN


 IV


 For High Blood Pressure  8/29/18 20:00


 9/28/18 19:59   


 


 


 Metronidazole  100 ml @ 


 100 mls/hr  Q8HR


 IVPB


   8/29/18 22:00


 9/5/18 21:59  8/30/18 05:14


 


 


 Morphine Sulfate


  (Morphine


 Sulfate)  2 mg  Q4H  PRN


 IVP


 Moderate Pain (Pain Scale 4-6)  8/29/18 20:00


 9/5/18 19:59   


 


 


 Morphine Sulfate


  (Morphine


 Sulfate)  4 mg  Q4H  PRN


 IVP


 Severe Pain (Pain Scale 7-10)  8/29/18 20:00


 9/5/18 19:59  8/30/18 09:23


 


 


 Sodium Chloride  1,000 ml @ 


 100 mls/hr  Q10H


 IV


   8/29/18 20:18


 9/28/18 20:17  8/30/18 05:15


 


 


 Zolpidem Tartrate


  (Ambien)  5 mg  BEDTIME  PRN


 ORAL


 Insomnia  8/29/18 22:45


 9/5/18 22:44  8/29/18 22:47


 











Assessment/Plan


Problem List:  


(1) Choledocholithiasis


Assessment & Plan:  labs trending down.  


GI input appreciated


defer to GI for ERCP


ICD Codes:  K80.50 - Calculus of bile duct without cholangitis or cholecystitis 

without obstruction


SNOMED:  288051248


(2) Abdominal pain


ICD Codes:  R10.9 - Unspecified abdominal pain


SNOMED:  94463562


Qualifiers:  


   Qualified Codes:  R10.11 - Right upper quadrant pain


(3) Cholecystitis


Assessment & Plan:  will discuss lap derrick with patient


will need to first clear CBD


okay for diet from surgical standpoint


defer to GI for ERCP


will follow with recs.  thank you for this consultation.


ICD Codes:  K81.9 - Cholecystitis, unspecified


SNOMED:  99072945


Status:  stable











Timothy Heath Aug 30, 2018 13:05

## 2018-08-30 NOTE — CONSULTATION
DATE OF CONSULTATION:  08/30/2018



GASTROLOGY CONSULTATION



CHIEF COMPLAINT:  I was asked to see this patient by Dr. Gary Peter

for evaluation of abdominal pain and abnormal liver tests and common bile

duct stones.



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

nursing home, who comes in to the hospital with right upper quadrant

abdominal pain.  He is somewhat of a poor historian and does not give much

details of his pain.  However, the imaging studies with an ultrasound and

a CT scan on this admission were positively identified evidence of common bile

duct stone, gallstones, and radiographic evidence of likely cholecystitis.

The patient complains of severe right upper quadrant abdominal pain and

he has been kept NPO.  He had other medical problems including atrial

fibrillation for which, he is on Eliquis.  His last dose of Eliquis was

yesterday morning.  I explained to him the indications, risks,

alternatives, and possible complications of the ERCP examination including

the pancreatitis and all questions were answered.  He agreed to proceed.

I also explained to him that he will need a definitive laparoscopic

cholecystectomy to remove the gallbladder, but he states he does not want

to have that surgery done.  I have advised him to think about that issue

and discuss the matter with his surgeon further.  I advised him that his

imaging evaluation shows cholecystitis and he has severe right upper

quadrant abdominal tenderness, which is also consistent with the same.  I

advised him that without definitive cholecystectomy, his cholecystitis may

progress, worsen, and he may become severely ill.



PAST MEDICAL HISTORY:  History of atrial fibrillation, history of

hypercholesterolemia, chronic obstructive pulmonary disease, past history

of smoking, and insomnia.



MEDICATIONS:  See the chart list for details.



ALLERGIES:  Noted.



FAMILY HISTORY:  Noncontributory.



SOCIAL HISTORY:  The patient is single.  He has no children.  He previously

smoked but he quit smoking sometime ago.  He does not drink alcohol.



REVIEW OF SYSTEMS:  Otherwise negative.



PHYSICAL EXAMINATION:

GENERAL:  A well-developed, well-nourished, white man, seen in his room.

HEENT:  Normocephalic and atraumatic.  Sclerae were minimally icteric.

Oropharynx clear.

NECK:  Supple.

CHEST:  Coarse breath sounds.

CARDIOVASCULAR:  Revealed a regular rate.

ABDOMEN:  Soft with right upper quadrant tenderness to palpation.  There is

some voluntary guarding, but no rebound.

EXTREMITIES:  Revealed no edema.

NEUROLOGIC:  Grossly nonfocal.



LABORATORY DATA:  Noted.



ASSESSMENT:  This patient presents with choledocholithiasis, which is seen

on imaging study then cholecystitis based both on the imaging results as

well as the physical examination.  The patient was already on antibiotics

as an outpatient.  The patient should be kept NPO and be placed on

broad-spectrum antibiotics.  I will arrange for an ERCP examination for

tomorrow, which will be 48 hours since the last Eliquis dose.  The patient

should also be seen by a surgical consultant and he should undergo a

definitive cholecystectomy after the endoscopic retrograde

cholangiopancreatography examination is complete.  The indications, risks,

alternatives, and possible complications of the endoscopic retrograde

cholangiopancreatography examination were explained to the patient and

informed consent was obtained.



RECOMMENDATIONS:

1. Keep the patient NPO.

2. Antibiotics.

3. Follow laboratory parameters and exam.

4. ERCP tomorrow.

5. Surgical consultation for cholecystectomy.



Thank you for asking me to participate in the care of this patient.









  ______________________________________________

  Clara Lenz M.D.





DR:  CYNTHIA

D:  08/30/2018 09:34

T:  08/30/2018 20:17

JOB#:  [id number]

CC:



ISHMAEL

## 2018-08-31 NOTE — GENERAL PROGRESS NOTE
Assessment/Plan


Problem List:  


(1) Choledocholithiasis


ICD Codes:  K80.50 - Calculus of bile duct without cholangitis or cholecystitis 

without obstruction


SNOMED:  312275601


(2) Cholecystitis


ICD Codes:  K81.9 - Cholecystitis, unspecified


SNOMED:  13504239


(3) Atrial fibrillation with RVR


ICD Codes:  I48.91 - Unspecified atrial fibrillation


SNOMED:  162878043268845


(4) COPD (chronic obstructive pulmonary disease)


ICD Codes:  J44.9 - Chronic obstructive pulmonary disease, unspecified


SNOMED:  41007716


(5) Episode of generalized weakness


ICD Codes:  R53.1 - Weakness


SNOMED:  80227084


(6) Abdominal pain


ICD Codes:  R10.9 - Unspecified abdominal pain


SNOMED:  05135673


Qualifiers:  


   Qualified Codes:  R10.11 - Right upper quadrant pain


Assessment/Plan


Needs ERCP


scope not available until Tuesday


patient stable


abx


advance diet


fu labs


fu surg recs





Subjective


ROS Limited/Unobtainable:  Yes


Allergies:  


Coded Allergies:  


     AMPICILLIN (Verified  Allergy, Unknown, 8/3/18)


Uncoded Allergies:  


     PENICILLIN (Allergy, Unknown, 8/3/18)





Objective





Last 24 Hour Vital Signs








  Date Time  Temp Pulse Resp B/P (MAP) Pulse Ox O2 Delivery O2 Flow Rate FiO2


 


8/31/18 09:00      Room Air  


 


8/31/18 08:58  110  110/60    


 


8/31/18 08:00 98.2 132 18 112/61 (78) 95   





 98.2       


 


8/31/18 04:00  102      


 


8/31/18 04:00 97.6 115 20 134/83 (100) 93   





 97.6       


 


8/31/18 00:00 98.2 120 20 106/61 (76) 94   





 98.2       


 


8/31/18 00:00  165      


 


8/30/18 21:26  120  121/72    


 


8/30/18 21:00      Room Air  


 


8/30/18 20:00 98.4 87 18 121/72 (88) 92   





 98.4       


 


8/30/18 20:00  115      


 


8/30/18 19:15 98.1       


 


8/30/18 18:45 98.1       


 


8/30/18 16:00 98.1 109 18 92/53 (66) 94   





 98.1       


 


8/30/18 16:00  102      


 


8/30/18 14:51 98.4       


 


8/30/18 12:00 98.4 124 18 95/72 (80) 95   





 98.4       


 


8/30/18 12:00  121      

















Intake and Output  


 


 8/30/18 8/31/18





 19:00 07:00


 


Intake Total  100 ml


 


Output Total 400 ml 275 ml


 


Balance -400 ml -175 ml


 


  


 


Intake Oral  100 ml


 


Output Urine Total 400 ml 275 ml


 


# Bowel Movements 1 








Laboratory Tests


8/31/18 09:50: 


Prothrombin Time 12.0H, Prothromb Time International Ratio 1.1, Sodium Level [

Pending], Potassium Level [Pending], Chloride Level [Pending], Carbon Dioxide 

Level [Pending], Blood Urea Nitrogen [Pending], Creatinine [Pending], Estimat 

Glomerular Filtration Rate [Pending], Glucose Level [Pending], Calcium Level [

Pending], Total Bilirubin [Pending], Aspartate Amino Transf (AST/SGOT) [Pending]

, Alanine Aminotransferase (ALT/SGPT) [Pending], Alkaline Phosphatase [Pending]

, Total Protein [Pending], Albumin [Pending], Globulin [Pending]


Height (Feet):  5


Height (Inches):  9.00


Weight (Pounds):  150


General Appearance:  alert


EENT:  normal ENT inspection


Neck:  supple


Cardiovascular:  normal rate


Respiratory/Chest:  decreased breath sounds


Abdomen:  soft, hypoactive bowel sounds, tender


Extremities:  non-tender











Jose Akbar MD Aug 31, 2018 11:39

## 2018-08-31 NOTE — GENERAL PROGRESS NOTE
Assessment/Plan


Status:  stable, not improved


Assessment/Plan


1) Acute cholecystitis


Choledocholithiasis


r/o Sepsis from a biliary source


-Surgery consulted- recs appreciated


-elevated bilirubin/transaminitis- GI consulted- recs appreciated


-continue IV antibiotics- ciprofloxacin, metronidazole


-check cultures- blood, urine, lactic acid- negative to date


- EGD/ERCP  delayed- will attempt Monday





2) Chronic systolic and diastolic heart failure


3) Atrial fibrillation with rapid ventricular response


4) CAD s/p PTCA/stent 6 months ago


-Telemetry monitoring


-continue carvedilol, hydralazine, statin


-hold apixaban given surgery


-Cardiology consulted- recs appreciated- start diltiazem


Start heparin drip, ASA given recent stent and atrial fibrillation





5) DARIAN- improving near baseline


-IVF's; likely hypovolemic; doubt sepsis


-avoid nephrotoxins/renally dose meds


-will follow closely





5) Pancytopenia


-Hematology recs appreciated


-monitor for now





DVT Prophylaxis: scd's, IV heparin





Code status: full





Hospital Classification declaration: Based on this initial evaluation, and 

depending on the patient's clinical course, I anticipate that this patient will 

require hospitalization for 2-3 days. 





Disposition: Once the patient is stable to leave the hospital, I anticipate the 

patient will likely be discharged to the following environment:SNF- comes from 

[facility]





I spent 45 minutes on this patient's case, and 23 minutes was dedicated to 

counseling and/or care coordination. 





Time of note may not reflect time of encounter.





Subjective


Date patient seen:  Aug 31, 2018


Time patient seen:  15:59


ROS Limited/Unobtainable:  No


Constitutional:  Reports: malaise, weakness


HEENT:  Reports: no symptoms


Cardiovascular:  Reports: no symptoms


Respiratory:  Reports: no symptoms


Gastrointestinal/Abdominal:  Reports: abdominal pain, nausea, poor appetite


Genitourinary:  Reports: no symptoms


Neurologic/Psychiatric:  Reports: no symptoms


Endocrine:  Reports: no symptoms


Hematologic/Lymphatic:  Reports: no symptoms


Allergies:  


Coded Allergies:  


     AMPICILLIN (Verified  Allergy, Unknown, 8/3/18)


Uncoded Allergies:  


     PENICILLIN (Allergy, Unknown, 8/3/18)


All Systems:  reviewed and negative except above


Subjective


Events of overnight reviewed


Chart reviewed


HR improving


Abdominal pain persists- somewhat controlled with pain meds


+nausea no vomiting


No ERCP today secondary to equipment unavailability- will attempt for Monday


Patient upset- wants to "go back to [place]"





Objective





Last 24 Hour Vital Signs








  Date Time  Temp Pulse Resp B/P (MAP) Pulse Ox O2 Delivery O2 Flow Rate FiO2


 


8/31/18 18:20 98.2       


 


8/31/18 17:21 98.2       


 


8/31/18 17:20  128  117/71    


 


8/31/18 16:00 98.0 86 18 129/71 (90) 96   





 98.0       


 


8/31/18 16:00  90      


 


8/31/18 13:29  128  117/71    


 


8/31/18 12:00 98.1 128 18 117/71 (86) 95   





 98.1       


 


8/31/18 12:00  126      


 


8/31/18 09:00      Room Air  


 


8/31/18 08:58  110  110/60    


 


8/31/18 08:00  113      


 


8/31/18 08:00 98.2 132 18 112/61 (78) 95   





 98.2       


 


8/31/18 04:00  102      


 


8/31/18 04:00 97.6 115 20 134/83 (100) 93   





 97.6       


 


8/31/18 00:00 98.2 120 20 106/61 (76) 94   





 98.2       


 


8/31/18 00:00  165      


 


8/30/18 21:26  120  121/72    


 


8/30/18 21:00      Room Air  


 


8/30/18 20:00 98.4 87 18 121/72 (88) 92   





 98.4       


 


8/30/18 20:00  115      

















Intake and Output  


 


 8/30/18 8/31/18





 19:00 07:00


 


Intake Total  100 ml


 


Output Total 400 ml 275 ml


 


Balance -400 ml -175 ml


 


  


 


Intake Oral  100 ml


 


Output Urine Total 400 ml 275 ml


 


# Bowel Movements 1 








Laboratory Tests


8/31/18 09:50: 


Prothrombin Time 12.0H, Prothromb Time International Ratio 1.1, Sodium Level 136

, Potassium Level 4.1, Chloride Level 103, Carbon Dioxide Level 28, Anion Gap 6

, Blood Urea Nitrogen 23H, Creatinine 0.9, Estimat Glomerular Filtration Rate > 

60, Glucose Level 123H, Calcium Level 8.7, Total Bilirubin 1.1H, Direct 

Bilirubin 0.7H, Aspartate Amino Transf (AST/SGOT) 33, Alanine Aminotransferase (

ALT/SGPT) 41, Alkaline Phosphatase 131H, Total Protein 5.9L, Albumin 2.2L, 

Globulin 3.7, Albumin/Globulin Ratio 0.6L


8/31/18 09:55: Activated Partial Thromboplast Time 36H


Height (Feet):  5


Height (Inches):  9.00


Weight (Pounds):  150


General Appearance:  no apparent distress, alert


EENT:  PERRL/EOMI, normal ENT inspection, TMs normal, scleral icterus


Neck:  non-tender, supple


Cardiovascular:  normal peripheral pulses, regularly irregular, no JVD


Respiratory/Chest:  chest wall non-tender, no respiratory distress, decreased 

breath sounds


Abdomen:  normal bowel sounds, decreased bowel sounds, tender


Pelvis:  normal external exam


Genitourinary/Rectal:  normal genital exam


Extremities:  normal range of motion, non-tender


Edema:  no edema noted Arm (L), no edema noted Arm (R)


Edema:  trace edema


Neurologic:  CNs II-XII grossly normal, alert


Skin:  normal pigmentation, warm/dry


Lymphatic:  normal anterior cervical (L), normal anterior cervical (R)











Gino Stover MD Aug 31, 2018 19:54

## 2018-08-31 NOTE — CONSULTATION
Consult Note


Consult Note





 


DATE OF CONSULTATION:  08/30/18 1917





HEMATOLOGY/ONCOLOGY CONSULTATION





CONSULTING PHYSICIAN:  Jack Benitez M.D.





REQUESTING PHYSICIAN:  Gary Peter M.D. Jolanta JERONIMO





REASON FOR CONSULTATION:  Evaluation of pancytopenia.





IDENTIFYING DATA:  Dear Dr. Peter,





Thank you for this consultation.





The patient is a pleasant 66-year-old male i saw on prior admission.  He has a 

past medical history, which is significant for bradycardia, history of CHF, 

history of non-STEMI, and atrial fibrillation, at this time presents to the 

hospital.


He has been seen by Cardiology, presents with CHF exacerbation.  He is a [facility] patient.  He has been admitted under the care of Dr. Peter, seen by 

Dr. Alejo, presents with generalized weakness, shortness of breath, increased 

orthopnea, noted to have pancytopenia. Hematology/Oncology Service was 

consulted for further evaluation and treatment. Also noted to have coagulopathy 

and here for cholecytitis





PAST MEDICAL HISTORY:  CHF and COPD.





ALLERGIES:  Ampicillin.





MEDICATIONS:  Coreg, Lasix, Crestor.





SOCIAL HISTORY:  Lives in [facility].  He is an active smoker.





REVIEW OF SYSTEMS:  Otherwise negative besides as noted in the history of 

present illness.  The patient does have PND, cough, orthopnea,or weakness.





PHYSICAL EXAMINATION:


VITAL SIGNS:  Reviewed.


GENERAL:  No acute distress.


PULMONARY:  Decreased breath sounds.


CARDIOVASCULAR:  Regular rate and rhythm.  No M/G/R.


ABDOMEN:  Soft, nontender, and nondistended.


EXTREMITIES:  No cyanosis, swelling, or edema.

















Current Medications








 Medications


  (Trade)  Dose


 Ordered  Sig/Marissa


 Route


 PRN Reason  Start Time


 Stop Time Status Last Admin


Dose Admin


 


 Atorvastatin


 Calcium


  (Lipitor)  40 mg  BEDTIME


 ORAL


   8/29/18 21:00


 9/28/18 20:59  8/30/18 21:24


 


 


 Carvedilol


  (Coreg)  12.5 mg  EVERY 12  HOURS


 ORAL


   8/29/18 21:00


 9/28/18 20:59  8/30/18 21:26


 


 


 Ciprofloxacin  200 ml @ 


 200 mls/hr  Q12HR


 IV


   8/29/18 21:00


 9/5/18 20:59  8/31/18 08:58


 


 


 Diltiazem HCl


  (Cardizem)  10 mg  Q3H  PRN


 IV


 HR>130  8/30/18 00:45


 9/29/18 00:44  8/30/18 04:27


 


 


 Docusate Sodium


  (Colace)  100 mg  Q12HR


 ORAL


   8/29/18 21:00


 9/28/18 20:59  8/31/18 08:58


 


 


 Famotidine


  (Pepcid)  20 mg  TWICE A  DAY


 ORAL


   8/30/18 09:00


 9/29/18 08:59  8/31/18 08:58


 


 


 Hydralazine HCl


  (Apresoline)  10 mg  Q6H  PRN


 IV


 For High Blood Pressure  8/29/18 20:00


 9/28/18 19:59   


 


 


 Metronidazole  100 ml @ 


 100 mls/hr  Q8HR


 IVPB


   8/29/18 22:00


 9/5/18 21:59  8/31/18 05:54


 


 


 Morphine Sulfate


  (Morphine


 Sulfate)  2 mg  Q4H  PRN


 IVP


 Moderate Pain (Pain Scale 4-6)  8/29/18 20:00


 9/5/18 19:59  8/30/18 23:21


 


 


 Morphine Sulfate


  (Morphine


 Sulfate)  4 mg  Q4H  PRN


 IVP


 Severe Pain (Pain Scale 7-10)  8/29/18 20:00


 9/5/18 19:59  8/30/18 18:45


 


 


 Sodium Chloride  1,000 ml @ 


 100 mls/hr  Q10H


 IV


   8/29/18 20:18


 9/28/18 20:17  8/30/18 17:07


 


 


 Zolpidem Tartrate


  (Ambien)  5 mg  BEDTIME  PRN


 ORAL


 Insomnia  8/29/18 22:45


 9/5/18 22:44  8/29/18 22:47


 

















Assessment/Plan


1. Pancytopenia, potentially secondary to liver disease, bilirubin elevated on 

admission. The patient had transaminitis on admission as well. MCV is elevated.

  


--> Hepatitis A and B are negative, Hep-C is positive, HIV negative. 


--> Ultrasound of the abdomen reviewed. No evidence of cirrhosis.  


--> We will continue to closely monitor.  


--> Monitor platelet count closely.


--> obtain a bone marrow biopsy if in future counts are worse


2. Anemia of chronic disease. Anemia panel has been reviewed. Will trend CBC 

daily. 


--> Hemoglobin goal is above 7.  


3. Anemia due to underlying congestive heart failure, decompensated state.


4. Cholecytitits to have ercp performed


--> to see surgery as well as gi team


5. Possible viral urinary tract infection.


6. Transaminitis, hepatic congestion questionable.


7. Deep venous thrombosis prophylaxis, heparin subcutaneous.





Greatly appreciate consult!





The time the note was entered does not necessarily correspond to the time the 

patient was seen.











Jack Benitez MD Aug 31, 2018 09:09

## 2018-08-31 NOTE — CARDIOLOGY PROGRESS NOTE
Assessment/Plan


Assessment/Plan


perm afib now with rvr 


acute derrick


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 


hs of cm with subsequent resolution 


copd 


thrombocytopna 


cad s/p pci lad rca cedrs 3/2018 


chronic chf 


MR not confiormed on echo here 8/2018








no chf sx 


he has noted to have resolution of his cm in 6/2018[facility] confirmed on echo 

here tee;justynaithis month 


hr is nwo rapid despite ecoreg 12.5 bid 


hwe had recieved iv dilt in er to which he reportedly responded 


MR not confirmed on echo here ealier in august 


he had no sx with his tachy will add Cardizem po 





no sx o acs will repeat trop in light of tachy 


needs to be on ecotrin and plavix since his stents are almost 6mon ago 

additionally need anticoagulation for stroke prevention in afib 


however risk of bleeding with triple therapy will need to be on Ecotrin adn 

anticoagulation  





[id number]





Objective





Last 24 Hour Vital Signs








  Date Time  Temp Pulse Resp B/P (MAP) Pulse Ox O2 Delivery O2 Flow Rate FiO2


 


8/31/18 09:00      Room Air  


 


8/31/18 08:58  110  110/60    


 


8/31/18 08:00 98.2 132 18 112/61 (78) 95   





 98.2       


 


8/31/18 04:00  102      


 


8/31/18 04:00 97.6 115 20 134/83 (100) 93   





 97.6       


 


8/31/18 00:00 98.2 120 20 106/61 (76) 94   





 98.2       


 


8/31/18 00:00  165      


 


8/30/18 21:26  120  121/72    


 


8/30/18 21:00      Room Air  


 


8/30/18 20:00 98.4 87 18 121/72 (88) 92   





 98.4       


 


8/30/18 20:00  115      


 


8/30/18 19:15 98.1       


 


8/30/18 18:45 98.1       


 


8/30/18 16:00 98.1 109 18 92/53 (66) 94   





 98.1       


 


8/30/18 16:00  102      


 


8/30/18 14:51 98.4       

















Intake and Output  


 


 8/30/18 8/31/18





 19:00 07:00


 


Intake Total  100 ml


 


Output Total 400 ml 275 ml


 


Balance -400 ml -175 ml


 


  


 


Intake Oral  100 ml


 


Output Urine Total 400 ml 275 ml


 


# Bowel Movements 1 











Laboratory Tests








Test


  8/31/18


09:50


 


Prothrombin Time


  12.0 SEC


(9.30-11.50)  H


 


Prothromb Time International


Ratio 1.1 (0.9-1.1)  


 


 


Sodium Level


  136 MMOL/L


(136-145)


 


Potassium Level


  4.1 MMOL/L


(3.5-5.1)


 


Chloride Level


  103 MMOL/L


()


 


Carbon Dioxide Level


  28 MMOL/L


(21-32)


 


Anion Gap


  6 mmol/L


(5-15)


 


Blood Urea Nitrogen


  23 mg/dL


(7-18)  H


 


Creatinine


  0.9 MG/DL


(0.55-1.30)


 


Estimat Glomerular Filtration


Rate > 60 mL/min


(>60)


 


Glucose Level


  123 MG/DL


()  H


 


Calcium Level


  8.7 MG/DL


(8.5-10.1)


 


Total Bilirubin


  1.1 MG/DL


(0.2-1.0)  H


 


Direct Bilirubin


  0.7 MG/DL


(0.0-0.3)  H


 


Aspartate Amino Transf


(AST/SGOT) 33 U/L (15-37)


 


 


Alanine Aminotransferase


(ALT/SGPT) 41 U/L (12-78)


 


 


Alkaline Phosphatase


  131 U/L


()  H


 


Total Protein


  5.9 G/DL


(6.4-8.2)  L


 


Albumin


  2.2 G/DL


(3.4-5.0)  L


 


Globulin 3.7 g/dL  


 


Albumin/Globulin Ratio


  0.6 (1.0-2.7)


L











Microbiology








 Date/Time


Source Procedure


Growth Status


 


 


 8/29/18 17:40


Nasal Nares MRSA Culture - Final


NO METHICILLIN RESISTANT STAPH AUREUS... Complete





 8/29/18 17:40


Rectum VRE Culture - Final


NO VANCOMYCIN RESISTANT ENTEROCOCCUS ... Complete


 


 8/29/18 17:40


Rectum - Final


NO CARBAPENEM-RESISTANT ENTEROBACTERI... Complete

















Zach Fonseca MD Aug 31, 2018 13:23

## 2018-08-31 NOTE — GENERAL PROGRESS NOTE
Assessment/Plan


Status:  unchanged


Assessment/Plan


1. Pancytopenia, potentially secondary to liver disease, bilirubin elevated on 

admission. The patient had transaminitis on admission as well. MCV is elevated.

  


--> Hepatitis A and B are negative, Hep-C is positive, HIV negative. 


--> Ultrasound of the abdomen reviewed. No evidence of cirrhosis.  


--> We will continue to closely monitor.  


--> Monitor platelet count closely.


--> obtain a bone marrow biopsy if in future counts are worse


2. Anemia of chronic disease. Anemia panel has been reviewed. Will trend CBC 

daily. 


--> Hemoglobin goal is above 7.  


--> Current Hgb at 10.8, stable 


3. Anemia due to underlying congestive heart failure, decompensated state.


4. Cholecystitis to have ercp performed 8/31


--> to see surgery as well as gi team


5. Possible viral urinary tract infection.


6. Transaminitis, hepatic congestion questionable.


7. Deep venous thrombosis prophylaxis, heparin subcutaneous.








The time the note was entered does not necessarily correspond to the time the 

patient was seen.





Subjective


Date patient seen:  Aug 31, 2018


ROS Limited/Unobtainable:  Yes


Gastrointestinal/Abdominal:  Reports: abdominal pain


Hematologic/Lymphatic:  Reports: anemia


Allergies:  


Coded Allergies:  


     AMPICILLIN (Verified  Allergy, Unknown, 8/3/18)


Uncoded Allergies:  


     PENICILLIN (Allergy, Unknown, 8/3/18)


All Systems:  reviewed and negative except above


Subjective


Pt c/o RUQ abd pain. Scheduled for ECRP today.





Objective





Last 24 Hour Vital Signs








  Date Time  Temp Pulse Resp B/P (MAP) Pulse Ox O2 Delivery O2 Flow Rate FiO2


 


8/31/18 13:29  128  117/71    


 


8/31/18 09:00      Room Air  


 


8/31/18 08:58  110  110/60    


 


8/31/18 08:00 98.2 132 18 112/61 (78) 95   





 98.2       


 


8/31/18 04:00  102      


 


8/31/18 04:00 97.6 115 20 134/83 (100) 93   





 97.6       


 


8/31/18 00:00 98.2 120 20 106/61 (76) 94   





 98.2       


 


8/31/18 00:00  165      


 


8/30/18 21:26  120  121/72    


 


8/30/18 21:00      Room Air  


 


8/30/18 20:00 98.4 87 18 121/72 (88) 92   





 98.4       


 


8/30/18 20:00  115      


 


8/30/18 19:15 98.1       


 


8/30/18 18:45 98.1       

















Intake and Output  


 


 8/30/18 8/31/18





 19:00 07:00


 


Intake Total  100 ml


 


Output Total 400 ml 275 ml


 


Balance -400 ml -175 ml


 


  


 


Intake Oral  100 ml


 


Output Urine Total 400 ml 275 ml


 


# Bowel Movements 1 








Laboratory Tests


8/31/18 09:50: 


Prothrombin Time 12.0H, Prothromb Time International Ratio 1.1, Sodium Level 136

, Potassium Level 4.1, Chloride Level 103, Carbon Dioxide Level 28, Anion Gap 6

, Blood Urea Nitrogen 23H, Creatinine 0.9, Estimat Glomerular Filtration Rate > 

60, Glucose Level 123H, Calcium Level 8.7, Total Bilirubin 1.1H, Direct 

Bilirubin 0.7H, Aspartate Amino Transf (AST/SGOT) 33, Alanine Aminotransferase (

ALT/SGPT) 41, Alkaline Phosphatase 131H, Total Protein 5.9L, Albumin 2.2L, 

Globulin 3.7, Albumin/Globulin Ratio 0.6L


8/31/18 09:55: Activated Partial Thromboplast Time 36H


Height (Feet):  5


Height (Inches):  9.00


Weight (Pounds):  150


General Appearance:  no apparent distress


EENT:  PERRL/EOMI


Neck:  normal alignment


Cardiovascular:  tachycardia


Respiratory/Chest:  no respiratory distress


Abdomen:  tender











Jack Benitez MD Aug 31, 2018 16:24

## 2018-08-31 NOTE — ANETHESIA PREOPERATIVE EVAL
Anesthesia Pre-op PMH/ROS


General


Date of Evaluation:  Aug 31, 2018


Time of Evaluation:  14:28


Anesthesiologist:  Dilan


ASA Score:  ASA 3


Mallampati Score


Class I : Soft palate, uvula, fauces, pillars visible


Class II: Soft palate, uvula, fauces visible


Class III: Soft palate, base of uvula visible


Class IV: Only hard plate visible


Mallampati Classification:  Class II


Surgeon:  Kumar


Diagnosis:  Abd Pain


Surgical Procedure:  Laparoscopic Cholecystectomy


Anesthesia History:  none


Family History:  no anesthesia problems


Allergies:  


Coded Allergies:  


     AMPICILLIN (Verified  Allergy, Unknown, 8/3/18)


Uncoded Allergies:  


     PENICILLIN (Allergy, Unknown, 8/3/18)


Medications:  see eMAR





Past Medical History


Cardiovascular:  Reports: HTN, arrhythmia - Bradycardia, AFib, other - HL, CHF


Pulmonary:  Reports: asthma


Gastrointestinal/Genitourinary:  Reports: GERD





Anesthesia Pre-op Phys. Exam


Physician Exam





Last Vital Signs








  Date Time  Temp Pulse Resp B/P (MAP) Pulse Ox O2 Delivery O2 Flow Rate FiO2


 


8/31/18 13:29  128  117/71    


 


8/31/18 09:00      Room Air  


 


8/31/18 08:00 98.2  18  95   





 98.2       








Constitutional:  NAD


Neurologic:  CN 2-12 intact


Cardiovascular:  RRR


Respiratory:  CTA


Gastrointestinal:  S/NT/ND





Airway Exam


Mallampati Score:  Class II


MO:  full


ROM:  limited


Teeth:  missing, intact





Anesthesia Pre-op A/P


Labs





Coagulation








Test


  8/31/18


09:50 8/31/18


09:55


 


Prothrombin Time


  12.0 SEC


(9.30-11.50)  H 


 


 


Prothromb Time International


Ratio 1.1 (0.9-1.1)  


  


 


 


Activated Partial


Thromboplast Time 


  36 SEC (23-33)


H








Chemistry








Test


  8/31/18


09:50


 


Sodium Level


  136 MMOL/L


(136-145)


 


Potassium Level


  4.1 MMOL/L


(3.5-5.1)


 


Chloride Level


  103 MMOL/L


()


 


Carbon Dioxide Level


  28 MMOL/L


(21-32)


 


Anion Gap


  6 mmol/L


(5-15)


 


Blood Urea Nitrogen


  23 mg/dL


(7-18)  H


 


Creatinine


  0.9 MG/DL


(0.55-1.30)


 


Estimat Glomerular Filtration


Rate > 60 mL/min


(>60)


 


Glucose Level


  123 MG/DL


()  H


 


Calcium Level


  8.7 MG/DL


(8.5-10.1)


 


Total Bilirubin


  1.1 MG/DL


(0.2-1.0)  H


 


Direct Bilirubin


  0.7 MG/DL


(0.0-0.3)  H


 


Aspartate Amino Transf


(AST/SGOT) 33 U/L (15-37)


 


 


Alanine Aminotransferase


(ALT/SGPT) 41 U/L (12-78)


 


 


Alkaline Phosphatase


  131 U/L


()  H


 


Total Protein


  5.9 G/DL


(6.4-8.2)  L


 


Albumin


  2.2 G/DL


(3.4-5.0)  L


 


Globulin 3.7 g/dL  


 


Albumin/Globulin Ratio


  0.6 (1.0-2.7)


L











Risk Assessment & Plan


Assessment:


ASA 3


Plan:


GA


Status Change Before Surgery:  No





Pre-Antibiotics


Drug:  Yonas Stephens MD Aug 31, 2018 14:54

## 2018-08-31 NOTE — GENERAL SURGERY PROGRESS NOTE
General Surgery-Progress Note


Subjective


Additional Comments


no acute events.  still with RUQ abdominal pain.  no n/v/f/c.





Objective





Last 24 Hour Vital Signs








  Date Time  Temp Pulse Resp B/P (MAP) Pulse Ox O2 Delivery O2 Flow Rate FiO2


 


8/31/18 09:00      Room Air  


 


8/31/18 08:58  110  110/60    


 


8/31/18 08:00 98.2 132 18 112/61 (78) 95   





 98.2       


 


8/31/18 04:00  102      


 


8/31/18 04:00 97.6 115 20 134/83 (100) 93   





 97.6       


 


8/31/18 00:00 98.2 120 20 106/61 (76) 94   





 98.2       


 


8/31/18 00:00  165      


 


8/30/18 21:26  120  121/72    


 


8/30/18 21:00      Room Air  


 


8/30/18 20:00 98.4 87 18 121/72 (88) 92   





 98.4       


 


8/30/18 20:00  115      


 


8/30/18 19:15 98.1       


 


8/30/18 18:45 98.1       


 


8/30/18 16:00 98.1 109 18 92/53 (66) 94   





 98.1       


 


8/30/18 16:00  102      


 


8/30/18 14:51 98.4       


 


8/30/18 12:00 98.4 124 18 95/72 (80) 95   





 98.4       


 


8/30/18 12:00  121      








I&O











Intake and Output  


 


 8/30/18 8/31/18





 19:00 07:00


 


Intake Total  100 ml


 


Output Total 400 ml 275 ml


 


Balance -400 ml -175 ml


 


  


 


Intake Oral  100 ml


 


Output Urine Total 400 ml 275 ml


 


# Bowel Movements 1 








Cardiovascular:  RSR


Respiratory:  clear


Abdomen:  soft, tenderness, present bowel sounds


Extremities:  no cyanosis





Laboratory Tests








Test


  8/31/18


09:50


 


Prothrombin Time


  12.0 SEC


(9.30-11.50)  H


 


Prothromb Time International


Ratio 1.1 (0.9-1.1)  


 


 


Sodium Level Pending  


 


Potassium Level Pending  


 


Chloride Level Pending  


 


Carbon Dioxide Level Pending  


 


Blood Urea Nitrogen Pending  


 


Creatinine Pending  


 


Estimat Glomerular Filtration


Rate Pending  


 


 


Glucose Level Pending  


 


Calcium Level Pending  


 


Total Bilirubin Pending  


 


Aspartate Amino Transf


(AST/SGOT) Pending  


 


 


Alanine Aminotransferase


(ALT/SGPT) Pending  


 


 


Alkaline Phosphatase Pending  


 


Total Protein Pending  


 


Albumin Pending  


 


Globulin Pending  











Plan


Problems:  


(1) Choledocholithiasis


Assessment & Plan:  labs trending down.  


GI input appreciated


defer to GI for ERCP





(2) Abdominal pain


(3) Cholecystitis


Assessment & Plan:  will discuss lap derrick with patient


will need to first clear CBD


okay for diet from surgical standpoint


defer to GI for ERCP vs MRCP


will follow with recs.  thank you for this consultation. 














Timothy Heath Aug 31, 2018 11:31

## 2018-09-01 NOTE — GENERAL PROGRESS NOTE
Assessment/Plan


Problem List:  


(1) Choledocholithiasis


ICD Codes:  K80.50 - Calculus of bile duct without cholangitis or cholecystitis 

without obstruction


SNOMED:  956574122


(2) Cholecystitis


ICD Codes:  K81.9 - Cholecystitis, unspecified


SNOMED:  90622575


(3) Atrial fibrillation with RVR


ICD Codes:  I48.91 - Unspecified atrial fibrillation


SNOMED:  671532856074023


(4) COPD (chronic obstructive pulmonary disease)


ICD Codes:  J44.9 - Chronic obstructive pulmonary disease, unspecified


SNOMED:  71634086


(5) Episode of generalized weakness


ICD Codes:  R53.1 - Weakness


SNOMED:  14975994


(6) Abdominal pain


ICD Codes:  R10.9 - Unspecified abdominal pain


SNOMED:  36881966


Qualifiers:  


   Qualified Codes:  R10.11 - Right upper quadrant pain


Assessment/Plan


Needs ERCP


scope not available until Tuesday


patient stable


abx


advance diet>>on cardiac diet


fu labs


fu surg recs





Subjective


ROS Limited/Unobtainable:  Yes


Allergies:  


Coded Allergies:  


     AMPICILLIN (Verified  Allergy, Unknown, 8/3/18)


Uncoded Allergies:  


     PENICILLIN (Allergy, Unknown, 8/3/18)





Objective





Last 24 Hour Vital Signs








  Date Time  Temp Pulse Resp B/P (MAP) Pulse Ox O2 Delivery O2 Flow Rate FiO2


 


9/1/18 09:45 98.3       


 


9/1/18 09:15 98.3       


 


9/1/18 09:13  135  148/76    


 


9/1/18 09:12  135      


 


9/1/18 08:00 97.6 112 20 121/6 (44) 98   





 97.6       


 


9/1/18 04:00  92      


 


9/1/18 04:00 98.3 96 24 148/76 (100) 96   





 98.3       


 


9/1/18 00:00 98.0 80 21 128/71 (90) 94   





 98.0       


 


9/1/18 00:00  86      


 


8/31/18 21:21  87  128/71    


 


8/31/18 21:00      Room Air  


 


8/31/18 20:00 98.1 87 20 128/71 (90) 97   





 98.1       


 


8/31/18 20:00  84      


 


8/31/18 18:20 98.2       


 


8/31/18 17:21 98.2       


 


8/31/18 17:20  128  117/71    


 


8/31/18 16:00 98.0 86 18 129/71 (90) 96   





 98.0       


 


8/31/18 16:00  90      


 


8/31/18 13:29  128  117/71    


 


8/31/18 12:00 98.1 128 18 117/71 (86) 95   





 98.1       


 


8/31/18 12:00  126      

















Intake and Output  


 


 8/31/18 9/1/18





 19:00 07:00


 


Intake Total 240 ml 120 ml


 


Balance 240 ml 120 ml


 


  


 


Intake Oral 240 ml 120 ml


 


# Voids 3 5


 


# Bowel Movements  1








Laboratory Tests


8/31/18 20:50: Activated Partial Thromboplast Time 37H


9/1/18 03:45: 


Activated Partial Thromboplast Time 52H, White Blood Count 7.1, Red Blood Count 

3.92L, Hemoglobin 11.2L, Hematocrit 32.9L, Mean Corpuscular Volume 84, Mean 

Corpuscular Hemoglobin 28.5, Mean Corpuscular Hemoglobin Concent 34.0, Red Cell 

Distribution Width 12.9, Platelet Count 169, Mean Platelet Volume 7.0, 

Neutrophils (%) (Auto) 74.9, Lymphocytes (%) (Auto) 9.7L, Monocytes (%) (Auto) 

13.3H, Eosinophils (%) (Auto) 1.6, Basophils (%) (Auto) 0.5, Sodium Level 138, 

Potassium Level 3.6, Chloride Level 104, Carbon Dioxide Level 28, Anion Gap 7, 

Blood Urea Nitrogen 17, Creatinine 0.9, Estimat Glomerular Filtration Rate > 60

, Glucose Level 124H, Calcium Level 8.7, Total Bilirubin 0.8, Aspartate Amino 

Transf (AST/SGOT) 37, Alanine Aminotransferase (ALT/SGPT) 38, Alkaline 

Phosphatase 186H, Total Protein 6.3L, Albumin 2.3L, Globulin 4.0, Albumin/

Globulin Ratio 0.6L


9/1/18 10:45: Activated Partial Thromboplast Time [Pending]


Height (Feet):  5


Height (Inches):  9.00


Weight (Pounds):  150


General Appearance:  no apparent distress


EENT:  normal ENT inspection


Neck:  supple


Cardiovascular:  normal rate


Respiratory/Chest:  decreased breath sounds


Abdomen:  normal bowel sounds, non tender, soft


Extremities:  non-tender











Jose Akbar MD Sep 1, 2018 11:06

## 2018-09-01 NOTE — CARDIOLOGY PROGRESS NOTE
Assessment/Plan


Assessment/Plan


atrial fibrillation, rate is better controlled and at rest it is 70. He is on 

IV Heparin,


k was replaced and we need to follow results


the patient is agitated and wants to go home


d/w nurse and nurse in [facility]


will call PCP





Subjective


Subjective


The patient is sitting on his bed, reports feeling OK and wants to go home





Objective





Last 24 Hour Vital Signs








  Date Time  Temp Pulse Resp B/P (MAP) Pulse Ox O2 Delivery O2 Flow Rate FiO2


 


9/1/18 12:00  96      


 


9/1/18 12:00 97.6 105 22 120/63 (82) 98   





 97.6       


 


9/1/18 09:45 98.3       


 


9/1/18 09:15 98.3       


 


9/1/18 09:13  135  148/76    


 


9/1/18 09:12  135      


 


9/1/18 09:00      Room Air  


 


9/1/18 08:00 97.6 112 20 121/6 (44) 98   





 97.6       


 


9/1/18 08:00  142      


 


9/1/18 04:00  92      


 


9/1/18 04:00 98.3 96 24 148/76 (100) 96   





 98.3       


 


9/1/18 00:00 98.0 80 21 128/71 (90) 94   





 98.0       


 


9/1/18 00:00  86      


 


8/31/18 21:21  87  128/71    


 


8/31/18 21:00      Room Air  


 


8/31/18 20:00 98.1 87 20 128/71 (90) 97   





 98.1       


 


8/31/18 20:00  84      


 


8/31/18 18:20 98.2       


 


8/31/18 17:21 98.2       


 


8/31/18 17:20  128  117/71    


 


8/31/18 16:00 98.0 86 18 129/71 (90) 96   





 98.0       


 


8/31/18 16:00  90      








General Appearance:  agitated


EENT:  PERRL/EOMI


Neck:  non-tender


Rhythm:  Afib


Cardiovascular:  tachycardia


Respiratory/Chest:  crackles/rales


Abdomen:  normal bowel sounds


Extremities:  trace edema


Neurologic:  abnormal CN











Intake and Output  


 


 8/31/18 9/1/18





 19:00 07:00


 


Intake Total 240 ml 120 ml


 


Balance 240 ml 120 ml


 


  


 


Intake Oral 240 ml 120 ml


 


# Voids 3 5


 


# Bowel Movements  1











Laboratory Tests








Test


  8/31/18


20:50 9/1/18


03:45 9/1/18


10:45


 


Activated Partial


Thromboplast Time 37 SEC (23-33)


H 52 SEC (23-33)


H 51 SEC (23-33)


H


 


White Blood Count


  


  7.1 K/UL


(4.8-10.8) 


 


 


Red Blood Count


  


  3.92 M/UL


(4.70-6.10)  L 


 


 


Hemoglobin


  


  11.2 G/DL


(14.2-18.0)  L 


 


 


Hematocrit


  


  32.9 %


(42.0-52.0)  L 


 


 


Mean Corpuscular Volume  84 FL (80-99)   


 


Mean Corpuscular Hemoglobin


  


  28.5 PG


(27.0-31.0) 


 


 


Mean Corpuscular Hemoglobin


Concent 


  34.0 G/DL


(32.0-36.0) 


 


 


Red Cell Distribution Width


  


  12.9 %


(11.6-14.8) 


 


 


Platelet Count


  


  169 K/UL


(150-450) 


 


 


Mean Platelet Volume


  


  7.0 FL


(6.5-10.1) 


 


 


Neutrophils (%) (Auto)


  


  74.9 %


(45.0-75.0) 


 


 


Lymphocytes (%) (Auto)


  


  9.7 %


(20.0-45.0)  L 


 


 


Monocytes (%) (Auto)


  


  13.3 %


(1.0-10.0)  H 


 


 


Eosinophils (%) (Auto)


  


  1.6 %


(0.0-3.0) 


 


 


Basophils (%) (Auto)


  


  0.5 %


(0.0-2.0) 


 


 


Sodium Level


  


  138 MMOL/L


(136-145) 


 


 


Potassium Level


  


  3.6 MMOL/L


(3.5-5.1) 


 


 


Chloride Level


  


  104 MMOL/L


() 


 


 


Carbon Dioxide Level


  


  28 MMOL/L


(21-32) 


 


 


Anion Gap


  


  7 mmol/L


(5-15) 


 


 


Blood Urea Nitrogen


  


  17 mg/dL


(7-18) 


 


 


Creatinine


  


  0.9 MG/DL


(0.55-1.30) 


 


 


Estimat Glomerular Filtration


Rate 


  > 60 mL/min


(>60) 


 


 


Glucose Level


  


  124 MG/DL


()  H 


 


 


Calcium Level


  


  8.7 MG/DL


(8.5-10.1) 


 


 


Total Bilirubin


  


  0.8 MG/DL


(0.2-1.0) 


 


 


Aspartate Amino Transf


(AST/SGOT) 


  37 U/L (15-37)


  


 


 


Alanine Aminotransferase


(ALT/SGPT) 


  38 U/L (12-78)


  


 


 


Alkaline Phosphatase


  


  186 U/L


()  H 


 


 


Total Protein


  


  6.3 G/DL


(6.4-8.2)  L 


 


 


Albumin


  


  2.3 G/DL


(3.4-5.0)  L 


 


 


Globulin  4.0 g/dL   


 


Albumin/Globulin Ratio


  


  0.6 (1.0-2.7)


L 


 











Microbiology








 Date/Time


Source Procedure


Growth Status


 


 


 8/29/18 17:40


Nasal Nares MRSA Culture - Final


NO METHICILLIN RESISTANT STAPH AUREUS... Complete





 8/29/18 17:40


Rectum VRE Culture - Final


NO VANCOMYCIN RESISTANT ENTEROCOCCUS ... Complete


 


 8/29/18 17:40


Rectum - Final


NO CARBAPENEM-RESISTANT ENTEROBACTERI... Complete

















Celsa Estrella MD Sep 1, 2018 15:36

## 2018-09-01 NOTE — PHYSICIAN QUERY
--------- THIS DOCUMENT IS A PERMANENT PART OF THE MEDICAL RECORD ---------

*****PLEASE COMPLETE THE DOCUMENT BEFORE SIGNING*****



Dear Dr. Stover                                                         Date: 09
/01/2018

/CDS Name: Bo Cao                              / CDS Phone # 
[phone]



Exercise your independent professional judgment when responding to query. 
Question asked do not imply a particular answer is desired/expected



Clinical Documentation States: "Acute Kidney Injury" documented in H&P and 
progress notes.



Clinical Findings Show: Creatinine: 08/29/2018: 2.0

                                                            08/30/2018: 1.4



Can you please Clarify the type of renal failure below:



        [ ] ARF w/ Tubular Necrosis   

               [ ] ARF w/ Cortical Necrosis   

               [ ] ARF w/ Medullary Necrosis      

               [ ] Acute Renal Failure (unspecified)

               [ ] Other: ______________________

               [ ] Clinically undeterminable



Please also document in your Progress Notes and/or Discharge Summary and 
indicate if the condition was present on admission.





_______________

ALO QUIÑONES

## 2018-09-01 NOTE — CONSULTATION
DATE OF CONSULTATION:  08/31/2018



NOTE:  POOR AUDIO



CARDIOLOGY CONSULTATION



CONSULTING PHYSICIAN:  Zach Fonseca M.D.



REFERRING PHYSICIAN:  Dr. Stover.



REASON FOR REFERRAL:  Tachycardia.



HISTORY OF PRESENT ILLNESS:  This is an elderly gentleman, who is known to

me from prior evaluation and hospitalization not too long ago, in fact, he

has had an issue with atrial fibrillation that is permanent.  The patient

was hospitalized here and was subsequently discharged now, admitted

because of abdominal pain and been diagnosed with cholecystitis requiring

surgery, however, he has had atrial fibrillation with rapid ventricular

response.  He received some diltiazem with some beta-blockers, which he

has received some beta-blockers previously and in the emergency room, he

received some diltiazem with good response, but he has got rapid

tachycardia.  This consultation is requested.  The patient requires ERCP

_____ Tuesday.  He does not have any chest pain or shortness of breath.

There is no PND or orthopnea.  Denies any palpitations.  Denies any

dizziness or lightheadedness on standing.



PAST MEDICAL HISTORY:  Positive for most of the issues.  He usually gets

treated at [facility], but he has apparently had a history of atrial

fibrillation, chronic  _____ compensated chronic congestive heart failure,

previously ejection fraction down to 15%, reportedly improved to 60% even

at last hospitalization here.  Echocardiogram was repeated and he has had

_____ systolic function.  He has had history of non-ST elevation

myocardial infarction, history of coronary artery disease, status post PCI

to the LAD and right coronary artery in March 2018, systemic hypertension,

pulmonary hypertension, history of biventricular systolic dysfunction,

moderate-to-severe mitral regurgitation, although that was not confirmed

on his echocardiogram that was done here.  He has had history of tobacco

use disorder as well as COPD, history of syncope, fatty liver, and also

hepatitis C as well as macular degeneration.



ALLERGIES:  He is allergic to penicillin.



SOCIAL HISTORY:  He smoked until recently.  Denies alcohol or drug use.



REVIEW OF SYSTEMS:  GASTROINTESTINAL:  He had some abdominal pain, although

at the present time he denies any.  No nausea, vomiting, or diarrhea.  He

does have some constipation.  GENITOURINARY:  Negative.  PULMONARY:

Negative.  CONSTITUTIONAL:  Negative.  NEUROLOGIC:  Negative.



PHYSICAL EXAMINATION:

GENERAL:  Shows to be elderly gentleman, in no respiratory distress.  He is

actually actively sitting up in the bed and eating his lunch.  He looks

quite normal.

NECK:  Supple.  No jugular venous distention.

LUNGS:  Clear to auscultation and percussion.

CARDIAC:  S1 is normal.  S2 is normal.  Irregularly irregular.

Tachycardic.  No heaves or thrills noted.

ABDOMEN:  Soft and nontender.  Positive bowel sounds.

EXTREMITIES:  There is no clubbing, cyanosis, or edema.

NEUROLOGICAL:  He is awake, alert, responsive, and in no apparent

respiratory distress.



LABORATORY AND DIAGNOSTIC DATA:  White count of 8.2 down from 11.1,

hemoglobin 10.8 down from 13.2, and a platelet count of 117,000.  His

sodium is 136, potassium 4.1, chloride 103, bicarb 28, BUN of 23,

creatinine 0.9, down from 2.0, and a glucose of 123.  Ferritin 475.

Bilirubin of 1.1.  Alkaline phosphatase 131 down from 165.  First set of

cardiac enzymes 2 days ago was negative at 0.00, subsequently that was not

repeated.  Albumin of 5.9 and globulin is 0.02.  Coags, INR is 1.1.   A

chest x-ray performed in the emergency room shows no acute findings.  He

did have a CT scan of his chest, abdomen, and pelvis in the emergency room

that showed cholelithiasis with mild ductal dilatation.  Evaluation with

ERCP is recommended.  Cholelithiasis with mild ill-defined gallbladder

wall and enhancement suggestive of cholecystitis.  He has had abdominal

ultrasound that showed acute cholecystitis suspected.  He had a HIDA scan

that was performed back in early August.



ASSESSMENT:

1. Permanent atrial fibrillation now with rapid ventricular response.

2. Acute cholecystitis with bradycardia with history of bifascicular

block.

3. History of cardiomyopathy with subsequent resolution.

4. Chronic obstructive pulmonary disease.

5. History of thrombocytopenia.

6. Coronary artery disease, status post PCI to the LAD and RCA in March 2018.

7. Chronic congestive heart failure.

8. History of mitral regurgitation, not confirmed on echo here in August 2018.



PLAN:  Dr. Stover, this patient was seen in cardiac consultation.  The

patient has no signs of congestive heart failure.  No symptoms of coronary

syndrome despite having rapid heart rate.  He was noted to have resolution

of his cardiomyopathy back in June 2018 at [facility]' echo and confirmed on

his echocardiogram earlier this month.  His heart rate is now rapid

despite Coreg at 12.5 b.i.d.  He had received some IV diltiazem in the

emergency room, to which he did respond.  In light of the fact that he is

having tachycardia despite the Coreg at a higher dose, I would consider

adding Cardizem _____ mg 4 times a day to see if it responds.  His cardiac

enzymes will be checked as a matter of routine.  His aspirin, Plavix,

_____ placed approximately six months ago; however, he also needs

anticoagulation for stroke prevention and atrial fibrillation.  In this

situation because of risk of bleeding with triple therapy, double therapy

will be recommended with a combination of aspirin and anticoagulation.

So, he should be started on that if okay.









  ______________________________________________

  Zach Fonseca M.D.





DR:  ALYSON

D:  08/31/2018 13:27

T:  09/01/2018 00:14

JOB#:  [id number]

CC:

## 2018-09-01 NOTE — GENERAL PROGRESS NOTE
Assessment/Plan


Status:  stable


Assessment/Plan


1) Acute cholecystitis


Choledocholithiasis


r/o Sepsis from a biliary source


-Surgery consulted- recs appreciated


-elevated bilirubin/transaminitis- GI consulted- recs appreciated


-continue IV antibiotics- ciprofloxacin, metronidazole


-check cultures- blood, urine, lactic acid- negative to date


-cardiac diet as tolerated


-serial abd exams


-ERCP planned for Tuesday 9/4





2) Chronic systolic and diastolic heart failure


3) Atrial fibrillation with rapid ventricular response


4) CAD s/p PTCA/stent 6 months ago


-Telemetry monitoring


-continue carvedilol, hydralazine, statin


-hold apixaban given surgery


-Cardiology consulted- recs appreciated- start diltiazem


-Heparin drip for Afib


-ASA given recent stent





5) DARIAN- improving near baseline


-IVF's; likely hypovolemic; doubt sepsis


-avoid nephrotoxins/renally dose meds


-will follow closely





5) Pancytopenia


-Hematology recs appreciated


-monitor for now





6) Agitation, confusion - pt asking to leave AMA, questionable capacity


-Psych consulted for assistance





DVT Prophylaxis: scd's, IV heparin





Code status: full





Hospital Classification declaration: Based on this initial evaluation, and 

depending on the patient's clinical course, I anticipate that this patient will 

require hospitalization for 2-3 days. 





Disposition: Once the patient is stable to leave the hospital, I anticipate the 

patient will likely be discharged to the following environment: SNF- comes from 

[facility]





I spent 40 minutes on this patient's case, and 23 minutes was dedicated to 

counseling and/or care coordination. 





Time of note may not reflect time of encounter.





Subjective


Date patient seen:  Sep 1, 2018


Time patient seen:  09:26


ROS Limited/Unobtainable:  No


Allergies:  


Coded Allergies:  


     AMPICILLIN (Verified  Allergy, Unknown, 8/3/18)


Uncoded Allergies:  


     PENICILLIN (Allergy, Unknown, 8/3/18)


Subjective


No acute o/n events


Awaiting ERCP on tues





Pt states abd pain improved. Tolerating diet. Passing flatus. 


Pt asking to leave hospital and go back to [place] where he is from


Per RN, pt noted to be agitated, some confusion noted





Objective





Last 24 Hour Vital Signs








  Date Time  Temp Pulse Resp B/P (MAP) Pulse Ox O2 Delivery O2 Flow Rate FiO2


 


9/1/18 09:15 98.3       


 


9/1/18 09:13  135  148/76    


 


9/1/18 09:12  135      


 


9/1/18 04:00  92      


 


9/1/18 04:00 98.3 96 24 148/76 (100) 96   





 98.3       


 


9/1/18 00:00 98.0 80 21 128/71 (90) 94   





 98.0       


 


9/1/18 00:00  86      


 


8/31/18 21:21  87  128/71    


 


8/31/18 21:00      Room Air  


 


8/31/18 20:00 98.1 87 20 128/71 (90) 97   





 98.1       


 


8/31/18 20:00  84      


 


8/31/18 18:20 98.2       


 


8/31/18 17:21 98.2       


 


8/31/18 17:20  128  117/71    


 


8/31/18 16:00 98.0 86 18 129/71 (90) 96   





 98.0       


 


8/31/18 16:00  90      


 


8/31/18 13:29  128  117/71    


 


8/31/18 12:00 98.1 128 18 117/71 (86) 95   





 98.1       


 


8/31/18 12:00  126      

















Intake and Output  


 


 8/31/18 9/1/18





 19:00 07:00


 


Intake Total 240 ml 120 ml


 


Balance 240 ml 120 ml


 


  


 


Intake Oral 240 ml 120 ml


 


# Voids 3 5


 


# Bowel Movements  1








Laboratory Tests


8/31/18 09:50: 


Prothrombin Time 12.0H, Prothromb Time International Ratio 1.1, Sodium Level 136

, Potassium Level 4.1, Chloride Level 103, Carbon Dioxide Level 28, Anion Gap 6

, Blood Urea Nitrogen 23H, Creatinine 0.9, Estimat Glomerular Filtration Rate > 

60, Glucose Level 123H, Calcium Level 8.7, Total Bilirubin 1.1H, Direct 

Bilirubin 0.7H, Aspartate Amino Transf (AST/SGOT) 33, Alanine Aminotransferase (

ALT/SGPT) 41, Alkaline Phosphatase 131H, Total Protein 5.9L, Albumin 2.2L, 

Globulin 3.7, Albumin/Globulin Ratio 0.6L


8/31/18 09:55: Activated Partial Thromboplast Time 36H


8/31/18 20:50: Activated Partial Thromboplast Time 37H


9/1/18 03:45: 


Sodium Level 138, Potassium Level 3.6, Chloride Level 104, Carbon Dioxide Level 

28, Anion Gap 7, Blood Urea Nitrogen 17, Creatinine 0.9, Estimat Glomerular 

Filtration Rate > 60, Glucose Level 124H, Calcium Level 8.7, Total Bilirubin 0.8

, Aspartate Amino Transf (AST/SGOT) 37, Alanine Aminotransferase (ALT/SGPT) 38, 

Alkaline Phosphatase 186H, Total Protein 6.3L, Albumin 2.3L, Globulin 4.0, 

Albumin/Globulin Ratio 0.6L, Activated Partial Thromboplast Time 52H, White 

Blood Count 7.1, Red Blood Count 3.92L, Hemoglobin 11.2L, Hematocrit 32.9L, 

Mean Corpuscular Volume 84, Mean Corpuscular Hemoglobin 28.5, Mean Corpuscular 

Hemoglobin Concent 34.0, Red Cell Distribution Width 12.9, Platelet Count 169, 

Mean Platelet Volume 7.0, Neutrophils (%) (Auto) 74.9, Lymphocytes (%) (Auto) 

9.7L, Monocytes (%) (Auto) 13.3H, Eosinophils (%) (Auto) 1.6, Basophils (%) (

Auto) 0.5


Height (Feet):  5


Height (Inches):  9.00


Weight (Pounds):  150


Objective


General: alert, cooperative, no distress, appears stated age


Head: normocephalic, without obvious abnormality, atraumatic


Eyes: conjunctivae/corneas clear. PERRL, EOM's intact


Throat: lips, mucosa, and tongue normal. MMM


Neck: supple, symmetrical, trachea midline, and no JVD


Lungs: clear to auscultation bilaterally


Heart: regular rate and rhythm, S1, S2 normal, no murmur, click, rub or gallop


Abdomen: soft, non-tender, non-distended, bowel sounds normal; no masses or 

organomegaly


Extremities: extremities normal, atraumatic, no cyanosis or edema


Pulses: 2+ and symmetric


Skin: skin color, texture, turgor normal; no rashes or lesions


Neurologic: grossly normal, no focal deficits











Viri Trent M.D. Sep 1, 2018 09:26

## 2018-09-01 NOTE — GENERAL PROGRESS NOTE
Assessment/Plan


Status:  stable


Assessment/Plan


1. Pancytopenia, potentially secondary to liver disease, bilirubin elevated on 

admission. The patient had transaminitis on admission as well. MCV is elevated.

  


--> Hepatitis A and B are negative, Hep-C is positive, HIV negative. 


--> Ultrasound of the abdomen reviewed. No evidence of cirrhosis.  


--> We will continue to closely monitor.  


--> Monitor platelet count closely.


--> obtain a bone marrow biopsy if in future counts are worse


2. Anemia of chronic disease. Anemia panel has been reviewed. Will trend CBC 

daily. 


--> Hemoglobin goal is above 7.  


--> Current Hgb at 11.2, stable 


3. Anemia due to underlying congestive heart failure, decompensated state.


4. Cholecystitis to have ercp performed 8/31


--> to see surgery as well as gi team


5. Possible viral urinary tract infection.


6. Transaminitis, hepatic congestion questionable.


7. Deep venous thrombosis prophylaxis, heparin subcutaneous.








The time the note was entered does not necessarily correspond to the time the 

patient was seen.





Subjective


Date patient seen:  Sep 1, 2018


ROS Limited/Unobtainable:  Yes


Hematologic/Lymphatic:  Reports: anemia


Allergies:  


Coded Allergies:  


     AMPICILLIN (Verified  Allergy, Unknown, 8/3/18)


Uncoded Allergies:  


     PENICILLIN (Allergy, Unknown, 8/3/18)


All Systems:  reviewed and negative except above


Subjective


Pt agitated and wants to go home. No acute events.





Objective





Last 24 Hour Vital Signs








  Date Time  Temp Pulse Resp B/P (MAP) Pulse Ox O2 Delivery O2 Flow Rate FiO2


 


9/1/18 16:17 97.6 105 22 120/63 (82) 98   





 97.6       


 


9/1/18 16:00  92      


 


9/1/18 12:00  96      


 


9/1/18 12:00 97.6 105 22 120/63 (82) 98   





 97.6       


 


9/1/18 09:45 98.3       


 


9/1/18 09:15 98.3       


 


9/1/18 09:13  135  148/76    


 


9/1/18 09:12  135      


 


9/1/18 09:00      Room Air  


 


9/1/18 08:00 97.6 112 20 121/6 (44) 98   





 97.6       


 


9/1/18 08:00  142      


 


9/1/18 04:00  92      


 


9/1/18 04:00 98.3 96 24 148/76 (100) 96   





 98.3       


 


9/1/18 00:00 98.0 80 21 128/71 (90) 94   





 98.0       


 


9/1/18 00:00  86      


 


8/31/18 21:21  87  128/71    


 


8/31/18 21:00      Room Air  


 


8/31/18 20:00 98.1 87 20 128/71 (90) 97   





 98.1       


 


8/31/18 20:00  84      


 


8/31/18 18:20 98.2       

















Intake and Output  


 


 8/31/18 9/1/18





 19:00 07:00


 


Intake Total 240 ml 120 ml


 


Balance 240 ml 120 ml


 


  


 


Intake Oral 240 ml 120 ml


 


# Voids 3 5


 


# Bowel Movements  1








Laboratory Tests


8/31/18 20:50: Activated Partial Thromboplast Time 37H


9/1/18 03:45: 


Activated Partial Thromboplast Time 52H, White Blood Count 7.1, Red Blood Count 

3.92L, Hemoglobin 11.2L, Hematocrit 32.9L, Mean Corpuscular Volume 84, Mean 

Corpuscular Hemoglobin 28.5, Mean Corpuscular Hemoglobin Concent 34.0, Red Cell 

Distribution Width 12.9, Platelet Count 169, Mean Platelet Volume 7.0, 

Neutrophils (%) (Auto) 74.9, Lymphocytes (%) (Auto) 9.7L, Monocytes (%) (Auto) 

13.3H, Eosinophils (%) (Auto) 1.6, Basophils (%) (Auto) 0.5, Sodium Level 138, 

Potassium Level 3.6, Chloride Level 104, Carbon Dioxide Level 28, Anion Gap 7, 

Blood Urea Nitrogen 17, Creatinine 0.9, Estimat Glomerular Filtration Rate > 60

, Glucose Level 124H, Calcium Level 8.7, Total Bilirubin 0.8, Aspartate Amino 

Transf (AST/SGOT) 37, Alanine Aminotransferase (ALT/SGPT) 38, Alkaline 

Phosphatase 186H, Total Protein 6.3L, Albumin 2.3L, Globulin 4.0, Albumin/

Globulin Ratio 0.6L


9/1/18 10:45: Activated Partial Thromboplast Time 51H


Height (Feet):  5


Height (Inches):  9.00


Weight (Pounds):  150


General Appearance:  no apparent distress, alert


EENT:  PERRL/EOMI


Neck:  normal alignment


Cardiovascular:  tachycardia


Respiratory/Chest:  no respiratory distress


Abdomen:  soft











Jack Benitez MD Sep 1, 2018 17:43

## 2018-09-01 NOTE — CONSULTATION
History of Present Illness


General


Date patient seen:  Sep 1, 2018


Chief Complaint:  Abdominal Pain


Reason for Consultation:  cholecystitis





Present Illness


HPI


66 year old male with multiple medical comorbidities including COPD and CHF, 

who presented with abdominal pain. the pt has been agitated and yelling and is 

unable to be engaged and answer the questions appropriately


Allergies:  


Coded Allergies:  


     AMPICILLIN (Verified  Allergy, Unknown, 8/3/18)


Uncoded Allergies:  


     PENICILLIN (Allergy, Unknown, 8/3/18)





Medication History


Scheduled


Apixaban (Eliquis), 5 MG PO TWICE A DAY, (Reported)


Aspirin Ec* (Aspirin Ec*), 81 MG ORAL DAILY, (Reported)


Atorvastatin Calcium* (Atorvastatin Calcium*), 40 MG ORAL BEDTIME, (Reported)


Carvedilol (Coreg), 12.5 MG ORAL EVERY 12 HOURS, (Reported)


Carvedilol (Coreg), 12.5 MG ORAL EVERY 12 HOURS, (Reported)


Clopidogrel Bisulfate* (Plavix*), 75 MG ORAL DAILY, (Reported)


Docusate Sodium* (Docusate Sodium*), 100 MG ORAL Q12HR, (Reported)


Famotidine (Famotidine), 20 MG ORAL TWICE A DAY, (Reported)


Furosemide (Furosemide), 40 MG PO DAILY, (Reported)


Ipratropium/Albuterol Sulfate (DuoNeb 0.5-3(2.5)mg/3ml), 3 ML HHN Q4HR, (

Reported)


Prednisone* (Prednisone*), 30 MG ORAL DAILY, (Reported)


Prednisone* (Prednisone*), 30 MG ORAL DAILY, (Reported)





Scheduled PRN


Acetaminophen* (Acetaminophen 325MG Tablet*), 650 MG ORAL Q4H PRN for Mild Pain/

Temp > 100.5, (Reported)


Doxycycline Hyclate* (Vibramycin*), 100 MG ORAL EVERY 12 HOURS PRN for x3 days, 

(Reported)


Polyethylene Glycol 3350* (Miralax*), 17 GM ORAL Q12HR PRN for Constipation, (

Reported)


Tramadol Hcl* (Ultram*), 50 MG ORAL Q6H PRN for For Pain


Zolpidem Tartrate* (Ambien*), 5 MG ORAL BEDTIME PRN for Insomnia, (Reported)





Patient History


Limited by:  medical condition


History Provided By:  Patient, Medical Record, PMD


Healthcare decision maker


N


Resuscitation status


Full Code


Advanced Directive on File








Past Medical/Surgical History


Past Medical/Surgical History:  


(1) Choledocholithiasis


(2) Acute renal failure


(3) Cholecystitis


(4) Atrial fibrillation with RVR


(5) COPD (chronic obstructive pulmonary disease)


(6) Pneumonia


(7) Episode of generalized weakness


(8) Acute systolic CHF (congestive heart failure)


(9) Abdominal pain





Review of Systems


Psychiatric:  Reports: prior hx, anxiety, depressed feelings, emotional problems

, hallucinations





Physical Exam


General Appearance:  no apparent distress, alert, agitated





Last 24 Hour Vital Signs








  Date Time  Temp Pulse Resp B/P (MAP) Pulse Ox O2 Delivery O2 Flow Rate FiO2


 


9/1/18 21:11  94  138/89    


 


9/1/18 21:00      Room Air  


 


9/1/18 20:00 97.7 103 26 138/89 (105) 96   





 97.7       


 


9/1/18 20:00  118      


 


9/1/18 18:17  105  120/63    


 


9/1/18 16:17 97.6 105 22 120/63 (82) 98   





 97.6       


 


9/1/18 16:00  92      


 


9/1/18 12:00  96      


 


9/1/18 12:00 97.6 105 22 120/63 (82) 98   





 97.6       


 


9/1/18 09:45 98.3       


 


9/1/18 09:15 98.3       


 


9/1/18 09:13  135  148/76    


 


9/1/18 09:12  135      


 


9/1/18 09:00      Room Air  


 


9/1/18 08:00 97.6 112 20 121/6 (44) 98   





 97.6       


 


9/1/18 08:00  142      


 


9/1/18 04:00  92      


 


9/1/18 04:00 98.3 96 24 148/76 (100) 96   





 98.3       


 


9/1/18 00:00 98.0 80 21 128/71 (90) 94   





 98.0       


 


9/1/18 00:00  86      

















Intake and Output  


 


 8/31/18 9/1/18





 19:00 07:00


 


Intake Total 240 ml 120 ml


 


Balance 240 ml 120 ml


 


  


 


Intake Oral 240 ml 120 ml


 


# Voids 3 5


 


# Bowel Movements  1











Laboratory Tests








Test


  9/1/18


03:45 9/1/18


10:45 9/1/18


18:05


 


White Blood Count


  7.1 K/UL


(4.8-10.8) 


  


 


 


Red Blood Count


  3.92 M/UL


(4.70-6.10)  L 


  


 


 


Hemoglobin


  11.2 G/DL


(14.2-18.0)  L 


  


 


 


Hematocrit


  32.9 %


(42.0-52.0)  L 


  


 


 


Mean Corpuscular Volume 84 FL (80-99)    


 


Mean Corpuscular Hemoglobin


  28.5 PG


(27.0-31.0) 


  


 


 


Mean Corpuscular Hemoglobin


Concent 34.0 G/DL


(32.0-36.0) 


  


 


 


Red Cell Distribution Width


  12.9 %


(11.6-14.8) 


  


 


 


Platelet Count


  169 K/UL


(150-450) 


  


 


 


Mean Platelet Volume


  7.0 FL


(6.5-10.1) 


  


 


 


Neutrophils (%) (Auto)


  74.9 %


(45.0-75.0) 


  


 


 


Lymphocytes (%) (Auto)


  9.7 %


(20.0-45.0)  L 


  


 


 


Monocytes (%) (Auto)


  13.3 %


(1.0-10.0)  H 


  


 


 


Eosinophils (%) (Auto)


  1.6 %


(0.0-3.0) 


  


 


 


Basophils (%) (Auto)


  0.5 %


(0.0-2.0) 


  


 


 


Activated Partial


Thromboplast Time 52 SEC (23-33)


H 51 SEC (23-33)


H 68 SEC (23-33)


H


 


Sodium Level


  138 MMOL/L


(136-145) 


  


 


 


Potassium Level


  3.6 MMOL/L


(3.5-5.1) 


  


 


 


Chloride Level


  104 MMOL/L


() 


  


 


 


Carbon Dioxide Level


  28 MMOL/L


(21-32) 


  


 


 


Anion Gap


  7 mmol/L


(5-15) 


  


 


 


Blood Urea Nitrogen


  17 mg/dL


(7-18) 


  


 


 


Creatinine


  0.9 MG/DL


(0.55-1.30) 


  


 


 


Estimat Glomerular Filtration


Rate > 60 mL/min


(>60) 


  


 


 


Glucose Level


  124 MG/DL


()  H 


  


 


 


Calcium Level


  8.7 MG/DL


(8.5-10.1) 


  


 


 


Total Bilirubin


  0.8 MG/DL


(0.2-1.0) 


  


 


 


Aspartate Amino Transf


(AST/SGOT) 37 U/L (15-37)


  


  


 


 


Alanine Aminotransferase


(ALT/SGPT) 38 U/L (12-78)


  


  


 


 


Alkaline Phosphatase


  186 U/L


()  H 


  


 


 


Total Protein


  6.3 G/DL


(6.4-8.2)  L 


  


 


 


Albumin


  2.3 G/DL


(3.4-5.0)  L 


  


 


 


Globulin 4.0 g/dL    


 


Albumin/Globulin Ratio


  0.6 (1.0-2.7)


L 


  


 








Height (Feet):  5


Height (Inches):  9.00


Weight (Pounds):  150


Medications





Current Medications








 Medications


  (Trade)  Dose


 Ordered  Sig/Marissa


 Route


 PRN Reason  Start Time


 Stop Time Status Last Admin


Dose Admin


 


 Acetaminophen


  (Tylenol)  650 mg  Q6H  PRN


 ORAL


 Mild Pain/Temp > 100.5  8/31/18 15:15


 9/30/18 15:14  9/1/18 22:51


 


 


 Aspirin


  (ASA)  81 mg  DAILY


 ORAL


   9/1/18 09:00


 10/1/18 08:59  9/1/18 09:13


 


 


 Atorvastatin


 Calcium


  (Lipitor)  40 mg  BEDTIME


 ORAL


   8/29/18 21:00


 9/28/18 20:59  9/1/18 21:10


 


 


 Carvedilol


  (Coreg)  12.5 mg  EVERY 12  HOURS


 ORAL


   8/29/18 21:00


 9/28/18 20:59  9/1/18 21:11


 


 


 Ciprofloxacin  200 ml @ 


 200 mls/hr  Q12HR


 IV


   8/29/18 21:00


 9/5/18 20:59  9/1/18 21:11


 


 


 Diltiazem HCl


  (Cardizem)  10 mg  Q3H  PRN


 IV


 HR>130  8/30/18 00:45


 9/29/18 00:44  8/30/18 04:27


 


 


 Diltiazem HCl


  (Cardizem)  30 mg  BID


 ORAL


   9/1/18 09:00


 10/1/18 08:59  9/1/18 18:17


 


 


 Docusate Sodium


  (Colace)  100 mg  Q12HR


 ORAL


   8/29/18 21:00


 9/28/18 20:59  9/1/18 21:10


 


 


 Famotidine


  (Pepcid)  20 mg  TWICE A  DAY


 ORAL


   8/30/18 09:00


 9/29/18 08:59  9/1/18 18:17


 


 


 Heparin Sodium/


 Dextrose  500 ml @ 


 29.937 mls/


 hr  adjust per protocol


 IV


   9/1/18 04:30


 9/30/18 14:13  9/1/18 11:52


 


 


 Hydralazine HCl


  (Apresoline)  10 mg  Q6H  PRN


 IV


 For High Blood Pressure  8/29/18 20:00


 9/28/18 19:59   


 


 


 Lorazepam


  (Ativan 2mg/ml


 1ml)  0.5 mg  Q6H  PRN


 IV


 For Anxiety  9/1/18 11:15


 9/8/18 11:14  9/1/18 17:17


 


 


 Metronidazole  100 ml @ 


 100 mls/hr  Q8HR


 IVPB


   8/29/18 22:00


 9/5/18 21:59  9/1/18 22:45


 


 


 Morphine Sulfate


  (Morphine


 Sulfate)  2 mg  Q4H  PRN


 IVP


 Moderate Pain (Pain Scale 4-6)  8/29/18 20:00


 9/5/18 19:59  9/1/18 09:15


 


 


 Morphine Sulfate


  (Morphine


 Sulfate)  4 mg  Q4H  PRN


 IVP


 Severe Pain (Pain Scale 7-10)  8/29/18 20:00


 9/5/18 19:59  8/30/18 18:45


 


 


 Sodium Chloride  1,000 ml @ 


 100 mls/hr  Q10H


 IV


   8/29/18 20:18


 9/28/18 20:17  9/1/18 18:31


 


 


 Zolpidem Tartrate


  (Ambien)  5 mg  BEDTIME  PRN


 ORAL


 Insomnia  8/29/18 22:45


 9/5/18 22:44  8/29/18 22:47


 











Assessment/Plan


Status:  stable, progressing


Assessment/Plan


[place] [facility] [place]





the pt was given seroquel 


the pt was given a shot.











Rodri Medina MD Sep 1, 2018 23:37

## 2018-09-01 NOTE — GENERAL SURGERY PROGRESS NOTE
General Surgery-Progress Note


Subjective


Symptoms:  improved, tolerating diet, passing flatus


Additional Comments


a bit agitated today.  sitting up in bed





Objective





Last 24 Hour Vital Signs








  Date Time  Temp Pulse Resp B/P (MAP) Pulse Ox O2 Delivery O2 Flow Rate FiO2


 


9/1/18 16:17 97.6 105 22 120/63 (82) 98   





 97.6       


 


9/1/18 12:00  96      


 


9/1/18 12:00 97.6 105 22 120/63 (82) 98   





 97.6       


 


9/1/18 09:45 98.3       


 


9/1/18 09:15 98.3       


 


9/1/18 09:13  135  148/76    


 


9/1/18 09:12  135      


 


9/1/18 09:00      Room Air  


 


9/1/18 08:00 97.6 112 20 121/6 (44) 98   





 97.6       


 


9/1/18 08:00  142      


 


9/1/18 04:00  92      


 


9/1/18 04:00 98.3 96 24 148/76 (100) 96   





 98.3       


 


9/1/18 00:00 98.0 80 21 128/71 (90) 94   





 98.0       


 


9/1/18 00:00  86      


 


8/31/18 21:21  87  128/71    


 


8/31/18 21:00      Room Air  


 


8/31/18 20:00 98.1 87 20 128/71 (90) 97   





 98.1       


 


8/31/18 20:00  84      


 


8/31/18 18:20 98.2       


 


8/31/18 17:21 98.2       


 


8/31/18 17:20  128  117/71    








I&O











Intake and Output  


 


 8/31/18 9/1/18





 19:00 07:00


 


Intake Total 240 ml 120 ml


 


Balance 240 ml 120 ml


 


  


 


Intake Oral 240 ml 120 ml


 


# Voids 3 5


 


# Bowel Movements  1








Cardiovascular:  RSR


Respiratory:  clear


Abdomen:  soft, tenderness, present bowel sounds


Extremities:  no cyanosis





Laboratory Tests








Test


  8/31/18


20:50 9/1/18


03:45 9/1/18


10:45


 


Activated Partial


Thromboplast Time 37 SEC (23-33)


H 52 SEC (23-33)


H 51 SEC (23-33)


H


 


White Blood Count


  


  7.1 K/UL


(4.8-10.8) 


 


 


Red Blood Count


  


  3.92 M/UL


(4.70-6.10)  L 


 


 


Hemoglobin


  


  11.2 G/DL


(14.2-18.0)  L 


 


 


Hematocrit


  


  32.9 %


(42.0-52.0)  L 


 


 


Mean Corpuscular Volume  84 FL (80-99)   


 


Mean Corpuscular Hemoglobin


  


  28.5 PG


(27.0-31.0) 


 


 


Mean Corpuscular Hemoglobin


Concent 


  34.0 G/DL


(32.0-36.0) 


 


 


Red Cell Distribution Width


  


  12.9 %


(11.6-14.8) 


 


 


Platelet Count


  


  169 K/UL


(150-450) 


 


 


Mean Platelet Volume


  


  7.0 FL


(6.5-10.1) 


 


 


Neutrophils (%) (Auto)


  


  74.9 %


(45.0-75.0) 


 


 


Lymphocytes (%) (Auto)


  


  9.7 %


(20.0-45.0)  L 


 


 


Monocytes (%) (Auto)


  


  13.3 %


(1.0-10.0)  H 


 


 


Eosinophils (%) (Auto)


  


  1.6 %


(0.0-3.0) 


 


 


Basophils (%) (Auto)


  


  0.5 %


(0.0-2.0) 


 


 


Sodium Level


  


  138 MMOL/L


(136-145) 


 


 


Potassium Level


  


  3.6 MMOL/L


(3.5-5.1) 


 


 


Chloride Level


  


  104 MMOL/L


() 


 


 


Carbon Dioxide Level


  


  28 MMOL/L


(21-32) 


 


 


Anion Gap


  


  7 mmol/L


(5-15) 


 


 


Blood Urea Nitrogen


  


  17 mg/dL


(7-18) 


 


 


Creatinine


  


  0.9 MG/DL


(0.55-1.30) 


 


 


Estimat Glomerular Filtration


Rate 


  > 60 mL/min


(>60) 


 


 


Glucose Level


  


  124 MG/DL


()  H 


 


 


Calcium Level


  


  8.7 MG/DL


(8.5-10.1) 


 


 


Total Bilirubin


  


  0.8 MG/DL


(0.2-1.0) 


 


 


Aspartate Amino Transf


(AST/SGOT) 


  37 U/L (15-37)


  


 


 


Alanine Aminotransferase


(ALT/SGPT) 


  38 U/L (12-78)


  


 


 


Alkaline Phosphatase


  


  186 U/L


()  H 


 


 


Total Protein


  


  6.3 G/DL


(6.4-8.2)  L 


 


 


Albumin


  


  2.3 G/DL


(3.4-5.0)  L 


 


 


Globulin  4.0 g/dL   


 


Albumin/Globulin Ratio


  


  0.6 (1.0-2.7)


L 


 











Plan


Problems:  


(1) Choledocholithiasis


Assessment & Plan:  labs trending down.  


GI input appreciated


defer to GI for ERCP





(2) Abdominal pain


(3) Cholecystitis


Assessment & Plan:  will discuss lap derrick with patient


will need to first clear CBD


okay for diet from surgical standpoint


defer to GI for ERCP vs MRCP


   ERCP Tuesday


possible cholecystectomy after 


will follow with recs.  thank you for this consultation. 














Timothy Heath Sep 1, 2018 16:49

## 2018-09-02 NOTE — GENERAL PROGRESS NOTE
Assessment/Plan


Status:  stable


Assessment/Plan


1) Acute cholecystitis


Choledocholithiasis


r/o Sepsis from a biliary source


-Surgery consulted- recs appreciated


-elevated bilirubin/transaminitis- GI consulted- recs appreciated


-continue IV antibiotics- ciprofloxacin, metronidazole


-check cultures- blood, urine, lactic acid- negative to date


-cardiac diet as tolerated


-serial abd exams


-ERCP planned for Tuesday 9/4





2) Chronic systolic and diastolic heart failure


3) Atrial fibrillation with rapid ventricular response


4) CAD s/p PTCA/stent 6 months ago


-Telemetry monitoring


-continue carvedilol, hydralazine, statin


-hold apixaban given surgery


-Cardiology consulted- recs appreciated- start diltiazem


-Heparin drip for Afib


-ASA given recent stent





5) DARIAN- improving near baseline


-IVF's; likely hypovolemic; doubt sepsis


-avoid nephrotoxins/renally dose meds


-will follow closely





5) Pancytopenia


-Hematology recs appreciated


-monitor for now





6) Agitation, confusion - pt asking to leave AMA, questionable capacity


-Psych consulted for assistance





DVT Prophylaxis: scd's, IV heparin





Code status: full





Hospital Classification declaration: Based on this initial evaluation, and 

depending on the patient's clinical course, I anticipate that this patient will 

require hospitalization for 2-3 days. 





Disposition: Once the patient is stable to leave the hospital, I anticipate the 

patient will likely be discharged to the following environment: SNF- comes from 

[facility]





I spent 40 minutes on this patient's case, and 23 minutes was dedicated to 

counseling and/or care coordination. 





Time of note may not reflect time of encounter.





Subjective


Date patient seen:  Sep 2, 2018


Time patient seen:  14:09


ROS Limited/Unobtainable:  Yes


Constitutional:  Reports: no symptoms


HEENT:  Reports: no symptoms


Cardiovascular:  Reports: no symptoms


Respiratory:  Reports: no symptoms


Gastrointestinal/Abdominal:  Reports: no symptoms


Genitourinary:  Reports: no symptoms


Neurologic/Psychiatric:  Reports: no symptoms


Endocrine:  Reports: no symptoms


Hematologic/Lymphatic:  Reports: no symptoms


Allergies:  


Coded Allergies:  


     AMPICILLIN (Verified  Allergy, Unknown, 8/3/18)


Uncoded Allergies:  


     PENICILLIN (Allergy, Unknown, 8/3/18)


All Systems:  reviewed and negative except above


Subjective


No acute o/n events


Awaiting ERCP on Tues





Pt states abd pain improved. Tolerating diet. Passing flatus. 


Pt asking to leave hospital and go back to [place] where he is from


Per RN, pt noted to be agitated, some confusion noted, refusing meds





Objective





Last 24 Hour Vital Signs








  Date Time  Temp Pulse Resp B/P (MAP) Pulse Ox O2 Delivery O2 Flow Rate FiO2


 


9/2/18 12:00 98.1 100 24 115/64 (81) 96   





 98.1       


 


9/2/18 09:17      Room Air  


 


9/2/18 08:12 97.9 89 20 112/65 (81) 96   





 97.9       


 


9/2/18 04:00 97.0 99 20 124/67 (86) 94   





 97.0       


 


9/2/18 04:00  89      


 


9/2/18 00:00  90      


 


9/2/18 00:00 97.9 92 24 135/69 (91) 94   





 97.9       


 


9/1/18 21:11  94  138/89    


 


9/1/18 21:00      Room Air  


 


9/1/18 20:00 97.7 103 26 138/89 (105) 96   





 97.7       


 


9/1/18 20:00  118      


 


9/1/18 18:17  105  120/63    


 


9/1/18 16:17 97.6 105 22 120/63 (82) 98   





 97.6       


 


9/1/18 16:00  92      

















Intake and Output  


 


 9/1/18 9/2/18





 19:00 07:00


 


Intake Total 960 ml 129.937 ml


 


Output Total  400 ml


 


Balance 960 ml -270.063 ml


 


  


 


Intake Oral 960 ml 100 ml


 


IV Total  29.937 ml


 


Output Urine Total  400 ml


 


# Voids 6 


 


# Bowel Movements  1








Laboratory Tests


9/1/18 18:05: Activated Partial Thromboplast Time 68H


9/2/18 04:45: 


Activated Partial Thromboplast Time 69H, White Blood Count 6.2, Red Blood Count 

3.58L, Hemoglobin 10.3L, Hematocrit 30.2L, Mean Corpuscular Volume 84, Mean 

Corpuscular Hemoglobin 28.9, Mean Corpuscular Hemoglobin Concent 34.3, Red Cell 

Distribution Width 13.0, Platelet Count 155, Mean Platelet Volume 6.5, 

Neutrophils (%) (Auto) 68.0, Lymphocytes (%) (Auto) 12.7L, Monocytes (%) (Auto) 

15.0H, Eosinophils (%) (Auto) 3.5H, Basophils (%) (Auto) 0.8, Sodium Level 141, 

Potassium Level 3.4L, Chloride Level 107, Carbon Dioxide Level 27, Anion Gap 7, 

Blood Urea Nitrogen 12, Creatinine 0.8, Estimat Glomerular Filtration Rate > 60

, Glucose Level 139H, Calcium Level 8.6, Total Bilirubin 0.5, Aspartate Amino 

Transf (AST/SGOT) 32, Alanine Aminotransferase (ALT/SGPT) 32, Alkaline 

Phosphatase 187H, Total Protein 6.1L, Albumin 2.3L, Globulin 3.8, Albumin/

Globulin Ratio 0.6L


Height (Feet):  5


Height (Inches):  9.00


Weight (Pounds):  150


Objective


General: alert, cooperative, no distress, appears stated age


Head: normocephalic, without obvious abnormality, atraumatic


Eyes: conjunctivae/corneas clear. PERRL, EOM's intact


Throat: lips, mucosa, and tongue normal. MMM


Neck: supple, symmetrical, trachea midline, and no JVD


Lungs: clear to auscultation bilaterally


Heart: regular rate and rhythm, S1, S2 normal, no murmur, click, rub or gallop


Abdomen: soft, non-tender, non-distended, bowel sounds normal; no masses or 

organomegaly


Extremities: extremities normal, atraumatic, no cyanosis or edema


Pulses: 2+ and symmetric


Skin: skin color, texture, turgor normal; no rashes or lesions


Neurologic: grossly normal, no focal deficits











Viri Trent M.D. Sep 2, 2018 14:09

## 2018-09-02 NOTE — GENERAL PROGRESS NOTE
Assessment/Plan


Status:  stable


Assessment/Plan


1. Pancytopenia, potentially secondary to liver disease, bilirubin elevated on 

admission. The patient had transaminitis on admission as well. MCV is elevated.

  


--> Hepatitis A and B are negative, Hep-C is positive, HIV negative. 


--> Ultrasound of the abdomen reviewed. No evidence of cirrhosis.  


--> We will continue to closely monitor.  


--> Monitor platelet count closely.


--> obtain a bone marrow biopsy if in future counts are worse


2. Anemia of chronic disease. Anemia panel has been reviewed. Will trend CBC 

daily. 


--> Hemoglobin goal is above 7.  


--> Current Hgb at 10.3, stable 


3. Anemia due to underlying congestive heart failure, decompensated state.


4. Cholecystitis. ERCP planned for 9/4


--> to see surgery as well as gi team


5. Possible viral urinary tract infection.


6. Transaminitis, hepatic congestion questionable.


7. Deep venous thrombosis prophylaxis, heparin subcutaneous.








The time the note was entered does not necessarily correspond to the time the 

patient was seen.





Subjective


Date patient seen:  Sep 2, 2018


ROS Limited/Unobtainable:  Yes


Hematologic/Lymphatic:  Reports: anemia


Allergies:  


Coded Allergies:  


     AMPICILLIN (Verified  Allergy, Unknown, 8/3/18)


Uncoded Allergies:  


     PENICILLIN (Allergy, Unknown, 8/3/18)


All Systems:  reviewed and negative except above


Subjective


Pt awake and agitated.  No acute events. Possible ERCP for Tuesday.





Objective





Last 24 Hour Vital Signs








  Date Time  Temp Pulse Resp B/P (MAP) Pulse Ox O2 Delivery O2 Flow Rate FiO2


 


9/2/18 12:00 98.1 100 24 115/64 (81) 96   





 98.1       


 


9/2/18 09:17      Room Air  


 


9/2/18 08:12 97.9 89 20 112/65 (81) 96   





 97.9       


 


9/2/18 04:00 97.0 99 20 124/67 (86) 94   





 97.0       


 


9/2/18 04:00  89      


 


9/2/18 00:00  90      


 


9/2/18 00:00 97.9 92 24 135/69 (91) 94   





 97.9       


 


9/1/18 21:11  94  138/89    


 


9/1/18 21:00      Room Air  


 


9/1/18 20:00 97.7 103 26 138/89 (105) 96   





 97.7       


 


9/1/18 20:00  118      


 


9/1/18 18:17  105  120/63    


 


9/1/18 16:17 97.6 105 22 120/63 (82) 98   





 97.6       


 


9/1/18 16:00  92      

















Intake and Output  


 


 9/1/18 9/2/18





 19:00 07:00


 


Intake Total 960 ml 129.937 ml


 


Output Total  400 ml


 


Balance 960 ml -270.063 ml


 


  


 


Intake Oral 960 ml 100 ml


 


IV Total  29.937 ml


 


Output Urine Total  400 ml


 


# Voids 6 


 


# Bowel Movements  1








Laboratory Tests


9/1/18 18:05: Activated Partial Thromboplast Time 68H


9/2/18 04:45: 


Activated Partial Thromboplast Time 69H, White Blood Count 6.2, Red Blood Count 

3.58L, Hemoglobin 10.3L, Hematocrit 30.2L, Mean Corpuscular Volume 84, Mean 

Corpuscular Hemoglobin 28.9, Mean Corpuscular Hemoglobin Concent 34.3, Red Cell 

Distribution Width 13.0, Platelet Count 155, Mean Platelet Volume 6.5, 

Neutrophils (%) (Auto) 68.0, Lymphocytes (%) (Auto) 12.7L, Monocytes (%) (Auto) 

15.0H, Eosinophils (%) (Auto) 3.5H, Basophils (%) (Auto) 0.8, Sodium Level 141, 

Potassium Level 3.4L, Chloride Level 107, Carbon Dioxide Level 27, Anion Gap 7, 

Blood Urea Nitrogen 12, Creatinine 0.8, Estimat Glomerular Filtration Rate > 60

, Glucose Level 139H, Calcium Level 8.6, Total Bilirubin 0.5, Aspartate Amino 

Transf (AST/SGOT) 32, Alanine Aminotransferase (ALT/SGPT) 32, Alkaline 

Phosphatase 187H, Total Protein 6.1L, Albumin 2.3L, Globulin 3.8, Albumin/

Globulin Ratio 0.6L


Height (Feet):  5


Height (Inches):  9.00


Weight (Pounds):  150


General Appearance:  no apparent distress


EENT:  PERRL/EOMI


Neck:  normal alignment


Cardiovascular:  tachycardia


Respiratory/Chest:  no respiratory distress


Abdomen:  soft











Jack Benitez MD Sep 2, 2018 13:53

## 2018-09-02 NOTE — CARDIOLOGY PROGRESS NOTE
Assessment/Plan


Assessment/Plan


atrial fibrillation, rate is better controlled and at rest it is 70. He is on 

IV Heparin,


 noted K level, will give more potassium





Subjective


Subjective


the patient is resting comfortably, sleepy, denies palpitations or dyspnea





Objective





Last 24 Hour Vital Signs








  Date Time  Temp Pulse Resp B/P (MAP) Pulse Ox O2 Delivery O2 Flow Rate FiO2


 


9/2/18 09:17      Room Air  


 


9/2/18 08:12 97.9 89 20 112/65 (81) 96   





 97.9       


 


9/2/18 04:00 97.0 99 20 124/67 (86) 94   





 97.0       


 


9/2/18 04:00  89      


 


9/2/18 00:00  90      


 


9/2/18 00:00 97.9 92 24 135/69 (91) 94   





 97.9       


 


9/1/18 21:11  94  138/89    


 


9/1/18 21:00      Room Air  


 


9/1/18 20:00 97.7 103 26 138/89 (105) 96   





 97.7       


 


9/1/18 20:00  118      


 


9/1/18 18:17  105  120/63    


 


9/1/18 16:17 97.6 105 22 120/63 (82) 98   





 97.6       


 


9/1/18 16:00  92      


 


9/1/18 12:00  96      


 


9/1/18 12:00 97.6 105 22 120/63 (82) 98   





 97.6       








General Appearance:  cachetic


EENT:  PERRL/EOMI


Neck:  no JVD


Rhythm:  Afib


Cardiovascular:  tachycardia, systolic murmur


Respiratory/Chest:  no respiratory distress


Abdomen:  non tender


Extremities:  trace edema











Intake and Output  


 


 9/1/18 9/2/18





 19:00 07:00


 


Intake Total 960 ml 129.937 ml


 


Output Total  400 ml


 


Balance 960 ml -270.063 ml


 


  


 


Intake Oral 960 ml 100 ml


 


IV Total  29.937 ml


 


Output Urine Total  400 ml


 


# Voids 6 


 


# Bowel Movements  1











Laboratory Tests








Test


  9/1/18


18:05 9/2/18


04:45


 


Activated Partial


Thromboplast Time 68 SEC (23-33)


H 69 SEC (23-33)


H


 


White Blood Count


  


  6.2 K/UL


(4.8-10.8)


 


Red Blood Count


  


  3.58 M/UL


(4.70-6.10)  L


 


Hemoglobin


  


  10.3 G/DL


(14.2-18.0)  L


 


Hematocrit


  


  30.2 %


(42.0-52.0)  L


 


Mean Corpuscular Volume  84 FL (80-99)  


 


Mean Corpuscular Hemoglobin


  


  28.9 PG


(27.0-31.0)


 


Mean Corpuscular Hemoglobin


Concent 


  34.3 G/DL


(32.0-36.0)


 


Red Cell Distribution Width


  


  13.0 %


(11.6-14.8)


 


Platelet Count


  


  155 K/UL


(150-450)


 


Mean Platelet Volume


  


  6.5 FL


(6.5-10.1)


 


Neutrophils (%) (Auto)


  


  68.0 %


(45.0-75.0)


 


Lymphocytes (%) (Auto)


  


  12.7 %


(20.0-45.0)  L


 


Monocytes (%) (Auto)


  


  15.0 %


(1.0-10.0)  H


 


Eosinophils (%) (Auto)


  


  3.5 %


(0.0-3.0)  H


 


Basophils (%) (Auto)


  


  0.8 %


(0.0-2.0)


 


Sodium Level


  


  141 MMOL/L


(136-145)


 


Potassium Level


  


  3.4 MMOL/L


(3.5-5.1)  L


 


Chloride Level


  


  107 MMOL/L


()


 


Carbon Dioxide Level


  


  27 MMOL/L


(21-32)


 


Anion Gap


  


  7 mmol/L


(5-15)


 


Blood Urea Nitrogen


  


  12 mg/dL


(7-18)


 


Creatinine


  


  0.8 MG/DL


(0.55-1.30)


 


Estimat Glomerular Filtration


Rate 


  > 60 mL/min


(>60)


 


Glucose Level


  


  139 MG/DL


()  H


 


Calcium Level


  


  8.6 MG/DL


(8.5-10.1)


 


Total Bilirubin


  


  0.5 MG/DL


(0.2-1.0)


 


Aspartate Amino Transf


(AST/SGOT) 


  32 U/L (15-37)


 


 


Alanine Aminotransferase


(ALT/SGPT) 


  32 U/L (12-78)


 


 


Alkaline Phosphatase


  


  187 U/L


()  H


 


Total Protein


  


  6.1 G/DL


(6.4-8.2)  L


 


Albumin


  


  2.3 G/DL


(3.4-5.0)  L


 


Globulin  3.8 g/dL  


 


Albumin/Globulin Ratio


  


  0.6 (1.0-2.7)


L

















Celsa Estrella MD Sep 2, 2018 10:58

## 2018-09-03 NOTE — GENERAL PROGRESS NOTE
Assessment/Plan


Status:  progressing


Assessment/Plan


1) Acute cholecystitis


Choledocholithiasis


r/o Sepsis from a biliary source


-Surgery consulted- recs appreciated


-elevated bilirubin/transaminitis- GI consulted- recs appreciated


-continue IV antibiotics- ciprofloxacin, metronidazole


-check cultures- blood, urine, lactic acid- negative to date


- EGD/ERCP  delayed- will attempt tomorrow


-NPO after midnight





2) Chronic systolic and diastolic heart failure


3) Atrial fibrillation with rapid ventricular response


4) CAD s/p PTCA/stent 6 months ago


-Telemetry monitoring


-continue carvedilol, hydralazine, statin


-hold apixaban given surgery


-Cardiology consulted- recs appreciated- started diltiazem


-Continue heparin drip, ASA given recent stent and atrial fibrillation


-replete K, Mg





5) DARIAN- improving near baseline


-IVF's; likely hypovolemic; doubt sepsis


-avoid nephrotoxins/renally dose meds


-will follow closely





6) Pancytopenia


-Hematology recs appreciated


-monitor for now





DVT Prophylaxis: scd's, IV heparin





Code status: full





Hospital Classification declaration: Based on this initial evaluation, and 

depending on the patient's clinical course, I anticipate that this patient will 

require hospitalization for 2-3 days. 





Disposition: Once the patient is stable to leave the hospital, I anticipate the 

patient will likely be discharged to the following environment:SNF- comes from 

[facility]





I spent 45 minutes on this patient's case, and 23 minutes was dedicated to 

counseling and/or care coordination. 





Time of note may not reflect time of encounter.





Subjective


Date patient seen:  Sep 3, 2018


Time patient seen:  11:44


ROS Limited/Unobtainable:  Yes


Constitutional:  Reports: no symptoms


HEENT:  Reports: no symptoms


Cardiovascular:  Reports: no symptoms


Respiratory:  Reports: no symptoms


Gastrointestinal/Abdominal:  Reports: abdominal pain


Genitourinary:  Reports: no symptoms


Neurologic/Psychiatric:  Reports: no symptoms


Endocrine:  Reports: no symptoms


Allergies:  


Coded Allergies:  


     AMPICILLIN (Verified  Allergy, Unknown, 8/3/18)


Uncoded Allergies:  


     PENICILLIN (Allergy, Unknown, 8/3/18)


All Systems:  reviewed and negative except above


Subjective


Events of overnight reviewed


Chart reviewed by me


Patient without complaints this AM


Denies abdominal pain currently


NO N/V





Objective





Last 24 Hour Vital Signs








  Date Time  Temp Pulse Resp B/P (MAP) Pulse Ox O2 Delivery O2 Flow Rate FiO2


 


9/3/18 12:00 97.7 86 24 138/63 (88) 95   





 97.7       


 


9/3/18 12:00  154      


 


9/3/18 09:20  101  142/71    


 


9/3/18 09:20  101  142/71    


 


9/3/18 09:00      Room Air  


 


9/3/18 08:00 98.6 101 24 142/71 (94) 94   





 98.6       


 


9/3/18 08:00  95      


 


9/3/18 04:00  121      


 


9/3/18 04:00 98.0 110 18 125/90 (102) 96   





 98.0       


 


9/3/18 00:00 98.2 86 18 110/65 (80) 96   





 98.2       


 


9/3/18 00:00  98      


 


9/2/18 21:00      Room Air  


 


9/2/18 20:45  110  125/65    


 


9/2/18 20:00 98.4 103 18 125/65 (85) 93   





 98.4       


 


9/2/18 20:00  126      


 


9/2/18 19:26  145  144/89    


 


9/2/18 16:43  136  115/64    


 


9/2/18 16:00  150      

















Intake and Output  


 


 9/2/18 9/3/18





 19:00 07:00


 


Intake Total 639.118 ml 2059.292 ml


 


Balance 639.118 ml 2059.292 ml


 


  


 


Intake Oral 150 ml 100 ml


 


IV Total 489.118 ml 1959.292 ml


 


# Voids 2 4


 


# Bowel Movements  1








Laboratory Tests


9/3/18 04:55: 


White Blood Count 7.1, Red Blood Count 3.80L, Hemoglobin 10.4L, Hematocrit 31.8L

, Mean Corpuscular Volume 84, Mean Corpuscular Hemoglobin 27.5, Mean 

Corpuscular Hemoglobin Concent 32.8, Red Cell Distribution Width 12.6, Platelet 

Count 174, Mean Platelet Volume 6.6, Neutrophils (%) (Auto) 65.1, Lymphocytes (%

) (Auto) 13.8L, Monocytes (%) (Auto) 16.4H, Eosinophils (%) (Auto) 3.7H, 

Basophils (%) (Auto) 1.0, Activated Partial Thromboplast Time 56H, Sodium Level 

140, Potassium Level 3.5, Chloride Level 108H, Carbon Dioxide Level 29, Anion 

Gap 3L, Blood Urea Nitrogen 8, Creatinine 0.7, Estimat Glomerular Filtration 

Rate > 60, Glucose Level 109H, Calcium Level 8.1L


9/3/18 12:00: 


Activated Partial Thromboplast Time 90H, Magnesium Level 1.7L, Thyroid 

Stimulating Hormone (TSH) 1.428


Height (Feet):  5


Height (Inches):  9.00


Weight (Pounds):  150


General Appearance:  no apparent distress, alert


EENT:  PERRL/EOMI, TMs normal, pharynx normal


Neck:  non-tender, supple


Cardiovascular:  normal rate, no JVD, irregularly irregular


Respiratory/Chest:  lungs clear, normal breath sounds


Abdomen:  normal bowel sounds, non tender, tender


Pelvis:  normal external exam


Extremities:  normal range of motion


Edema:  trace edema


Neurologic:  alert


Skin:  normal pigmentation, warm/dry


Lymphatic:  normal anterior cervical (L), normal anterior cervical (R)











Gino Stover MD Sep 3, 2018 13:48

## 2018-09-03 NOTE — GENERAL PROGRESS NOTE
Assessment/Plan


Assessment/Plan


1. Pancytopenia, potentially secondary to liver disease, bilirubin elevated on 

admission. The patient had transaminitis on admission as well. MCV is elevated.

  


--> Hepatitis A and B are negative, Hep-C is positive, HIV negative. 


--> Ultrasound of the abdomen reviewed. No evidence of cirrhosis.  


--> We will continue to closely monitor.  


--> Monitor platelet count closely.


--> Obtain a bone marrow biopsy if in future counts are worse


2. Anemia of chronic disease. Anemia panel has been reviewed. Will trend CBC 

daily. 


--> Hemoglobin goal is above 7.  


--> Current Hgb at 10.3, stable 


3. Anemia due to underlying congestive heart failure, decompensated state.


4. Cholecystitis. ERCP planned for 9/4


--> results of ercp pending


5. Possible viral urinary tract infection.


6. Transaminitis, hepatic congestion questionable.


7. Deep venous thrombosis prophylaxis, heparin subcutaneous.








The time the note was entered does not necessarily correspond to the time the 

patient was seen.





Subjective


Constitutional:  Reports: no symptoms


HEENT:  Reports: no symptoms


Cardiovascular:  Reports: no symptoms


Respiratory:  Reports: no symptoms


Gastrointestinal/Abdominal:  Reports: no symptoms


Genitourinary:  Reports: no symptoms


Neurologic/Psychiatric:  Reports: no symptoms


Endocrine:  Reports: no symptoms


Hematologic/Lymphatic:  Reports: anemia


Allergies:  


Coded Allergies:  


     AMPICILLIN (Verified  Allergy, Unknown, 8/3/18)


Uncoded Allergies:  


     PENICILLIN (Allergy, Unknown, 8/3/18)


Subjective


Pt awake and agitated.  No acute events. Possible ERCP for Tuesday.





Objective





Last 24 Hour Vital Signs








  Date Time  Temp Pulse Resp B/P (MAP) Pulse Ox O2 Delivery O2 Flow Rate FiO2


 


9/3/18 09:20  101  142/71    


 


9/3/18 09:20  101  142/71    


 


9/3/18 09:00      Room Air  


 


9/3/18 08:00 98.6 101 24 142/71 (94) 94   





 98.6       


 


9/3/18 08:00  95      


 


9/3/18 04:00  121      


 


9/3/18 04:00 98.0 110 18 125/90 (102) 96   





 98.0       


 


9/3/18 00:00 98.2 86 18 110/65 (80) 96   





 98.2       


 


9/3/18 00:00  98      


 


9/2/18 21:00      Room Air  


 


9/2/18 20:45  110  125/65    


 


9/2/18 20:00 98.4 103 18 125/65 (85) 93   





 98.4       


 


9/2/18 20:00  126      


 


9/2/18 19:26  145  144/89    


 


9/2/18 16:43  136  115/64    


 


9/2/18 16:00  150      


 


9/2/18 12:00  136      


 


9/2/18 12:00 98.1 100 24 115/64 (81) 96   





 98.1       

















Intake and Output  


 


 9/2/18 9/3/18





 19:00 07:00


 


Intake Total 639.118 ml 2059.292 ml


 


Balance 639.118 ml 2059.292 ml


 


  


 


Intake Oral 150 ml 100 ml


 


IV Total 489.118 ml 1959.292 ml


 


# Voids 2 4


 


# Bowel Movements  1








Laboratory Tests


9/3/18 04:55: 


White Blood Count 7.1, Red Blood Count 3.80L, Hemoglobin 10.4L, Hematocrit 31.8L

, Mean Corpuscular Volume 84, Mean Corpuscular Hemoglobin 27.5, Mean 

Corpuscular Hemoglobin Concent 32.8, Red Cell Distribution Width 12.6, Platelet 

Count 174, Mean Platelet Volume 6.6, Neutrophils (%) (Auto) 65.1, Lymphocytes (%

) (Auto) 13.8L, Monocytes (%) (Auto) 16.4H, Eosinophils (%) (Auto) 3.7H, 

Basophils (%) (Auto) 1.0, Activated Partial Thromboplast Time 56H, Sodium Level 

140, Potassium Level 3.5, Chloride Level 108H, Carbon Dioxide Level 29, Anion 

Gap 3L, Blood Urea Nitrogen 8, Creatinine 0.7, Estimat Glomerular Filtration 

Rate > 60, Glucose Level 109H, Calcium Level 8.1L


Height (Feet):  5


Height (Inches):  9.00


Weight (Pounds):  150


General Appearance:  no apparent distress


EENT:  normal ENT inspection


Neck:  supple


Cardiovascular:  regular rhythm


Respiratory/Chest:  normal breath sounds


Abdomen:  no organomegaly


Extremities:  normal inspection


Edema:  no edema noted Leg (L), no edema noted Leg (R)


Edema:  mild edema


Neurologic:  alert


Skin:  warm/dry











Jack Benitez MD Sep 3, 2018 10:21

## 2018-09-03 NOTE — GENERAL PROGRESS NOTE
Assessment/Plan


Problem List:  


(1) Choledocholithiasis


ICD Codes:  K80.50 - Calculus of bile duct without cholangitis or cholecystitis 

without obstruction


SNOMED:  972245077


(2) Cholecystitis


ICD Codes:  K81.9 - Cholecystitis, unspecified


SNOMED:  98369257


(3) Atrial fibrillation with RVR


ICD Codes:  I48.91 - Unspecified atrial fibrillation


SNOMED:  697352489582669


(4) COPD (chronic obstructive pulmonary disease)


ICD Codes:  J44.9 - Chronic obstructive pulmonary disease, unspecified


SNOMED:  46536420


(5) Episode of generalized weakness


ICD Codes:  R53.1 - Weakness


SNOMED:  41643091


(6) Abdominal pain


ICD Codes:  R10.9 - Unspecified abdominal pain


SNOMED:  20558741


Qualifiers:  


   Qualified Codes:  R10.11 - Right upper quadrant pain


Assessment/Plan


Needs ERCP


plan for AM


patient stable


abx


advance diet>>on cardiac diet


fu labs


fu surg recs





Subjective


ROS Limited/Unobtainable:  Yes


Allergies:  


Coded Allergies:  


     AMPICILLIN (Verified  Allergy, Unknown, 8/3/18)


Uncoded Allergies:  


     PENICILLIN (Allergy, Unknown, 8/3/18)





Objective





Last 24 Hour Vital Signs








  Date Time  Temp Pulse Resp B/P (MAP) Pulse Ox O2 Delivery O2 Flow Rate FiO2


 


9/3/18 04:00  121      


 


9/3/18 04:00 98.0 110 18 125/90 (102) 96   





 98.0       


 


9/3/18 00:00 98.2 86 18 110/65 (80) 96   





 98.2       


 


9/3/18 00:00  98      


 


9/2/18 21:00      Room Air  


 


9/2/18 20:45  110  125/65    


 


9/2/18 20:00 98.4 103 18 125/65 (85) 93   





 98.4       


 


9/2/18 20:00  126      


 


9/2/18 19:26  145  144/89    


 


9/2/18 16:43  136  115/64    


 


9/2/18 16:00  150      


 


9/2/18 12:00  136      


 


9/2/18 12:00 98.1 100 24 115/64 (81) 96   





 98.1       


 


9/2/18 09:17      Room Air  


 


9/2/18 08:12 97.9 89 20 112/65 (81) 96   





 97.9       


 


9/2/18 08:00  95      

















Intake and Output  


 


 9/2/18 9/3/18





 19:00 07:00


 


Intake Total 639.118 ml 2059.292 ml


 


Balance 639.118 ml 2059.292 ml


 


  


 


Intake Oral 150 ml 100 ml


 


IV Total 489.118 ml 1959.292 ml


 


# Voids 2 4


 


# Bowel Movements  1








Laboratory Tests


9/3/18 04:55: 


White Blood Count 7.1, Red Blood Count 3.80L, Hemoglobin 10.4L, Hematocrit 31.8L

, Mean Corpuscular Volume 84, Mean Corpuscular Hemoglobin 27.5, Mean 

Corpuscular Hemoglobin Concent 32.8, Red Cell Distribution Width 12.6, Platelet 

Count 174, Mean Platelet Volume 6.6, Neutrophils (%) (Auto) 65.1, Lymphocytes (%

) (Auto) 13.8L, Monocytes (%) (Auto) 16.4H, Eosinophils (%) (Auto) 3.7H, 

Basophils (%) (Auto) 1.0, Activated Partial Thromboplast Time 56H, Sodium Level 

140, Potassium Level 3.5, Chloride Level 108H, Carbon Dioxide Level 29, Anion 

Gap 3L, Blood Urea Nitrogen 8, Creatinine 0.7, Estimat Glomerular Filtration 

Rate > 60, Glucose Level 109H, Calcium Level 8.1L


Height (Feet):  5


Height (Inches):  9.00


Weight (Pounds):  150


General Appearance:  alert


EENT:  normal ENT inspection


Neck:  supple


Cardiovascular:  normal rate


Respiratory/Chest:  decreased breath sounds


Abdomen:  normal bowel sounds, non tender, soft


Extremities:  non-tender











Jose Akbar MD Sep 3, 2018 07:39

## 2018-09-03 NOTE — CARDIOLOGY PROGRESS NOTE
Assessment/Plan


Assessment/Plan


his K is 3.5, needs replacement 


also will check his Mg level and TSH 


His heart rate is better controlled now, and only goes up when he is moving and 

getting agitated, that is no reason to advance heart rate medications d/w nurse





Subjective


Subjective


the patient is resting comfortably, sleepy, denies palpitations or dyspnea 

intermittently he is getting agitated and his heart rate goes up.





Objective





Last 24 Hour Vital Signs








  Date Time  Temp Pulse Resp B/P (MAP) Pulse Ox O2 Delivery O2 Flow Rate FiO2


 


9/3/18 09:20  101  142/71    


 


9/3/18 09:20  101  142/71    


 


9/3/18 09:00      Room Air  


 


9/3/18 08:00 98.6 101 24 142/71 (94) 94   





 98.6       


 


9/3/18 08:00  95      


 


9/3/18 04:00  121      


 


9/3/18 04:00 98.0 110 18 125/90 (102) 96   





 98.0       


 


9/3/18 00:00 98.2 86 18 110/65 (80) 96   





 98.2       


 


9/3/18 00:00  98      


 


9/2/18 21:00      Room Air  


 


9/2/18 20:45  110  125/65    


 


9/2/18 20:00 98.4 103 18 125/65 (85) 93   





 98.4       


 


9/2/18 20:00  126      


 


9/2/18 19:26  145  144/89    


 


9/2/18 16:43  136  115/64    


 


9/2/18 16:00  150      


 


9/2/18 12:00  136      


 


9/2/18 12:00 98.1 100 24 115/64 (81) 96   





 98.1       








General Appearance:  lethargic, other -  confused


EENT:  PERRL/EOMI


Neck:  no JVD


Rhythm:  Afib


Cardiovascular:  tachycardia


Respiratory/Chest:  rhonchi - bilaterally


Abdomen:  non tender


Extremities:  normal capillary refill











Intake and Output  


 


 9/2/18 9/3/18





 19:00 07:00


 


Intake Total 639.118 ml 2059.292 ml


 


Balance 639.118 ml 2059.292 ml


 


  


 


Intake Oral 150 ml 100 ml


 


IV Total 489.118 ml 1959.292 ml


 


# Voids 2 4


 


# Bowel Movements  1











Laboratory Tests








Test


  9/3/18


04:55


 


White Blood Count


  7.1 K/UL


(4.8-10.8)


 


Red Blood Count


  3.80 M/UL


(4.70-6.10)  L


 


Hemoglobin


  10.4 G/DL


(14.2-18.0)  L


 


Hematocrit


  31.8 %


(42.0-52.0)  L


 


Mean Corpuscular Volume 84 FL (80-99)  


 


Mean Corpuscular Hemoglobin


  27.5 PG


(27.0-31.0)


 


Mean Corpuscular Hemoglobin


Concent 32.8 G/DL


(32.0-36.0)


 


Red Cell Distribution Width


  12.6 %


(11.6-14.8)


 


Platelet Count


  174 K/UL


(150-450)


 


Mean Platelet Volume


  6.6 FL


(6.5-10.1)


 


Neutrophils (%) (Auto)


  65.1 %


(45.0-75.0)


 


Lymphocytes (%) (Auto)


  13.8 %


(20.0-45.0)  L


 


Monocytes (%) (Auto)


  16.4 %


(1.0-10.0)  H


 


Eosinophils (%) (Auto)


  3.7 %


(0.0-3.0)  H


 


Basophils (%) (Auto)


  1.0 %


(0.0-2.0)


 


Activated Partial


Thromboplast Time 56 SEC (23-33)


H


 


Sodium Level


  140 MMOL/L


(136-145)


 


Potassium Level


  3.5 MMOL/L


(3.5-5.1)


 


Chloride Level


  108 MMOL/L


()  H


 


Carbon Dioxide Level


  29 MMOL/L


(21-32)


 


Anion Gap


  3 mmol/L


(5-15)  L


 


Blood Urea Nitrogen


  8 mg/dL (7-18)


 


 


Creatinine


  0.7 MG/DL


(0.55-1.30)


 


Estimat Glomerular Filtration


Rate > 60 mL/min


(>60)


 


Glucose Level


  109 MG/DL


()  H


 


Calcium Level


  8.1 MG/DL


(8.5-10.1)  L

















Celsa Estrella MD Sep 3, 2018 11:47

## 2018-09-03 NOTE — GENERAL SURGERY PROGRESS NOTE
General Surgery-Progress Note


Subjective


Symptoms:  improved, tolerating diet, passing flatus, BM


Additional Comments


no acute events.





Objective





Last 24 Hour Vital Signs








  Date Time  Temp Pulse Resp B/P (MAP) Pulse Ox O2 Delivery O2 Flow Rate FiO2


 


9/3/18 09:20  101  142/71    


 


9/3/18 09:20  101  142/71    


 


9/3/18 09:00      Room Air  


 


9/3/18 08:00 98.6 101 24 142/71 (94) 94   





 98.6       


 


9/3/18 08:00  95      


 


9/3/18 04:00  121      


 


9/3/18 04:00 98.0 110 18 125/90 (102) 96   





 98.0       


 


9/3/18 00:00 98.2 86 18 110/65 (80) 96   





 98.2       


 


9/3/18 00:00  98      


 


9/2/18 21:00      Room Air  


 


9/2/18 20:45  110  125/65    


 


9/2/18 20:00 98.4 103 18 125/65 (85) 93   





 98.4       


 


9/2/18 20:00  126      


 


9/2/18 19:26  145  144/89    


 


9/2/18 16:43  136  115/64    


 


9/2/18 16:00  150      


 


9/2/18 12:00  136      


 


9/2/18 12:00 98.1 100 24 115/64 (81) 96   





 98.1       








I&O











Intake and Output  


 


 9/2/18 9/3/18





 19:00 07:00


 


Intake Total 639.118 ml 2059.292 ml


 


Balance 639.118 ml 2059.292 ml


 


  


 


Intake Oral 150 ml 100 ml


 


IV Total 489.118 ml 1959.292 ml


 


# Voids 2 4


 


# Bowel Movements  1








Drains:  none


Cardiovascular:  RSR


Respiratory:  clear


Abdomen:  soft, flat, tenderness, present bowel sounds


Extremities:  no edema, no tenderness, no cyanosis





Laboratory Tests








Test


  9/3/18


04:55


 


White Blood Count


  7.1 K/UL


(4.8-10.8)


 


Red Blood Count


  3.80 M/UL


(4.70-6.10)  L


 


Hemoglobin


  10.4 G/DL


(14.2-18.0)  L


 


Hematocrit


  31.8 %


(42.0-52.0)  L


 


Mean Corpuscular Volume 84 FL (80-99)  


 


Mean Corpuscular Hemoglobin


  27.5 PG


(27.0-31.0)


 


Mean Corpuscular Hemoglobin


Concent 32.8 G/DL


(32.0-36.0)


 


Red Cell Distribution Width


  12.6 %


(11.6-14.8)


 


Platelet Count


  174 K/UL


(150-450)


 


Mean Platelet Volume


  6.6 FL


(6.5-10.1)


 


Neutrophils (%) (Auto)


  65.1 %


(45.0-75.0)


 


Lymphocytes (%) (Auto)


  13.8 %


(20.0-45.0)  L


 


Monocytes (%) (Auto)


  16.4 %


(1.0-10.0)  H


 


Eosinophils (%) (Auto)


  3.7 %


(0.0-3.0)  H


 


Basophils (%) (Auto)


  1.0 %


(0.0-2.0)


 


Activated Partial


Thromboplast Time 56 SEC (23-33)


H


 


Sodium Level


  140 MMOL/L


(136-145)


 


Potassium Level


  3.5 MMOL/L


(3.5-5.1)


 


Chloride Level


  108 MMOL/L


()  H


 


Carbon Dioxide Level


  29 MMOL/L


(21-32)


 


Anion Gap


  3 mmol/L


(5-15)  L


 


Blood Urea Nitrogen


  8 mg/dL (7-18)


 


 


Creatinine


  0.7 MG/DL


(0.55-1.30)


 


Estimat Glomerular Filtration


Rate > 60 mL/min


(>60)


 


Glucose Level


  109 MG/DL


()  H


 


Calcium Level


  8.1 MG/DL


(8.5-10.1)  L











Plan


Problems:  


(1) Choledocholithiasis


Assessment & Plan:  labs trending down.  


GI input appreciated


defer to GI for ERCP





(2) Abdominal pain


(3) Cholecystitis


Assessment & Plan:  will discuss lap derrick with patient


will need to first clear CBD


okay for diet from surgical standpoint


defer to GI for ERCP vs MRCP


   ERCP Tuesday


possible cholecystectomy after 


will follow with recs.  thank you for this consultation. 














Timothy Heath Sep 3, 2018 10:10

## 2018-09-04 NOTE — GENERAL PROGRESS NOTE
Assessment/Plan


Assessment/Plan


Assesement


- cholecystitis


- choledocholithiasis, s/p ERCP and stone extraction


- anemia


- patient hesitant to accept surgery/lap derrick





Recommendations


- IVF


- Abx


- follow LFT


- Await surgery


- colonoscopy at later date, if patient agreeable





Subjective


Allergies:  


Coded Allergies:  


     AMPICILLIN (Verified  Allergy, Unknown, 8/3/18)


Uncoded Allergies:  


     PENICILLIN (Allergy, Unknown, 8/3/18)


Subjective


Seen this am 


feels OK 


anxious about procedures


subsequently had ERCP with sphincterotomy and stone extraction





Objective





Last 24 Hour Vital Signs








  Date Time  Temp Pulse Resp B/P (MAP) Pulse Ox O2 Delivery O2 Flow Rate FiO2


 


9/4/18 21:32  81  103/64    


 


9/4/18 21:00  81  103/64    


 


9/4/18 18:13 97.0       


 


9/4/18 17:14 97.0       


 


9/4/18 17:09  135  112/61    


 


9/4/18 16:00  100      


 


9/4/18 16:00 97.0 81 20 112/61 (78) 96   





 97.0       


 


9/4/18 15:12  95  121/68    


 


9/4/18 13:41 207.7 86 20  98   


 


9/4/18 12:00 97.9 79 20 112/61 (78) 95   





 97.9       


 


9/4/18 12:00  85      


 


9/4/18 09:26 97 87 16 104/76 100 Nasal Cannula 3 





 97.0       


 


9/4/18 09:20  90 18 114/74 100 Nasal Cannula 3 


 


9/4/18 09:15 207.5 98 22  99   


 


9/4/18 09:10  94 17 95/69 100 Simple Mask 6 


 


9/4/18 09:05  97 16 97/64 100 Simple Mask 6 


 


9/4/18 09:00      Nasal Cannula 2.0 


 


9/4/18 09:00  93 13 99/66 100 Simple Mask 6 


 


9/4/18 09:00  87  104/76    


 


9/4/18 08:58 97 95 14 96/63 100 Simple Mask 6 





 97.0       


 


9/4/18 08:00  146      


 


9/4/18 07:50  150  112/74    


 


9/4/18 04:00 97.2 94 22 129/65 (86) 95   





 97.2       


 


9/4/18 04:00  106      


 


9/4/18 02:08  143  121/65    


 


9/4/18 00:00  105      


 


9/4/18 00:00 97.5 109 24 112/58 (76) 96   





 97.5       

















Intake and Output  


 


 9/3/18 9/4/18





 19:00 07:00


 


Intake Total 1499.249 ml 


 


Output Total 1000 ml 750 ml


 


Balance 499.249 ml -750 ml


 


  


 


Intake Oral 340 ml 


 


IV Total 1159.249 ml 


 


Output Urine Total 1000 ml 750 ml


 


# Voids  3








Laboratory Tests


9/4/18 04:01: 


White Blood Count 7.8, Red Blood Count 3.88L, Hemoglobin 10.6L, Hematocrit 32.3L

, Mean Corpuscular Volume 83, Mean Corpuscular Hemoglobin 27.3, Mean 

Corpuscular Hemoglobin Concent 32.7, Red Cell Distribution Width 12.8, Platelet 

Count 203, Mean Platelet Volume 6.0L, Neutrophils (%) (Auto) 72.7, Lymphocytes (

%) (Auto) 8.5L, Monocytes (%) (Auto) 15.0H, Eosinophils (%) (Auto) 2.8, 

Basophils (%) (Auto) 1.1, Prothrombin Time 12.7H, Prothromb Time International 

Ratio 1.2H, Activated Partial Thromboplast Time 31, Sodium Level 141, Potassium 

Level 4.0, Chloride Level 108H, Carbon Dioxide Level 30, Anion Gap 3L, Blood 

Urea Nitrogen 3L, Creatinine 0.8, Estimat Glomerular Filtration Rate > 60, 

Glucose Level 107H, Calcium Level 8.6, Total Bilirubin 0.6, Aspartate Amino 

Transf (AST/SGOT) 35, Alanine Aminotransferase (ALT/SGPT) 30, Alkaline 

Phosphatase 138H, Total Protein 5.8L, Albumin 2.3L, Globulin 3.5, Albumin/

Globulin Ratio 0.7L


Height (Feet):  5


Height (Inches):  9.00


Weight (Pounds):  150


Objective


WDWN


NCAT


supple


CTA


RRR


Abd soft NT ND


no edema


nonfocal











Clara Lenz MD Sep 4, 2018 22:17

## 2018-09-04 NOTE — PRE-PROCEDURE NOTE/ATTESTATION
Pre-Procedure Note/Attestation


Complete Prior to Procedure


Planned Procedure:  not applicable


Procedure Narrative:


ercp





Indications for Procedure


Pre-Operative Diagnosis:


cbd stones





Attestation


I attest that I discussed the nature of the procedure; its benefits; risks and 

complications; and alternatives (and the risks and benefits of such alternatives

), prior to the procedure, with the patient (or the patient's legal 

representative).





I attest that, if there was a reasonable possibility of needing a blood 

transfusion, the patient (or the patient's legal representative) was given the 

[facility] standardized written summary, pursuant 

to the Andres Seema Blood Safety Act ([place] Health and Safety Code # 1645, as 

amended).





I attest that I re-evaluated the patient just prior to the surgery and that 

there has been no change in the patient's H&P, except as documented below:











Jose Akbar MD Sep 4, 2018 08:19

## 2018-09-04 NOTE — PROCEDURE NOTE
DATE OF PROCEDURE:  09/04/2018



SURGEON:  Jose Akbar M.D.



ANESTHESIOLOGIST:  Dr. Puentes.



REFERRING PHYSICIAN:  Clara Lenz M.D.



PROCEDURE:  ERCP with sphincterotomy and stone removal.



ANESTHESIA:  Per Dr. Puentes.



INSTRUMENT:  Olympus ERCP scope.



INDICATION:  Common bile duct stone.



The procedure, risks, benefits, and possible consequences, including

hemorrhage, aspiration, perforation and infection, and alternative

treatments, were explained to the patient/legal guardian by Dr. Jose Akbar and the patient/legal guardian understood and accepted these

risks.



DESCRIPTION OF PROCEDURE:  After informed consent was obtained and the

patient was adequately sedated, Olympus ERCP scope was advanced from the

mouth into the second portion of the duodenum.  Using a sphincterotome,

common bile duct was selectively cannulated.  Initial cholangiogram showed

2 stones in the common bile duct.  Common bile duct was mildly dilated.



Then over a guidewire using a sphincterotome, 95% sphincterotomy was

successfully performed.  Then over a guidewire, a balloon was introduced

through the common bile duct and the duct was swept multiple times.  Two

stones were removed.  One of them was large and measured roughly about

maybe 8 mm and the other one was smaller at 5 mm.  Both were dark color,

black color-looking.



After the stones were removed, we performed balloon occlusion

cholangiogram.  There was no further filling defect in the common bile

duct.  Cystic duct was not filled.  The flow of contrast from common bile

duct to the duodenum was excellent, so no stent was placed.  The patient

tolerated the procedure very well without any complication.



SUMMARY OF FINDINGS:

1. Choledocholithiasis.

2. Status post ERCP with sphincterotomy and stone removal.



RECOMMENDATIONS:  Follow laboratories.  Follow surgical recommendation for

cholecystectomy.  Advanced diet as tolerated.



I want to thank Dr. Lenz for this kind referral.









  ______________________________________________

  Jose Akbar M.D.





DR:  Swetha

D:  09/04/2018 09:03

T:  09/04/2018 10:45

JOB#:  [id number]

CC:  Clara Lenz M.D.; Fax#:  [phone]

## 2018-09-04 NOTE — IMMEDIATE POST-OP EVALUATION
Immediate Post-Op Evalulation


Immediate Post-Op Evalulation


Procedure:  ERCP


Date of Evaluation:  Sep 4, 2018


Time of Evaluation:  09:14


IV Fluids:  250


Blood Products:  none


Estimated Blood Loss:  min


Urinary Output:  none


Blood Pressure Systolic:  95


Blood Pressure Diastolic:  67


Pulse Rate:  98


Respiratory Rate:  22


O2 Sat by Pulse Oximetry:  99


Temperature (Fahrenheit):  97.5


Pain Score (1-10):  2


Nausea:  No


Vomiting:  No


Complications


none


Patient Status:  reacts, patent, none


Hydration Status:  adequate











Rod Puentes MD Sep 4, 2018 09:15

## 2018-09-04 NOTE — 48 HOUR POST ANESTHESIA EVAL
Post Anesthesia Evaluation


Procedure:  ERCP


Date of Evaluation:  Sep 4, 2018


Time of Evaluation:  13:40


Blood Pressure Systolic:  121


0:  68


Pulse Rate:  86


Respiratory Rate:  20


Temperature (Fahrenheit):  97.6


O2 Sat by Pulse Oximetry:  98


Airway:  patent


Nausea:  No


Vomiting:  No


Pain Intensity:  2


Hydration Status:  adequate


Cardiopulmonary Status:


stable


Mental Status/LOC:  patient returned to baseline


Follow-up Care/Observations:


n/a


Post-Anesthesia Complications:


none


Follow-up care needed:  N/A











Rod Puentes MD Sep 4, 2018 13:41

## 2018-09-04 NOTE — ANETHESIA PREOPERATIVE EVAL
Anesthesia Pre-op PMH/ROS


General


Date of Evaluation:  Sep 4, 2018


Time of Evaluation:  12:50


Anesthesiologist:  Dhaval


ASA Score:  ASA 3


Mallampati Score


Class I : Soft palate, uvula, fauces, pillars visible


Class II: Soft palate, uvula, fauces visible


Class III: Soft palate, base of uvula visible


Class IV: Only hard plate visible


Mallampati Classification:  Class II


Surgeon:  Rosy


Diagnosis:  Symptomatic cholelithiasis


Surgical Procedure:  Laparoscopic cholecystectomy


Anesthesia History:  none


Social History:  smoking - h/o


Family History:  no anesthesia problems


Allergies:  


Coded Allergies:  


     AMPICILLIN (Verified  Allergy, Unknown, 8/3/18)


Uncoded Allergies:  


     PENICILLIN (Allergy, Unknown, 8/3/18)





Past Medical History


Cardiovascular:  Reports: HTN, CAD, arrhythmia; 


   Denies: MI, valve dz, other


Pulmonary:  Reports: COPD; 


   Denies: asthma, CESAR, other


Gastrointestinal/Genitourinary:  Reports: GERD; 


   Denies: CRI, ESRD, other


Neurologic/Psychiatric:  Reports: depression/anxiety; 


   Denies: dementia, CVA, TIA, other


Endocrine:  Denies: DM, hypothyroidism, steroids, other


HEENT:  Denies: cataract (L), cataract (R), glaucoma, [place] (L), [place] (R), other


Hematology/Immune:  Denies: anemia, DVT, bleeding disorder, other


Musculoskeletal/Integumentary:  Reports: DJD; 


   Denies: OA, RA, DDD, edema, other


PMH Narrative:


as above


PSxH Narrative:


ERCP





Anesthesia Pre-op Phys. Exam


Physician Exam





Last Vital Signs








  Date Time  Temp Pulse Resp B/P (MAP) Pulse Ox O2 Delivery O2 Flow Rate FiO2


 


9/4/18 18:13 97.0       


 


9/4/18 17:09  135  112/61    


 


9/4/18 16:00   20  96   


 


9/4/18 09:26      Nasal Cannula 3 








Constitutional:  NAD


Neurologic:  CN 2-12 intact


Cardiovascular:  RRR, no M/R/G


Respiratory:  CTA


Gastrointestinal:  S/NT/ND





Airway Exam


Mallampati Score:  Class II


MO:  limited


Neck:  stiff


ROM:  limited


Teeth:  missing


Dentures:  no upper, no lower





Anesthesia Pre-op A/P


Labs





Hematology








Test


  9/4/18


04:01


 


White Blood Count


  7.8 K/UL


(4.8-10.8)


 


Red Blood Count


  3.88 M/UL


(4.70-6.10)  L


 


Hemoglobin


  10.6 G/DL


(14.2-18.0)  L


 


Hematocrit


  32.3 %


(42.0-52.0)  L


 


Mean Corpuscular Volume 83 FL (80-99)  


 


Mean Corpuscular Hemoglobin


  27.3 PG


(27.0-31.0)


 


Mean Corpuscular Hemoglobin


Concent 32.7 G/DL


(32.0-36.0)


 


Red Cell Distribution Width


  12.8 %


(11.6-14.8)


 


Platelet Count


  203 K/UL


(150-450)


 


Mean Platelet Volume


  6.0 FL


(6.5-10.1)  L


 


Neutrophils (%) (Auto)


  72.7 %


(45.0-75.0)


 


Lymphocytes (%) (Auto)


  8.5 %


(20.0-45.0)  L


 


Monocytes (%) (Auto)


  15.0 %


(1.0-10.0)  H


 


Eosinophils (%) (Auto)


  2.8 %


(0.0-3.0)


 


Basophils (%) (Auto)


  1.1 %


(0.0-2.0)








Coagulation








Test


  9/4/18


04:01


 


Prothrombin Time


  12.7 SEC


(9.30-11.50)  H


 


Prothromb Time International


Ratio 1.2 (0.9-1.1)


H


 


Activated Partial


Thromboplast Time 31 SEC (23-33)


 








Chemistry








Test


  9/4/18


04:01


 


Sodium Level


  141 MMOL/L


(136-145)


 


Potassium Level


  4.0 MMOL/L


(3.5-5.1)


 


Chloride Level


  108 MMOL/L


()  H


 


Carbon Dioxide Level


  30 MMOL/L


(21-32)


 


Anion Gap


  3 mmol/L


(5-15)  L


 


Blood Urea Nitrogen


  3 mg/dL (7-18)


L


 


Creatinine


  0.8 MG/DL


(0.55-1.30)


 


Estimat Glomerular Filtration


Rate > 60 mL/min


(>60)


 


Glucose Level


  107 MG/DL


()  H


 


Calcium Level


  8.6 MG/DL


(8.5-10.1)


 


Total Bilirubin


  0.6 MG/DL


(0.2-1.0)


 


Aspartate Amino Transf


(AST/SGOT) 35 U/L (15-37)


 


 


Alanine Aminotransferase


(ALT/SGPT) 30 U/L (12-78)


 


 


Alkaline Phosphatase


  138 U/L


()  H


 


Total Protein


  5.8 G/DL


(6.4-8.2)  L


 


Albumin


  2.3 G/DL


(3.4-5.0)  L


 


Globulin 3.5 g/dL  


 


Albumin/Globulin Ratio


  0.7 (1.0-2.7)


L











Risk Assessment & Plan


Assessment:


ASA 3


Plan:


GA with ETT


Status Change Before Surgery:  No





Pre-Antibiotics


Drug:  as scheduled











Rod Puentes MD Sep 4, 2018 19:10

## 2018-09-04 NOTE — CARDIOLOGY PROGRESS NOTE
Assessment/Plan


Assessment/Plan


perm afib now with rvr 


acute derrick


jorge l with hs of bifascicular block 


hs of cm with subsequent resolution 


copd 


thrombocytopenia 


cad s/p pci lad rca cedrs 3/2018 


chronic chf 


MR not confirmed on echo here 8/2018








no chf sx 


he has noted to have resolution of his cm in 6/2018[facility] confirmed on echo 

here tee;erithis month 


at tiem still tachy on bid of coreg and bid of cardizem will increase frequency 

of po Cardizem 


MR not confirmed on echo here ealier in august 


no sx o acs will repeat trop in light of tachy 


needs to be on ecotrin and plavix since his stents are almost 6mon ago 

additionally need anticoagulation for stroke prevention in afib 


however risk of bleeding with triple therapy will need to be on Ecotrin adn 

anticoagulation  


now pro ercp 


await surgery 


will try to control hr in the low 100s if possible





Subjective


Cardiovascular:  Denies: chest pain, lightheadedness, palpitations


Respiratory:  Denies: shortness of breath


Gastrointestinal/Abdominal:  Denies: abdominal pain


Genitourinary:  Denies: burning


Subjective


wants to go home





Objective





Last 24 Hour Vital Signs








  Date Time  Temp Pulse Resp B/P (MAP) Pulse Ox O2 Delivery O2 Flow Rate FiO2


 


9/4/18 12:00 97.9 79 20 112/61 (78) 95   





 97.9       


 


9/4/18 09:26 97 87 16 104/76 100 Nasal Cannula 3 





 97.0       


 


9/4/18 09:20  90 18 114/74 100 Nasal Cannula 3 


 


9/4/18 09:15 207.5 98 22  99   


 


9/4/18 09:10  94 17 95/69 100 Simple Mask 6 


 


9/4/18 09:05  97 16 97/64 100 Simple Mask 6 


 


9/4/18 09:00      Nasal Cannula 2.0 


 


9/4/18 09:00  93 13 99/66 100 Simple Mask 6 


 


9/4/18 09:00  87  104/76    


 


9/4/18 08:58 97 95 14 96/63 100 Simple Mask 6 





 97.0       


 


9/4/18 07:50  150  112/74    


 


9/4/18 04:00 97.2 94 22 129/65 (86) 95   





 97.2       


 


9/4/18 04:00  106      


 


9/4/18 02:08  143  121/65    


 


9/4/18 00:00  105      


 


9/4/18 00:00 97.5 109 24 112/58 (76) 96   





 97.5       


 


9/3/18 21:00      Nasal Cannula 2.0 


 


9/3/18 21:00  96  100/70    


 


9/3/18 20:00  97      


 


9/3/18 20:00 97.9 101 24 100/70 (80) 94   





 97.9       


 


9/3/18 18:12  95  122/81    


 


9/3/18 16:00 98.1 88 24 122/81 (95) 95   





 98.1       


 


9/3/18 16:00  95      








General Appearance:  alert


Neck:  supple


Cardiovascular:  irregularly irregular


Respiratory/Chest:  lungs clear


Abdomen:  normal bowel sounds, non tender, soft


Extremities:  no swelling











Intake and Output  


 


 9/3/18 9/4/18





 19:00 07:00


 


Intake Total 1499.249 ml 


 


Output Total 1000 ml 750 ml


 


Balance 499.249 ml -750 ml


 


  


 


Intake Oral 340 ml 


 


IV Total 1159.249 ml 


 


Output Urine Total 1000 ml 750 ml


 


# Voids  3











Laboratory Tests








Test


  9/4/18


04:01


 


White Blood Count


  7.8 K/UL


(4.8-10.8)


 


Red Blood Count


  3.88 M/UL


(4.70-6.10)  L


 


Hemoglobin


  10.6 G/DL


(14.2-18.0)  L


 


Hematocrit


  32.3 %


(42.0-52.0)  L


 


Mean Corpuscular Volume 83 FL (80-99)  


 


Mean Corpuscular Hemoglobin


  27.3 PG


(27.0-31.0)


 


Mean Corpuscular Hemoglobin


Concent 32.7 G/DL


(32.0-36.0)


 


Red Cell Distribution Width


  12.8 %


(11.6-14.8)


 


Platelet Count


  203 K/UL


(150-450)


 


Mean Platelet Volume


  6.0 FL


(6.5-10.1)  L


 


Neutrophils (%) (Auto)


  72.7 %


(45.0-75.0)


 


Lymphocytes (%) (Auto)


  8.5 %


(20.0-45.0)  L


 


Monocytes (%) (Auto)


  15.0 %


(1.0-10.0)  H


 


Eosinophils (%) (Auto)


  2.8 %


(0.0-3.0)


 


Basophils (%) (Auto)


  1.1 %


(0.0-2.0)


 


Prothrombin Time


  12.7 SEC


(9.30-11.50)  H


 


Prothromb Time International


Ratio 1.2 (0.9-1.1)


H


 


Activated Partial


Thromboplast Time 31 SEC (23-33)


 


 


Sodium Level


  141 MMOL/L


(136-145)


 


Potassium Level


  4.0 MMOL/L


(3.5-5.1)


 


Chloride Level


  108 MMOL/L


()  H


 


Carbon Dioxide Level


  30 MMOL/L


(21-32)


 


Anion Gap


  3 mmol/L


(5-15)  L


 


Blood Urea Nitrogen


  3 mg/dL (7-18)


L


 


Creatinine


  0.8 MG/DL


(0.55-1.30)


 


Estimat Glomerular Filtration


Rate > 60 mL/min


(>60)


 


Glucose Level


  107 MG/DL


()  H


 


Calcium Level


  8.6 MG/DL


(8.5-10.1)


 


Total Bilirubin


  0.6 MG/DL


(0.2-1.0)


 


Aspartate Amino Transf


(AST/SGOT) 35 U/L (15-37)


 


 


Alanine Aminotransferase


(ALT/SGPT) 30 U/L (12-78)


 


 


Alkaline Phosphatase


  138 U/L


()  H


 


Total Protein


  5.8 G/DL


(6.4-8.2)  L


 


Albumin


  2.3 G/DL


(3.4-5.0)  L


 


Globulin 3.5 g/dL  


 


Albumin/Globulin Ratio


  0.7 (1.0-2.7)


L

















Zach Fonseca MD Sep 4, 2018 13:44

## 2018-09-04 NOTE — GENERAL PROGRESS NOTE
Assessment/Plan


Assessment/Plan


1. Pancytopenia, potentially secondary to liver disease, bilirubin elevated on 

admission. The patient had transaminitis on admission as well. MCV is elevated.

  


--> Hepatitis A and B are negative, Hep-C is positive, HIV negative. 


--> Ultrasound of the abdomen reviewed. No evidence of cirrhosis or 

splenomegaly 


--> We will continue to closely monitor.  


--> Monitor platelet count closely.


--> Counts improved, compared to admission, doesn't need a bone marrow biopsy


2. Anemia of chronic disease. Anemia panel has been reviewed. Will trend CBC 

daily. 


--> Hemoglobin goal is above 7.  


--> Current Hgb at 10.3, stable 


3. Anemia due to underlying congestive heart failure, decompensated state.


--> monitor fluid status closely, as per cards


4. Cholecystitis. ERCP planned for 9/4


--> results of ercp pending


5. Possible viral urinary tract infection.


6. Transaminitis, hepatic congestion questionable.


7. Deep venous thrombosis prophylaxis with heparin subcutaneous.








The time the note was entered does not necessarily correspond to the time the 

patient was seen.





Subjective


Constitutional:  Denies: no symptoms, chills, diaphoresis, fever, malaise, 

weakness, other


HEENT:  Denies: no symptoms, eye pain, blurred vision, tearing, double vision, 

ear pain, ear discharge, nose pain, nose congestion, throat pain, throat 

swelling, mouth pain, mouth swelling, other


Cardiovascular:  Denies: no symptoms, chest pain, edema, irregular heart rate, 

lightheadedness, palpitations, syncope, other


Respiratory:  Denies: no symptoms, cough, orthopnea, shortness of breath, SOB 

with excertion, SOB at rest, sputum, stridor, wheezing, other


Gastrointestinal/Abdominal:  Denies: no symptoms, abdomen distended, abdominal 

pain, black stools, tarry stools, blood in stool, constipated, diarrhea, 

difficulty swallowing, nausea, poor appetite, poor fluid intake, rectal bleeding

, vomiting, other


Genitourinary:  Denies: no symptoms, burning, discharge, frequency, flank pain, 

hematuria, incontinence, pain, urgency, other


Neurologic/Psychiatric:  Denies: no symptoms, anxiety, depressed, emotional 

problems, headache, numbness, paresthesia, pre-existing deficit, seizure, 

tingling, tremors, weakness, other


Endocrine:  Denies: no symptoms, excessive sweating, flushing, intolerance to 

cold, intolerance to heat, increased hunger, increased thirst, increased urine, 

unexplained weight gain, unexplained weight loss, other


Hematologic/Lymphatic:  Denies: no symptoms, anemia, easy bleeding, easy 

bruising, other


Allergies:  


Coded Allergies:  


     AMPICILLIN (Verified  Allergy, Unknown, 8/3/18)


Uncoded Allergies:  


     PENICILLIN (Allergy, Unknown, 8/3/18)


Subjective


Pt awake and agitated.  No acute events. Hr is up and down. Possible ERCP for 

today.





Objective





Last 24 Hour Vital Signs








  Date Time  Temp Pulse Resp B/P (MAP) Pulse Ox O2 Delivery O2 Flow Rate FiO2


 


9/4/18 04:00 97.2 94 22 129/65 (86) 95   





 97.2       


 


9/4/18 04:00  106      


 


9/4/18 02:08  143  121/65    


 


9/4/18 00:00  105      


 


9/4/18 00:00 97.5 109 24 112/58 (76) 96   





 97.5       


 


9/3/18 21:00      Nasal Cannula 2.0 


 


9/3/18 21:00  96  100/70    


 


9/3/18 20:00  97      


 


9/3/18 20:00 97.9 101 24 100/70 (80) 94   





 97.9       


 


9/3/18 18:12  95  122/81    


 


9/3/18 16:00 98.1 88 24 122/81 (95) 95   





 98.1       


 


9/3/18 16:00  95      


 


9/3/18 12:00 97.7 86 24 138/63 (88) 95   





 97.7       


 


9/3/18 12:00  154      


 


9/3/18 09:20  101  142/71    


 


9/3/18 09:20  101  142/71    


 


9/3/18 09:00      Room Air  


 


9/3/18 08:00 98.6 101 24 142/71 (94) 94   





 98.6       


 


9/3/18 08:00  95      

















Intake and Output  


 


 9/3/18 9/4/18





 19:00 07:00


 


Intake Total 1499.249 ml 


 


Output Total 1000 ml 


 


Balance 499.249 ml 


 


  


 


Intake Oral 340 ml 


 


IV Total 1159.249 ml 


 


Output Urine Total 1000 ml 








Laboratory Tests


9/3/18 12:00: 


Activated Partial Thromboplast Time 90H, Magnesium Level 1.7L, Thyroid 

Stimulating Hormone (TSH) 1.428


9/4/18 04:01: 


Activated Partial Thromboplast Time 31, White Blood Count 7.8, Red Blood Count 

3.88L, Hemoglobin 10.6L, Hematocrit 32.3L, Mean Corpuscular Volume 83, Mean 

Corpuscular Hemoglobin 27.3, Mean Corpuscular Hemoglobin Concent 32.7, Red Cell 

Distribution Width 12.8, Platelet Count 203, Mean Platelet Volume 6.0L, 

Neutrophils (%) (Auto) 72.7, Lymphocytes (%) (Auto) 8.5L, Monocytes (%) (Auto) 

15.0H, Eosinophils (%) (Auto) 2.8, Basophils (%) (Auto) 1.1, Prothrombin Time 

12.7H, Prothromb Time International Ratio 1.2H, Sodium Level 141, Potassium 

Level 4.0, Chloride Level 108H, Carbon Dioxide Level 30, Anion Gap 3L, Blood 

Urea Nitrogen 3L, Creatinine 0.8, Estimat Glomerular Filtration Rate > 60, 

Glucose Level 107H, Calcium Level 8.6, Total Bilirubin 0.6, Aspartate Amino 

Transf (AST/SGOT) 35, Alanine Aminotransferase (ALT/SGPT) 30, Alkaline 

Phosphatase 138H, Total Protein 5.8L, Albumin 2.3L, Globulin 3.5, Albumin/

Globulin Ratio 0.7L


Height (Feet):  5


Height (Inches):  9.00


Weight (Pounds):  150


General Appearance:  no apparent distress


EENT:  TMs normal


Neck:  supple


Cardiovascular:  regular rhythm


Respiratory/Chest:  normal breath sounds


Abdomen:  soft


Extremities:  non-tender


Edema:  1+ Leg (L), 1+ Leg (R)


Edema:  mild edema


Neurologic:  alert


Skin:  warm/dry











Jack Benitez MD Sep 4, 2018 06:28

## 2018-09-04 NOTE — GENERAL SURGERY PROGRESS NOTE
General Surgery-Progress Note


Subjective


Additional Comments


s/p ercp today.  two stones removed.  doing well.





Objective





Last 24 Hour Vital Signs








  Date Time  Temp Pulse Resp B/P (MAP) Pulse Ox O2 Delivery O2 Flow Rate FiO2


 


9/4/18 13:41 207.7 86 20  98   


 


9/4/18 12:00 97.9 79 20 112/61 (78) 95   





 97.9       


 


9/4/18 09:26 97 87 16 104/76 100 Nasal Cannula 3 





 97.0       


 


9/4/18 09:20  90 18 114/74 100 Nasal Cannula 3 


 


9/4/18 09:15 207.5 98 22  99   


 


9/4/18 09:10  94 17 95/69 100 Simple Mask 6 


 


9/4/18 09:05  97 16 97/64 100 Simple Mask 6 


 


9/4/18 09:00      Nasal Cannula 2.0 


 


9/4/18 09:00  93 13 99/66 100 Simple Mask 6 


 


9/4/18 09:00  87  104/76    


 


9/4/18 08:58 97 95 14 96/63 100 Simple Mask 6 





 97.0       


 


9/4/18 07:50  150  112/74    


 


9/4/18 04:00 97.2 94 22 129/65 (86) 95   





 97.2       


 


9/4/18 04:00  106      


 


9/4/18 02:08  143  121/65    


 


9/4/18 00:00  105      


 


9/4/18 00:00 97.5 109 24 112/58 (76) 96   





 97.5       


 


9/3/18 21:00      Nasal Cannula 2.0 


 


9/3/18 21:00  96  100/70    


 


9/3/18 20:00  97      


 


9/3/18 20:00 97.9 101 24 100/70 (80) 94   





 97.9       


 


9/3/18 18:12  95  122/81    


 


9/3/18 16:00 98.1 88 24 122/81 (95) 95   





 98.1       


 


9/3/18 16:00  95      








I&O











Intake and Output  


 


 9/3/18 9/4/18





 19:00 07:00


 


Intake Total 1499.249 ml 


 


Output Total 1000 ml 750 ml


 


Balance 499.249 ml -750 ml


 


  


 


Intake Oral 340 ml 


 


IV Total 1159.249 ml 


 


Output Urine Total 1000 ml 750 ml


 


# Voids  3








Drains:  none


Cardiovascular:  RSR


Respiratory:  clear


Abdomen:  soft, flat, non-tender, present bowel sounds


Extremities:  no edema, no tenderness, no cyanosis





Laboratory Tests








Test


  9/4/18


04:01


 


White Blood Count


  7.8 K/UL


(4.8-10.8)


 


Red Blood Count


  3.88 M/UL


(4.70-6.10)  L


 


Hemoglobin


  10.6 G/DL


(14.2-18.0)  L


 


Hematocrit


  32.3 %


(42.0-52.0)  L


 


Mean Corpuscular Volume 83 FL (80-99)  


 


Mean Corpuscular Hemoglobin


  27.3 PG


(27.0-31.0)


 


Mean Corpuscular Hemoglobin


Concent 32.7 G/DL


(32.0-36.0)


 


Red Cell Distribution Width


  12.8 %


(11.6-14.8)


 


Platelet Count


  203 K/UL


(150-450)


 


Mean Platelet Volume


  6.0 FL


(6.5-10.1)  L


 


Neutrophils (%) (Auto)


  72.7 %


(45.0-75.0)


 


Lymphocytes (%) (Auto)


  8.5 %


(20.0-45.0)  L


 


Monocytes (%) (Auto)


  15.0 %


(1.0-10.0)  H


 


Eosinophils (%) (Auto)


  2.8 %


(0.0-3.0)


 


Basophils (%) (Auto)


  1.1 %


(0.0-2.0)


 


Prothrombin Time


  12.7 SEC


(9.30-11.50)  H


 


Prothromb Time International


Ratio 1.2 (0.9-1.1)


H


 


Activated Partial


Thromboplast Time 31 SEC (23-33)


 


 


Sodium Level


  141 MMOL/L


(136-145)


 


Potassium Level


  4.0 MMOL/L


(3.5-5.1)


 


Chloride Level


  108 MMOL/L


()  H


 


Carbon Dioxide Level


  30 MMOL/L


(21-32)


 


Anion Gap


  3 mmol/L


(5-15)  L


 


Blood Urea Nitrogen


  3 mg/dL (7-18)


L


 


Creatinine


  0.8 MG/DL


(0.55-1.30)


 


Estimat Glomerular Filtration


Rate > 60 mL/min


(>60)


 


Glucose Level


  107 MG/DL


()  H


 


Calcium Level


  8.6 MG/DL


(8.5-10.1)


 


Total Bilirubin


  0.6 MG/DL


(0.2-1.0)


 


Aspartate Amino Transf


(AST/SGOT) 35 U/L (15-37)


 


 


Alanine Aminotransferase


(ALT/SGPT) 30 U/L (12-78)


 


 


Alkaline Phosphatase


  138 U/L


()  H


 


Total Protein


  5.8 G/DL


(6.4-8.2)  L


 


Albumin


  2.3 G/DL


(3.4-5.0)  L


 


Globulin 3.5 g/dL  


 


Albumin/Globulin Ratio


  0.7 (1.0-2.7)


L











Plan


Problems:  


(1) Choledocholithiasis


Assessment & Plan:  labs trending down.  


GI input appreciated


defer to GI for ERCP





(2) Abdominal pain


(3) Cholecystitis


Assessment & Plan:  s/p ERCP with stone extraction


plan for lap derrick tomorrow


discussed with patient.  consent obtained


OR tomorrow at 10AM


 


NPO p mn


hold heparin gtt


okay for ASA 81mg 


d/c planning for after surgery 


will follow with recs.  thank you for this consultation. 














Timothy Heath Sep 4, 2018 14:40

## 2018-09-04 NOTE — GENERAL PROGRESS NOTE
Assessment/Plan


Status:  not improved


Assessment/Plan


1) Acute cholecystitis


Choledocholithiasis


r/o Sepsis from a biliary source


-Surgery consulted- recs appreciated


-elevated bilirubin/transaminitis- GI consulted- recs appreciated


-continue IV antibiotics- ciprofloxacin, metronidazole


-check cultures- blood, urine, lactic acid- negative to date


- EGD/ERCP  today- f/u report








2) Chronic systolic and diastolic heart failure


3) Atrial fibrillation with rapid ventricular response


4) CAD s/p PTCA/stent 6 months ago


-Telemetry monitoring


-continue carvedilol, hydralazine, statin


-hold apixaban given surgery


-Cardiology consulted- recs appreciated- started diltiazem- increased this AM 

given elevated HR


-Continue heparin drip, ASA given recent stent and atrial fibrillation


-replete K, Mg PRN





5) DARIAN- improving near baseline


-IVF's; likely hypovolemic; doubt sepsis


-avoid nephrotoxins/renally dose meds


-will follow closely





6) Pancytopenia


-Hematology recs appreciated


-monitor for now





DVT Prophylaxis: scd's, IV heparin





Code status: full





Hospital Classification declaration: Based on this initial evaluation, and 

depending on the patient's clinical course, I anticipate that this patient will 

require hospitalization for 2-3 days. 





Disposition: Once the patient is stable to leave the hospital, I anticipate the 

patient will likely be discharged to the following environment:SNF- comes from 

[facility]





I spent 45 minutes on this patient's case, and 23 minutes was dedicated to 

counseling and/or care coordination. 





Time of note may not reflect time of encounter.





Subjective


Date patient seen:  Sep 4, 2018


Time patient seen:  14:32


ROS Limited/Unobtainable:  Yes


Constitutional:  Reports: no symptoms, weakness


HEENT:  Reports: no symptoms


Cardiovascular:  Reports: irregular heart rate, palpitations


Respiratory:  Reports: no symptoms


Gastrointestinal/Abdominal:  Reports: no symptoms, nausea


Genitourinary:  Reports: no symptoms


Neurologic/Psychiatric:  Reports: no symptoms


Endocrine:  Reports: no symptoms


Hematologic/Lymphatic:  Reports: no symptoms


Allergies:  


Coded Allergies:  


     AMPICILLIN (Verified  Allergy, Unknown, 8/3/18)


Uncoded Allergies:  


     PENICILLIN (Allergy, Unknown, 8/3/18)


All Systems:  reviewed and negative except above


Subjective


Events of overnight reviewed


Chart reviewed by me


Patient with elevated HR this AM


Resting s/p ERCP





Objective





Last 24 Hour Vital Signs








  Date Time  Temp Pulse Resp B/P (MAP) Pulse Ox O2 Delivery O2 Flow Rate FiO2


 


9/4/18 13:41 207.7 86 20  98   


 


9/4/18 12:00 97.9 79 20 112/61 (78) 95   





 97.9       


 


9/4/18 09:26 97 87 16 104/76 100 Nasal Cannula 3 





 97.0       


 


9/4/18 09:20  90 18 114/74 100 Nasal Cannula 3 


 


9/4/18 09:15 207.5 98 22  99   


 


9/4/18 09:10  94 17 95/69 100 Simple Mask 6 


 


9/4/18 09:05  97 16 97/64 100 Simple Mask 6 


 


9/4/18 09:00      Nasal Cannula 2.0 


 


9/4/18 09:00  93 13 99/66 100 Simple Mask 6 


 


9/4/18 09:00  87  104/76    


 


9/4/18 08:58 97 95 14 96/63 100 Simple Mask 6 





 97.0       


 


9/4/18 07:50  150  112/74    


 


9/4/18 04:00 97.2 94 22 129/65 (86) 95   





 97.2       


 


9/4/18 04:00  106      


 


9/4/18 02:08  143  121/65    


 


9/4/18 00:00  105      


 


9/4/18 00:00 97.5 109 24 112/58 (76) 96   





 97.5       


 


9/3/18 21:00      Nasal Cannula 2.0 


 


9/3/18 21:00  96  100/70    


 


9/3/18 20:00  97      


 


9/3/18 20:00 97.9 101 24 100/70 (80) 94   





 97.9       


 


9/3/18 18:12  95  122/81    


 


9/3/18 16:00 98.1 88 24 122/81 (95) 95   





 98.1       


 


9/3/18 16:00  95      

















Intake and Output  


 


 9/3/18 9/4/18





 19:00 07:00


 


Intake Total 1499.249 ml 


 


Output Total 1000 ml 750 ml


 


Balance 499.249 ml -750 ml


 


  


 


Intake Oral 340 ml 


 


IV Total 1159.249 ml 


 


Output Urine Total 1000 ml 750 ml


 


# Voids  3








Laboratory Tests


9/4/18 04:01: 


White Blood Count 7.8, Red Blood Count 3.88L, Hemoglobin 10.6L, Hematocrit 32.3L

, Mean Corpuscular Volume 83, Mean Corpuscular Hemoglobin 27.3, Mean 

Corpuscular Hemoglobin Concent 32.7, Red Cell Distribution Width 12.8, Platelet 

Count 203, Mean Platelet Volume 6.0L, Neutrophils (%) (Auto) 72.7, Lymphocytes (

%) (Auto) 8.5L, Monocytes (%) (Auto) 15.0H, Eosinophils (%) (Auto) 2.8, 

Basophils (%) (Auto) 1.1, Prothrombin Time 12.7H, Prothromb Time International 

Ratio 1.2H, Activated Partial Thromboplast Time 31, Sodium Level 141, Potassium 

Level 4.0, Chloride Level 108H, Carbon Dioxide Level 30, Anion Gap 3L, Blood 

Urea Nitrogen 3L, Creatinine 0.8, Estimat Glomerular Filtration Rate > 60, 

Glucose Level 107H, Calcium Level 8.6, Total Bilirubin 0.6, Aspartate Amino 

Transf (AST/SGOT) 35, Alanine Aminotransferase (ALT/SGPT) 30, Alkaline 

Phosphatase 138H, Total Protein 5.8L, Albumin 2.3L, Globulin 3.5, Albumin/

Globulin Ratio 0.7L


Height (Feet):  5


Height (Inches):  9.00


Weight (Pounds):  150


General Appearance:  no apparent distress, lethargic, confused


EENT:  PERRL/EOMI, normal ENT inspection, TMs normal


Neck:  non-tender, supple


Cardiovascular:  normal peripheral pulses, tachycardia, irregularly irregular


Respiratory/Chest:  chest wall non-tender, decreased breath sounds


Abdomen:  normal bowel sounds, non tender, soft


Pelvis:  normal external exam


Extremities:  normal range of motion, non-tender


Edema:  trace edema


Neurologic:  disoriented


Skin:  normal pigmentation


Lymphatic:  normal anterior cervical (L), normal anterior cervical (R)











Gino Stover MD Sep 4, 2018 14:30

## 2018-09-04 NOTE — GENERAL PROGRESS NOTE
Assessment/Plan


Status:  stable


Assessment/Plan


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


MDD





increase seroquel 50 mg tid


the pt lacks capacity to make decision about placement.





Subjective


Date patient seen:  Sep 4, 2018


Neurologic/Psychiatric:  Reports: anxiety, depressed, emotional problems


Allergies:  


Coded Allergies:  


     AMPICILLIN (Verified  Allergy, Unknown, 8/3/18)


Uncoded Allergies:  


     PENICILLIN (Allergy, Unknown, 8/3/18)





Objective





Last 24 Hour Vital Signs








  Date Time  Temp Pulse Resp B/P (MAP) Pulse Ox O2 Delivery O2 Flow Rate FiO2


 


9/4/18 13:41 207.7 86 20  98   


 


9/4/18 12:00 97.9 79 20 112/61 (78) 95   





 97.9       


 


9/4/18 09:26 97 87 16 104/76 100 Nasal Cannula 3 





 97.0       


 


9/4/18 09:20  90 18 114/74 100 Nasal Cannula 3 


 


9/4/18 09:15 207.5 98 22  99   


 


9/4/18 09:10  94 17 95/69 100 Simple Mask 6 


 


9/4/18 09:05  97 16 97/64 100 Simple Mask 6 


 


9/4/18 09:00      Nasal Cannula 2.0 


 


9/4/18 09:00  93 13 99/66 100 Simple Mask 6 


 


9/4/18 09:00  87  104/76    


 


9/4/18 08:58 97 95 14 96/63 100 Simple Mask 6 





 97.0       


 


9/4/18 07:50  150  112/74    


 


9/4/18 04:00 97.2 94 22 129/65 (86) 95   





 97.2       


 


9/4/18 04:00  106      


 


9/4/18 02:08  143  121/65    


 


9/4/18 00:00  105      


 


9/4/18 00:00 97.5 109 24 112/58 (76) 96   





 97.5       


 


9/3/18 21:00      Nasal Cannula 2.0 


 


9/3/18 21:00  96  100/70    


 


9/3/18 20:00  97      


 


9/3/18 20:00 97.9 101 24 100/70 (80) 94   





 97.9       


 


9/3/18 18:12  95  122/81    


 


9/3/18 16:00 98.1 88 24 122/81 (95) 95   





 98.1       


 


9/3/18 16:00  95      

















Intake and Output  


 


 9/3/18 9/4/18





 19:00 07:00


 


Intake Total 1499.249 ml 


 


Output Total 1000 ml 750 ml


 


Balance 499.249 ml -750 ml


 


  


 


Intake Oral 340 ml 


 


IV Total 1159.249 ml 


 


Output Urine Total 1000 ml 750 ml


 


# Voids  3








Laboratory Tests


9/4/18 04:01: 


White Blood Count 7.8, Red Blood Count 3.88L, Hemoglobin 10.6L, Hematocrit 32.3L

, Mean Corpuscular Volume 83, Mean Corpuscular Hemoglobin 27.3, Mean 

Corpuscular Hemoglobin Concent 32.7, Red Cell Distribution Width 12.8, Platelet 

Count 203, Mean Platelet Volume 6.0L, Neutrophils (%) (Auto) 72.7, Lymphocytes (

%) (Auto) 8.5L, Monocytes (%) (Auto) 15.0H, Eosinophils (%) (Auto) 2.8, 

Basophils (%) (Auto) 1.1, Prothrombin Time 12.7H, Prothromb Time International 

Ratio 1.2H, Activated Partial Thromboplast Time 31, Sodium Level 141, Potassium 

Level 4.0, Chloride Level 108H, Carbon Dioxide Level 30, Anion Gap 3L, Blood 

Urea Nitrogen 3L, Creatinine 0.8, Estimat Glomerular Filtration Rate > 60, 

Glucose Level 107H, Calcium Level 8.6, Total Bilirubin 0.6, Aspartate Amino 

Transf (AST/SGOT) 35, Alanine Aminotransferase (ALT/SGPT) 30, Alkaline 

Phosphatase 138H, Total Protein 5.8L, Albumin 2.3L, Globulin 3.5, Albumin/

Globulin Ratio 0.7L


Height (Feet):  5


Height (Inches):  9.00


Weight (Pounds):  150


General Appearance:  no apparent distress, alert, confused, agitated











Rodri Medina MD Sep 4, 2018 14:18

## 2018-09-04 NOTE — ENDOSCOPY PROCEDURE NOTE
Endoscopy Procedure Note


General


Indication for Procedure:  choledocholithiasis


Procedures Performed:  ERCP


Operative Findings/Diagnosis:  same


Specimen:  none


Pt Tolerated Procedure Well:  Yes


Estimated Blood Loss:  none





Anesthesia


Anesthesiologist:  jennifer


Anesthesia:  MAC





Inserted Devices


Implant(s) used?:  No





GI Core Measures


50 yrs or older w/o bx or poly:  Not Applicable


10yrs. F/U not recommended:  Not Applicable











Jose Akbar MD Sep 4, 2018 09:06

## 2018-09-04 NOTE — DIAGNOSTIC IMAGING REPORT
Indication: Abdominal pain, intraoperative

 

Technique: Intraoperative images during ERCP, sphincterotomy, stone removal by Dr. Akbar

 

Total fluoroscopy time 1.4 minutes.

Total dose area product  303  dGycm2

Number of images: 7

 

Comparison: none

 

Findings: Intraoperative images demonstrate opacification of mildly dilated extra

hepatic bile ducts, containing multiple filling defects. Subsequent images document

placement of stone extraction balloon

 

Impression: Intraoperative imaging, as described

## 2018-09-05 NOTE — PRE-PROCEDURE NOTE/ATTESTATION
Pre-Procedure Note/Attestation


Complete Prior to Procedure


Planned Procedure:  not applicable


Procedure Narrative:


laparoscopic cholecystectomy, possible open





Indications for Procedure


Pre-Operative Diagnosis:


acute cholecystitis, choledocholithiasis





Attestation


I attest that I discussed the nature of the procedure; its benefits; risks and 

complications; and alternatives (and the risks and benefits of such alternatives

), prior to the procedure, with the patient (or the patient's legal 

representative).





I attest that, if there was a reasonable possibility of needing a blood 

transfusion, the patient (or the patient's legal representative) was given the 

[facility] standardized written summary, pursuant 

to the [place] Blood Safety Act ([place] Health and Safety Code # 1645, as 

amended).





I attest that I re-evaluated the patient just prior to the surgery and that 

there has been no change in the patient's H&P, except as documented below:











Timothy Heath Sep 5, 2018 10:15

## 2018-09-05 NOTE — GENERAL PROGRESS NOTE
Assessment/Plan


Status:  progressing


Assessment/Plan


1) Acute cholecystitis


Choledocholithiasis


r/o Sepsis from a biliary source


-Surgery consulted- recs appreciated


-elevated bilirubin/transaminitis- GI consulted- recs appreciated


-continue IV antibiotics- ciprofloxacin, metronidazole


-check cultures- blood, urine, lactic acid- negative to date


- s/p EGD/ERCP 9/4 - 2 stones extracted


-s/p laparoscopic cholecystectomy- follow postop





2) Chronic systolic and diastolic heart failure


3) Atrial fibrillation with rapid ventricular response


4) CAD s/p PTCA/stent 6 months ago


-Telemetry monitoring


-continue carvedilol, hydralazine, statin


-hold apixaban given surgery


-Cardiology consulted- recs appreciated- started diltiazem


-Resume heparin drip postop, ASA given recent stent and atrial fibrillation


-replete K, Mg PRN





5) DARIAN- improving near baseline


-IVF's; likely hypovolemic; doubt sepsis


-avoid nephrotoxins/renally dose meds


-will follow closely





6) Pancytopenia


-Hematology recs appreciated


-monitor for now





7) Acute encephalopathy/cognitive impairment


-Psychiatry recs appreciated


-seroquel increased to 50 tid





DVT Prophylaxis: scd's, IV heparin





Code status: full





Hospital Classification declaration: Based on this initial evaluation, and 

depending on the patient's clinical course, I anticipate that this patient will 

require hospitalization for 2-3 days. 





Disposition: Once the patient is stable to leave the hospital, I anticipate the 

patient will likely be discharged to the following environment:SNF- comes from 

[facility]





I spent 45 minutes on this patient's case, and 23 minutes was dedicated to 

counseling and/or care coordination. 





Time of note may not reflect time of encounter.





Subjective


Date patient seen:  Sep 5, 2018


Time patient seen:  13:01


ROS Limited/Unobtainable:  Yes


Constitutional:  Reports: no symptoms


HEENT:  Reports: no symptoms


Cardiovascular:  Reports: irregular heart rate


Respiratory:  Reports: no symptoms


Gastrointestinal/Abdominal:  Reports: abdominal pain


Genitourinary:  Reports: no symptoms


Neurologic/Psychiatric:  Reports: no symptoms


Endocrine:  Reports: no symptoms


Hematologic/Lymphatic:  Reports: no symptoms


Allergies:  


Coded Allergies:  


     AMPICILLIN (Verified  Allergy, Unknown, 8/3/18)


Uncoded Allergies:  


     PENICILLIN (Allergy, Unknown, 8/3/18)


All Systems:  reviewed and negative except above


Subjective


Events of overnight reviewed


Chart reviewed by me


Patient  s/p lap cholecystectomy


Resting postoperatively





Objective





Last 24 Hour Vital Signs








  Date Time  Temp Pulse Resp B/P (MAP) Pulse Ox O2 Delivery O2 Flow Rate FiO2


 


9/5/18 13:45  92 20 97/55 100 Nasal Cannula 3 


 


9/5/18 13:30 98.0 88 15 98/65 100 Nasal Cannula 3 





 98.0       


 


9/5/18 13:00  75  96/72    


 


9/5/18 13:00  87 19 96/53 99 Nasal Cannula 3 


 


9/5/18 12:45  88 18 93/48 99 Simple Mask 6 


 


9/5/18 12:34 208.9 109 21  100   


 


9/5/18 12:30  82 22 90/61 99 Simple Mask 6 


 


9/5/18 12:20  83 20 95/65 99 Simple Mask 6 


 


9/5/18 12:15  101 17 87/43 99 Simple Mask 6 


 


9/5/18 12:11 98.3 109 21 84/64 99 Simple Mask 6 





 98.3       


 


9/5/18 09:35  135  136/89    


 


9/5/18 09:16  152  136/89    


 


9/5/18 09:00      Room Air  


 


9/5/18 08:42  110  136/89    


 


9/5/18 08:00 97.3 135 20 136/89 (105) 93   





 97.3       


 


9/5/18 08:00  159      


 


9/5/18 05:51  109  150/77    


 


9/5/18 04:00  109      


 


9/5/18 04:00 97.5 103 20 150/77 (101) 93   





 97.5       


 


9/5/18 00:00 97.9 96 20 141/68 (92) 97   





 97.9       


 


9/5/18 00:00  93      


 


9/4/18 21:32  81  103/64    


 


9/4/18 21:00  81  103/64    


 


9/4/18 21:00      Room Air  


 


9/4/18 20:00  111      


 


9/4/18 20:00 97.5 81 20 103/64 (77) 97   





 97.5       


 


9/4/18 18:13 97.0       


 


9/4/18 17:14 97.0       


 


9/4/18 17:09  135  112/61    


 


9/4/18 16:00  100      


 


9/4/18 16:00 97.0 81 20 112/61 (78) 96   





 97.0       

















Intake and Output  


 


 9/4/18 9/5/18





 19:00 07:00


 


Intake Total 790 ml 


 


Output Total 0 ml 


 


Balance 790 ml 


 


  


 


Intake Oral 540 ml 


 


IV Total 250 ml 


 


Estimated Blood Loss 0 ml 


 


# Voids 2 2








Laboratory Tests


9/5/18 06:55: 


White Blood Count 8.4, Red Blood Count 3.96L, Hemoglobin 10.7L, Hematocrit 33.3L

, Mean Corpuscular Volume 84, Mean Corpuscular Hemoglobin 26.9L, Mean 

Corpuscular Hemoglobin Concent 32.0, Red Cell Distribution Width 13.2, Platelet 

Count 229, Mean Platelet Volume 6.7, Neutrophils (%) (Auto) 72.5, Lymphocytes (%

) (Auto) 12.5L, Monocytes (%) (Auto) 12.2H, Eosinophils (%) (Auto) 2.3, 

Basophils (%) (Auto) 0.6, Sodium Level 143, Potassium Level 4.1, Chloride Level 

108H, Carbon Dioxide Level 29, Anion Gap 6, Blood Urea Nitrogen 4L, Creatinine 

0.8, Estimat Glomerular Filtration Rate > 60, Glucose Level 113H, Calcium Level 

8.4L, Total Bilirubin 0.6, Aspartate Amino Transf (AST/SGOT) 53H, Alanine 

Aminotransferase (ALT/SGPT) 29, Alkaline Phosphatase 132H, Total Protein 6.1L, 

Albumin 2.5L, Globulin 3.6, Albumin/Globulin Ratio 0.7L


Height (Feet):  5


Height (Inches):  9.00


Weight (Pounds):  149


General Appearance:  no apparent distress, lethargic, confused


EENT:  PERRL/EOMI, normal ENT inspection, TMs normal, pharynx normal, pale 

conjunctivae


Neck:  non-tender, supple


Cardiovascular:  normal peripheral pulses, regularly irregular, tachycardia


Respiratory/Chest:  chest wall non-tender, decreased breath sounds


Abdomen:  non tender, decreased bowel sounds, distended


Pelvis:  normal external exam


Extremities:  normal range of motion, non-tender


Edema:  no edema noted Arm (L), no edema noted Arm (R)


Edema:  trace edema


Neurologic:  other - somnolent but arousable


Lymphatic:  normal anterior cervical (L), normal anterior cervical (R)











Gino Stover MD Sep 5, 2018 15:35

## 2018-09-05 NOTE — CARDIOLOGY PROGRESS NOTE
Assessment/Plan


Assessment/Plan


perm afib now with rvr 


acute derrick s/p lap derrick


jorge l with hs of bifascicular block 


hs of cm with subsequent resolution 


copd 


thrombocytopenia 


cad s/p pci lad rca cedrs 3/2018 


chronic chf 


MR not confirmed on echo here 8/2018








no chf sx 


he has noted to have resolution of his cm in 6/2018[facility] confirmed on echo 

here tee;erithis month 


at tiem still tachy on bid of coreg and bid of cardizem will increase


MR not confirmed on echo here ealier in august 


no sx o acs 


ecotrin 


resuem heparin when safe 


will increase dilt to 60 mg tid as poor control specially at time fo agitation





Subjective


Cardiovascular:  Denies: chest pain, lightheadedness, palpitations


Respiratory:  Denies: shortness of breath


Gastrointestinal/Abdominal:  Denies: abdominal pain


Genitourinary:  Denies: burning





Objective





Last 24 Hour Vital Signs








  Date Time  Temp Pulse Resp B/P (MAP) Pulse Ox O2 Delivery O2 Flow Rate FiO2


 


9/5/18 19:47  160  105/63    


 


9/5/18 17:30  98  109/70    


 


9/5/18 16:09  148  109/70    


 


9/5/18 16:00 97.6  20 109/70 (83) 100   





 97.6       


 


9/5/18 16:00  108      


 


9/5/18 13:45  92 20 97/55 100 Nasal Cannula 3 


 


9/5/18 13:30 98.0 88 15 98/65 100 Nasal Cannula 3 





 98.0       


 


9/5/18 13:00  75  96/72    


 


9/5/18 13:00  87 19 96/53 99 Nasal Cannula 3 


 


9/5/18 12:45  88 18 93/48 99 Simple Mask 6 


 


9/5/18 12:34 208.9 109 21  100   


 


9/5/18 12:30  82 22 90/61 99 Simple Mask 6 


 


9/5/18 12:20  83 20 95/65 99 Simple Mask 6 


 


9/5/18 12:15  101 17 87/43 99 Simple Mask 6 


 


9/5/18 12:11 98.3 109 21 84/64 99 Simple Mask 6 





 98.3       


 


9/5/18 09:35  135  136/89    


 


9/5/18 09:16  152  136/89    


 


9/5/18 09:00      Room Air  


 


9/5/18 08:42  110  136/89    


 


9/5/18 08:00 97.3 135 20 136/89 (105) 93   





 97.3       


 


9/5/18 08:00  159      


 


9/5/18 05:51  109  150/77    


 


9/5/18 04:00  109      


 


9/5/18 04:00 97.5 103 20 150/77 (101) 93   





 97.5       


 


9/5/18 00:00 97.9 96 20 141/68 (92) 97   





 97.9       


 


9/5/18 00:00  93      


 


9/4/18 21:32  81  103/64    


 


9/4/18 21:00  81  103/64    


 


9/4/18 21:00      Room Air  








General Appearance:  alert


Neck:  supple


Cardiovascular:  tachycardia, irregularly irregular


Respiratory/Chest:  crackles/rales


Abdomen:  normal bowel sounds, non tender, soft


Extremities:  no swelling











Intake and Output  


 


 9/4/18 9/5/18





 19:00 07:00


 


Intake Total 790 ml 


 


Output Total 0 ml 


 


Balance 790 ml 


 


  


 


Intake Oral 540 ml 


 


IV Total 250 ml 


 


Estimated Blood Loss 0 ml 


 


# Voids 2 2











Laboratory Tests








Test


  9/5/18


06:55


 


White Blood Count


  8.4 K/UL


(4.8-10.8)


 


Red Blood Count


  3.96 M/UL


(4.70-6.10)  L


 


Hemoglobin


  10.7 G/DL


(14.2-18.0)  L


 


Hematocrit


  33.3 %


(42.0-52.0)  L


 


Mean Corpuscular Volume 84 FL (80-99)  


 


Mean Corpuscular Hemoglobin


  26.9 PG


(27.0-31.0)  L


 


Mean Corpuscular Hemoglobin


Concent 32.0 G/DL


(32.0-36.0)


 


Red Cell Distribution Width


  13.2 %


(11.6-14.8)


 


Platelet Count


  229 K/UL


(150-450)


 


Mean Platelet Volume


  6.7 FL


(6.5-10.1)


 


Neutrophils (%) (Auto)


  72.5 %


(45.0-75.0)


 


Lymphocytes (%) (Auto)


  12.5 %


(20.0-45.0)  L


 


Monocytes (%) (Auto)


  12.2 %


(1.0-10.0)  H


 


Eosinophils (%) (Auto)


  2.3 %


(0.0-3.0)


 


Basophils (%) (Auto)


  0.6 %


(0.0-2.0)


 


Sodium Level


  143 MMOL/L


(136-145)


 


Potassium Level


  4.1 MMOL/L


(3.5-5.1)


 


Chloride Level


  108 MMOL/L


()  H


 


Carbon Dioxide Level


  29 MMOL/L


(21-32)


 


Anion Gap


  6 mmol/L


(5-15)


 


Blood Urea Nitrogen


  4 mg/dL (7-18)


L


 


Creatinine


  0.8 MG/DL


(0.55-1.30)


 


Estimat Glomerular Filtration


Rate > 60 mL/min


(>60)


 


Glucose Level


  113 MG/DL


()  H


 


Calcium Level


  8.4 MG/DL


(8.5-10.1)  L


 


Total Bilirubin


  0.6 MG/DL


(0.2-1.0)


 


Aspartate Amino Transf


(AST/SGOT) 53 U/L (15-37)


H


 


Alanine Aminotransferase


(ALT/SGPT) 29 U/L (12-78)


 


 


Alkaline Phosphatase


  132 U/L


()  H


 


Total Protein


  6.1 G/DL


(6.4-8.2)  L


 


Albumin


  2.5 G/DL


(3.4-5.0)  L


 


Globulin 3.6 g/dL  


 


Albumin/Globulin Ratio


  0.7 (1.0-2.7)


L

















Zach Fonseca MD Sep 5, 2018 20:31

## 2018-09-05 NOTE — IMMEDIATE POST-OP EVALUATION
Immediate Post-Op Evalulation


Immediate Post-Op Evalulation


Procedure:  ERCP


Date of Evaluation:  Sep 5, 2018


Time of Evaluation:  12:11


IV Fluids:   ml


Estimated Blood Loss:  50 ml


Blood Pressure Systolic:  084


Blood Pressure Diastolic:  64


Pulse Rate:  109


Respiratory Rate:  21


O2 Sat by Pulse Oximetry:  100


Temperature (Fahrenheit):  98.3


Pain Score (1-10):  0


Nausea:  No


Vomiting:  No


Complications


none noted


Patient Status:  awake, reacts, patent


Hydration Status:  adequate


Drug:  Cirporfloxacin


Given Within 1 Hr of Incision:  Yes


Time Given:  09:45











Maranda Olivera CRNA Sep 5, 2018 12:34

## 2018-09-05 NOTE — GENERAL PROGRESS NOTE
Assessment/Plan


Status:  unchanged


Assessment/Plan


encephalopathy due to C


MDD





increase seroquel 50 mg tid


the pt lacks capacity to make decision about placement.





Subjective


Date patient seen:  Sep 4, 2018


Neurologic/Psychiatric:  Reports: anxiety


Allergies:  


Coded Allergies:  


     AMPICILLIN (Verified  Allergy, Unknown, 8/3/18)


Uncoded Allergies:  


     PENICILLIN (Allergy, Unknown, 8/3/18)


Subjective


the pt has episodes of confusions and agitation





Objective





Last 24 Hour Vital Signs








  Date Time  Temp Pulse Resp B/P (MAP) Pulse Ox O2 Delivery O2 Flow Rate FiO2


 


9/5/18 09:35  135  136/89    


 


9/5/18 09:16  152  136/89    


 


9/5/18 09:00      Room Air  


 


9/5/18 08:42  110  136/89    


 


9/5/18 08:00 97.3 135 20 136/89 (105) 93   





 97.3       


 


9/5/18 08:00  159      


 


9/5/18 05:51  109  150/77    


 


9/5/18 04:00  109      


 


9/5/18 04:00 97.5 103 20 150/77 (101) 93   





 97.5       


 


9/5/18 00:00 97.9 96 20 141/68 (92) 97   





 97.9       


 


9/5/18 00:00  93      


 


9/4/18 21:32  81  103/64    


 


9/4/18 21:00  81  103/64    


 


9/4/18 21:00      Room Air  


 


9/4/18 20:00  111      


 


9/4/18 20:00 97.5 81 20 103/64 (77) 97   





 97.5       


 


9/4/18 18:13 97.0       


 


9/4/18 17:14 97.0       


 


9/4/18 17:09  135  112/61    


 


9/4/18 16:00  100      


 


9/4/18 16:00 97.0 81 20 112/61 (78) 96   





 97.0       


 


9/4/18 15:12  95  121/68    


 


9/4/18 13:41 207.7 86 20  98   

















Intake and Output  


 


 9/4/18 9/5/18





 19:00 07:00


 


Intake Total 790 ml 


 


Output Total 0 ml 


 


Balance 790 ml 


 


  


 


Intake Oral 540 ml 


 


IV Total 250 ml 


 


Estimated Blood Loss 0 ml 


 


# Voids 2 2








Laboratory Tests


9/5/18 06:55: 


White Blood Count 8.4, Red Blood Count 3.96L, Hemoglobin 10.7L, Hematocrit 33.3L

, Mean Corpuscular Volume 84, Mean Corpuscular Hemoglobin 26.9L, Mean 

Corpuscular Hemoglobin Concent 32.0, Red Cell Distribution Width 13.2, Platelet 

Count 229, Mean Platelet Volume 6.7, Neutrophils (%) (Auto) 72.5, Lymphocytes (%

) (Auto) 12.5L, Monocytes (%) (Auto) 12.2H, Eosinophils (%) (Auto) 2.3, 

Basophils (%) (Auto) 0.6, Sodium Level 143, Potassium Level 4.1, Chloride Level 

108H, Carbon Dioxide Level 29, Anion Gap 6, Blood Urea Nitrogen 4L, Creatinine 

0.8, Estimat Glomerular Filtration Rate > 60, Glucose Level 113H, Calcium Level 

8.4L, Total Bilirubin 0.6, Aspartate Amino Transf (AST/SGOT) 53H, Alanine 

Aminotransferase (ALT/SGPT) 29, Alkaline Phosphatase 132H, Total Protein 6.1L, 

Albumin 2.5L, Globulin 3.6, Albumin/Globulin Ratio 0.7L


Height (Feet):  5


Height (Inches):  9.00


Weight (Pounds):  149


General Appearance:  no apparent distress, alert, confused, agitated











Rodri Medina MD Sep 5, 2018 12:32

## 2018-09-05 NOTE — GENERAL PROGRESS NOTE
Assessment/Plan


Assessment/Plan


encephalopathy due to C


MDD





increase seroquel 50 mg tid


the pt lacks capacity to make decision about placement.





Subjective


Date patient seen:  Sep 5, 2018


Neurologic/Psychiatric:  Reports: anxiety, depressed


Allergies:  


Coded Allergies:  


     AMPICILLIN (Verified  Allergy, Unknown, 8/3/18)


Uncoded Allergies:  


     PENICILLIN (Allergy, Unknown, 8/3/18)


Subjective


the pt has episodes of confusions and agitation. he is the same





Objective





Last 24 Hour Vital Signs








  Date Time  Temp Pulse Resp B/P (MAP) Pulse Ox O2 Delivery O2 Flow Rate FiO2


 


9/5/18 09:35  135  136/89    


 


9/5/18 09:16  152  136/89    


 


9/5/18 09:00      Room Air  


 


9/5/18 08:42  110  136/89    


 


9/5/18 08:00 97.3 135 20 136/89 (105) 93   





 97.3       


 


9/5/18 08:00  159      


 


9/5/18 05:51  109  150/77    


 


9/5/18 04:00  109      


 


9/5/18 04:00 97.5 103 20 150/77 (101) 93   





 97.5       


 


9/5/18 00:00 97.9 96 20 141/68 (92) 97   





 97.9       


 


9/5/18 00:00  93      


 


9/4/18 21:32  81  103/64    


 


9/4/18 21:00  81  103/64    


 


9/4/18 21:00      Room Air  


 


9/4/18 20:00  111      


 


9/4/18 20:00 97.5 81 20 103/64 (77) 97   





 97.5       


 


9/4/18 18:13 97.0       


 


9/4/18 17:14 97.0       


 


9/4/18 17:09  135  112/61    


 


9/4/18 16:00  100      


 


9/4/18 16:00 97.0 81 20 112/61 (78) 96   





 97.0       


 


9/4/18 15:12  95  121/68    


 


9/4/18 13:41 207.7 86 20  98   

















Intake and Output  


 


 9/4/18 9/5/18





 19:00 07:00


 


Intake Total 790 ml 


 


Output Total 0 ml 


 


Balance 790 ml 


 


  


 


Intake Oral 540 ml 


 


IV Total 250 ml 


 


Estimated Blood Loss 0 ml 


 


# Voids 2 2








Laboratory Tests


9/5/18 06:55: 


White Blood Count 8.4, Red Blood Count 3.96L, Hemoglobin 10.7L, Hematocrit 33.3L

, Mean Corpuscular Volume 84, Mean Corpuscular Hemoglobin 26.9L, Mean 

Corpuscular Hemoglobin Concent 32.0, Red Cell Distribution Width 13.2, Platelet 

Count 229, Mean Platelet Volume 6.7, Neutrophils (%) (Auto) 72.5, Lymphocytes (%

) (Auto) 12.5L, Monocytes (%) (Auto) 12.2H, Eosinophils (%) (Auto) 2.3, 

Basophils (%) (Auto) 0.6, Sodium Level 143, Potassium Level 4.1, Chloride Level 

108H, Carbon Dioxide Level 29, Anion Gap 6, Blood Urea Nitrogen 4L, Creatinine 

0.8, Estimat Glomerular Filtration Rate > 60, Glucose Level 113H, Calcium Level 

8.4L, Total Bilirubin 0.6, Aspartate Amino Transf (AST/SGOT) 53H, Alanine 

Aminotransferase (ALT/SGPT) 29, Alkaline Phosphatase 132H, Total Protein 6.1L, 

Albumin 2.5L, Globulin 3.6, Albumin/Globulin Ratio 0.7L


Height (Feet):  5


Height (Inches):  9.00


Weight (Pounds):  149











Rodri Medina MD Sep 5, 2018 12:33

## 2018-09-05 NOTE — ANETHESIA PREOPERATIVE EVAL
Anesthesia Pre-op PMH/ROS


General


Date of Evaluation:  Sep 5, 2018


Time of Evaluation:  10:15


Anesthesiologist:  Maranda Olivera CRNA


ASA Score:  ASA 3


Mallampati Score


Class I : Soft palate, uvula, fauces, pillars visible


Class II: Soft palate, uvula, fauces visible


Class III: Soft palate, base of uvula visible


Class IV: Only hard plate visible


Mallampati Classification:  Class II


Surgeon:  Kumar


Diagnosis:  Cholelisthesis


Surgical Procedure:  Laparoscopic cholecystectomy


Anesthesia History:  none - No prior anesthetic complications


Social History:  smoking, alcohol use


Family History:  no anesthesia problems


Allergies:  


Coded Allergies:  


     AMPICILLIN (Verified  Allergy, Unknown, 8/3/18)


Uncoded Allergies:  


     PENICILLIN (Allergy, Unknown, 8/3/18)





Past Medical History


Cardiovascular:  Reports: HTN, CAD - s/p stents, arrhythmia - atrial 

fibrilation with rapid ventricular response; 


   Denies: MI, valve dz, other


Pulmonary:  Reports: COPD; 


   Denies: asthma, CESAR, other


Gastrointestinal/Genitourinary:  Reports: GERD, other - Acute renal failure; 

cholelisthesis, cholecystitis, UTI; 


   Denies: CRI, ESRD


Neurologic/Psychiatric:  Reports: depression/anxiety; 


   Denies: dementia, CVA, TIA, other


Endocrine:  Denies: DM, hypothyroidism, steroids, other


HEENT:  Reports: other - (B) macular degeneration; 


   Denies: cataract (L), cataract (R), glaucoma, [employer] [place] (L), [employer] (R)


Hematology/Immune:  Reports: anemia; 


   Denies: DVT, bleeding disorder, other


Musculoskeletal/Integumentary:  Reports: DJD; 


   Denies: OA, RA, DDD, edema, other


Other:  other - Hepatitis C


PMH Narrative:


see above


PSxH Narrative:


s/p coronary stents





Anesthesia Pre-op Phys. Exam


Physician Exam





Last Vital Signs








  Date Time  Temp Pulse Resp B/P (MAP) Pulse Ox O2 Delivery O2 Flow Rate FiO2


 


9/5/18 09:35  135  136/89    


 


9/5/18 09:00      Room Air  


 


9/5/18 08:00 97.3  20  93   





 97.3       


 


9/4/18 09:26       3 








Constitutional:  NAD


Neurologic:  CN 2-12 intact


Cardiovascular:  other - Atrial fibrillation


Respiratory:  CTA


Gastrointestinal:  other - distended





Airway Exam


Mallampati Score:  Class II


MO:  full


TMD:  > 3 FB


ROM:  full


Teeth:  missing, broken


Dentures:  no upper, no lower





Anesthesia Pre-op A/P


Labs





Hematology








Test


  9/5/18


06:55


 


White Blood Count


  8.4 K/UL


(4.8-10.8)


 


Red Blood Count


  3.96 M/UL


(4.70-6.10)  L


 


Hemoglobin


  10.7 G/DL


(14.2-18.0)  L


 


Hematocrit


  33.3 %


(42.0-52.0)  L


 


Mean Corpuscular Volume 84 FL (80-99)  


 


Mean Corpuscular Hemoglobin


  26.9 PG


(27.0-31.0)  L


 


Mean Corpuscular Hemoglobin


Concent 32.0 G/DL


(32.0-36.0)


 


Red Cell Distribution Width


  13.2 %


(11.6-14.8)


 


Platelet Count


  229 K/UL


(150-450)


 


Mean Platelet Volume


  6.7 FL


(6.5-10.1)


 


Neutrophils (%) (Auto)


  72.5 %


(45.0-75.0)


 


Lymphocytes (%) (Auto)


  12.5 %


(20.0-45.0)  L


 


Monocytes (%) (Auto)


  12.2 %


(1.0-10.0)  H


 


Eosinophils (%) (Auto)


  2.3 %


(0.0-3.0)


 


Basophils (%) (Auto)


  0.6 %


(0.0-2.0)








Chemistry








Test


  9/5/18


06:55


 


Sodium Level


  143 MMOL/L


(136-145)


 


Potassium Level


  4.1 MMOL/L


(3.5-5.1)


 


Chloride Level


  108 MMOL/L


()  H


 


Carbon Dioxide Level


  29 MMOL/L


(21-32)


 


Anion Gap


  6 mmol/L


(5-15)


 


Blood Urea Nitrogen


  4 mg/dL (7-18)


L


 


Creatinine


  0.8 MG/DL


(0.55-1.30)


 


Estimat Glomerular Filtration


Rate > 60 mL/min


(>60)


 


Glucose Level


  113 MG/DL


()  H


 


Calcium Level


  8.4 MG/DL


(8.5-10.1)  L


 


Total Bilirubin


  0.6 MG/DL


(0.2-1.0)


 


Aspartate Amino Transf


(AST/SGOT) 53 U/L (15-37)


H


 


Alanine Aminotransferase


(ALT/SGPT) 29 U/L (12-78)


 


 


Alkaline Phosphatase


  132 U/L


()  H


 


Total Protein


  6.1 G/DL


(6.4-8.2)  L


 


Albumin


  2.5 G/DL


(3.4-5.0)  L


 


Globulin 3.6 g/dL  


 


Albumin/Globulin Ratio


  0.7 (1.0-2.7)


L











Studies


Pre-op Studies:  EKG - see chart





Risk Assessment & Plan


Assessment:


ASA 3, recurrent gallstones despite ERCP, urgent need for lap derrick


Plan:


GETA


Status Change Before Surgery:  Yes - HR 180s; given cardezem 10 mg IV and 30 mg 

PO HR down to baseline 100-130s





Pre-Antibiotics


Given Within 1 Hr of Incision:  Yes - Given on floor


Time Given:  09:45











Maranda Olivera CRNA Sep 5, 2018 12:29

## 2018-09-05 NOTE — BRIEF OPERATIVE NOTE
Immediate Post Operative Note


Operative Note


Pre-op Diagnosis:


acute cholecystitis, choledocholithiasis


Procedure:


lap derrick


Post-op Diagnosis:  same as pre-op


Surgeon:  kateryna


Anesthesia:  general


Specimen:  yes


Complications:  none


Condition:  stable


Fluids:  see records


Estimated Blood Loss:  volume - 50


Drains:  none


Implant(s) used?:  No











Timothy Heath Sep 5, 2018 13:07

## 2018-09-05 NOTE — GENERAL PROGRESS NOTE
Assessment/Plan


Assessment/Plan


1. Pancytopenia, potentially secondary to liver disease, bilirubin elevated on 

admission. The patient had transaminitis on admission as well. MCV is elevated.

  


--> Hepatitis A and B are negative, Hep-C is positive, HIV negative. 


--> Ultrasound of the abdomen reviewed. No evidence of cirrhosis or 

splenomegaly 


--> We will continue to closely monitor.  


--> Monitor platelet count closely.


--> Counts improved, compared to admission, at this time, doesn't need a bone 

marrow biopsy


2. Anemia of chronic disease. Anemia panel has been reviewed. Will trend CBC 

daily. 


--> Hemoglobin goal is above 7.  


--> Current Hgb at 10.3, stable 


--> no evidence of hemolysis


3. Anemia due to underlying congestive heart failure, decompensated state.


--> monitor fluid status closely, as per cards


4. Cholecystitis. s/p 9/3 ercp, 2 stones removed


--> surg eval prn


5. Possible viral urinary tract infection.


6. Transaminitis, hepatic congestion questionable.


7. Deep venous thrombosis prophylaxis with heparin sq








The time the note was entered does not necessarily correspond to the time the 

patient was seen.





Subjective


Constitutional:  Denies: no symptoms, chills, diaphoresis, fever, malaise, 

weakness, other


HEENT:  Denies: no symptoms, eye pain, blurred vision, tearing, double vision, 

ear pain, ear discharge, nose pain, nose congestion, throat pain, throat 

swelling, mouth pain, mouth swelling, other


Cardiovascular:  Denies: no symptoms, chest pain, edema, irregular heart rate, 

lightheadedness, palpitations, syncope, other


Respiratory:  Denies: no symptoms, cough, orthopnea, shortness of breath, SOB 

with excertion, SOB at rest, sputum, stridor, wheezing, other


Gastrointestinal/Abdominal:  Denies: no symptoms, abdomen distended, abdominal 

pain, black stools, tarry stools, blood in stool, constipated, diarrhea, 

difficulty swallowing, nausea, poor appetite, poor fluid intake, rectal bleeding

, vomiting, other


Genitourinary:  Denies: no symptoms, burning, discharge, frequency, flank pain, 

hematuria, incontinence, pain, urgency, other


Neurologic/Psychiatric:  Denies: no symptoms, anxiety, depressed, emotional 

problems, headache, numbness, paresthesia, pre-existing deficit, seizure, 

tingling, tremors, weakness, other


Endocrine:  Denies: no symptoms, excessive sweating, flushing, intolerance to 

cold, intolerance to heat, increased hunger, increased thirst, increased urine, 

unexplained weight gain, unexplained weight loss, other


Hematologic/Lymphatic:  Denies: no symptoms, anemia, easy bleeding, easy 

bruising, other


Allergies:  


Coded Allergies:  


     AMPICILLIN (Verified  Allergy, Unknown, 8/3/18)


Uncoded Allergies:  


     PENICILLIN (Allergy, Unknown, 8/3/18)


Subjective


Pt awake and agitated.  No acute events. s/p ercp and 2 stones were removed.





Objective





Last 24 Hour Vital Signs








  Date Time  Temp Pulse Resp B/P (MAP) Pulse Ox O2 Delivery O2 Flow Rate FiO2


 


9/5/18 04:00  109      


 


9/5/18 04:00 97.5 103 20 150/77 (101) 93   





 97.5       


 


9/5/18 00:00 97.9 96 20 141/68 (92) 97   





 97.9       


 


9/5/18 00:00  93      


 


9/4/18 21:32  81  103/64    


 


9/4/18 21:00  81  103/64    


 


9/4/18 21:00      Room Air  


 


9/4/18 20:00  111      


 


9/4/18 20:00 97.5 81 20 103/64 (77) 97   





 97.5       


 


9/4/18 18:13 97.0       


 


9/4/18 17:14 97.0       


 


9/4/18 17:09  135  112/61    


 


9/4/18 16:00  100      


 


9/4/18 16:00 97.0 81 20 112/61 (78) 96   





 97.0       


 


9/4/18 15:12  95  121/68    


 


9/4/18 13:41 207.7 86 20  98   


 


9/4/18 12:00 97.9 79 20 112/61 (78) 95   





 97.9       


 


9/4/18 12:00  85      


 


9/4/18 09:26 97 87 16 104/76 100 Nasal Cannula 3 





 97.0       


 


9/4/18 09:20  90 18 114/74 100 Nasal Cannula 3 


 


9/4/18 09:15 207.5 98 22  99   


 


9/4/18 09:10  94 17 95/69 100 Simple Mask 6 


 


9/4/18 09:05  97 16 97/64 100 Simple Mask 6 


 


9/4/18 09:00      Nasal Cannula 2.0 


 


9/4/18 09:00  93 13 99/66 100 Simple Mask 6 


 


9/4/18 09:00  87  104/76    


 


9/4/18 08:58 97 95 14 96/63 100 Simple Mask 6 





 97.0       


 


9/4/18 08:00  146      


 


9/4/18 07:50  150  112/74    

















Intake and Output  


 


 9/4/18 9/5/18





 19:00 07:00


 


Intake Total 790 ml 


 


Output Total 0 ml 


 


Balance 790 ml 


 


  


 


Intake Oral 540 ml 


 


IV Total 250 ml 


 


Estimated Blood Loss 0 ml 


 


# Voids 2 








Height (Feet):  5


Height (Inches):  9.00


Weight (Pounds):  150


EENT:  normal ENT inspection


Neck:  normal alignment


Cardiovascular:  normal rate


Respiratory/Chest:  no respiratory distress


Abdomen:  no mass


Extremities:  non-tender


Edema:  1+ Leg (L), 1+ Leg (R)


Edema:  trace edema


Neurologic:  alert











Jack Benitez MD Sep 5, 2018 05:47

## 2018-09-05 NOTE — OPERATIVE NOTE - DICTATED
DATE OF OPERATION:  09/05/2018



PREOPERATIVE DIAGNOSES:

1. Acute cholecystitis.

2. Choledocholithiasis.



POSTOPERATIVE DIAGNOSES:

1. Acute cholecystitis.

2. Choledocholithiasis.



PROCEDURE PERFORMED:  Laparoscopic cholecystectomy.



ATTENDING SURGEON:  Timothy Heath M.D.



ASSISTANT:  None.



ANESTHESIOLOGIST:  Maranda BRONSON



ANESTHESIA:  General GTA.



ESTIMATED BLOOD LOSS:  50 mL.



IV FLUIDS:  Please see anesthesia records.



COMPLICATIONS:  None.



CONDITION:  Stable.



DRAINS:  None.



IMPLANTS:  None.



WOUND CLASSIFICATION:  Class III.



SPECIMENS:  Gallbladder and contained stones sent to pathology for

review.



COUNTS:  Sponge and needle count correct x2.



INDICATIONS FOR PROCEDURE:  This is a 66-year-old male who is well known to

me from prior admission with acute cholecystitis and choledocholithiasis.

The patient presented again with worsening acute cholecystitis and

choledocholithiasis for second time, which time he was seen by

Gastroenterology and performed ERCP with removal of 2 common bile duct

stones and sphincterotomy.  The patient had significant cholecystitis in

the past and given his second episode, cholecystectomy was indicated and

given this being the patient's second episode and continuous symptoms and

worsening condition, and the surgery was indicated and recommended.

Risks, benefits, and alternatives were discussed with the patient who

expressed understanding and consented to surgery.



OPERATIVE NOTE:  The patient was then taken to the operating room and

placed on the operative table in supine position with bilateral arms out.

All bony prominences well padded.  SCDs were placed.  No Franklin catheter

was inserted given the patient voided prior to entering the operating

room.  The patient was scheduled on IV antibiotics for acute active

inflammatory process.  Preoperative time-out was taken identifying the

patient, procedure, operative staff, and surgical staff.  General

anesthesia was induced and the patient was intubated.  The abdomen was

then clipped, prepped, and draped in standard surgical fashion.  An

umbilical incision was made using a #11 scalpel.  Incision was carried

down.  The fascia was elevated and incised and entry into the abdomen was

obtained using the open Agustina technique.  A 12 mm Agustina trocar was

inserted and abdomen insufflated 12 to 15 mmHg.  The laparoscope was

inserted and the abdomen was inspected.  The patient was placed in the

reverse Trendelenburg position with the left side down.  Inspecting the

abdomen, there was a moderate amount of ascites noted.  The liver did have

some macronodular changes.  The omentum was densely adhesed in the right

upper quadrant to the gallbladder with inflammatory reaction.  Secondary

trocars were placed under direct visualization beginning with a 12 mm

epigastric subxiphoid trocar followed by two 5 mm right subcostal trocars.

A fair amount of difficulty was required and taken down the omentum from

the gallbladder given the amount of inflammation noted.  Fortunately, this

was done safely with electrocautery and blunt dissection.  Following this,

the gallbladder dome was identified and grasped using most lateral port

and retracted over the liver.  The infundibulum was gently teased out

until noted and grasped using the midclavicular port and retracted towards

the right lower quadrant.  With gentle dissection, the cystic duct and

artery were both identified and circumferentially dissected out.  They

were doubly clipped and divided.  Following this, gallbladder was taken

off the liver bed using electrocautery.  Gallbladder was placed in

endoscopic retrieval bag and removed from the abdomen using the umbilical

port site.  There was a fair amount of oozing in the liver bed and from

the omental remnant and hemostasis was achieved using electrocautery,

Surgicel and FloSeal.  Following this, the abdomen was inspected and we

began our conclusion of the procedure.  Throughout the procedure soon,

acetic fluid was evacuated.  Good hemostasis was noted end of the

procedure.  No bleeding or bile leakage was identified.  Secondary trocars

were removed under direct visualization.  The umbilical trocar was then

removed and the abdomen allowed to be desufflated.  The subxiphoid and

umbilical port fascial incisions were closed using figure-of-eight #0

Vicryl sutures.  The remaining skin incisions were closed using 4-0

Monocryl subcuticular interrupted sutures.  Skin glue and Steri-Strips

were applied.  The patient tolerated the procedure well, was extubated and

taken to postanesthetic care unit in stable condition.









  ______________________________________________

  Timothy Heath M.D.





DR:  ARJUN

D:  09/05/2018 18:10

T:  09/05/2018 22:49

JOB#:  [id number]

CC:



ISHMAEL

## 2018-09-05 NOTE — GENERAL PROGRESS NOTE
Assessment/Plan


Assessment/Plan


Assesement


- cholecystitis


- choledocholithiasis, s/p ERCP and stone extraction


- anemia


- s/p cholecystectomy





Recommendations


- IVF


- Abx


- follow labs


- diet per surgery





Subjective


Allergies:  


Coded Allergies:  


     AMPICILLIN (Verified  Allergy, Unknown, 8/3/18)


Uncoded Allergies:  


     PENICILLIN (Allergy, Unknown, 8/3/18)


Subjective


Seen this am, prior to surgery


feels OK 


anxious about procedures





Objective





Last 24 Hour Vital Signs








  Date Time  Temp Pulse Resp B/P (MAP) Pulse Ox O2 Delivery O2 Flow Rate FiO2


 


9/5/18 21:42  109  105/63    


 


9/5/18 21:41  109  105/63    


 


9/5/18 20:00  103      


 


9/5/18 20:00 97.7 109 20 105/63 (77) 95   





 97.7       


 


9/5/18 19:47  160  105/63    


 


9/5/18 17:30  98  109/70    


 


9/5/18 16:09  148  109/70    


 


9/5/18 16:00 97.6  20 109/70 (83) 100   





 97.6       


 


9/5/18 16:00  108      


 


9/5/18 13:45  92 20 97/55 100 Nasal Cannula 3 


 


9/5/18 13:30 98.0 88 15 98/65 100 Nasal Cannula 3 





 98.0       


 


9/5/18 13:00  75  96/72    


 


9/5/18 13:00  87 19 96/53 99 Nasal Cannula 3 


 


9/5/18 12:45  88 18 93/48 99 Simple Mask 6 


 


9/5/18 12:34 208.9 109 21  100   


 


9/5/18 12:30  82 22 90/61 99 Simple Mask 6 


 


9/5/18 12:20  83 20 95/65 99 Simple Mask 6 


 


9/5/18 12:15  101 17 87/43 99 Simple Mask 6 


 


9/5/18 12:11 98.3 109 21 84/64 99 Simple Mask 6 





 98.3       


 


9/5/18 09:35  135  136/89    


 


9/5/18 09:16  152  136/89    


 


9/5/18 09:00      Room Air  


 


9/5/18 08:42  110  136/89    


 


9/5/18 08:00 97.3 135 20 136/89 (105) 93   





 97.3       


 


9/5/18 08:00  159      


 


9/5/18 05:51  109  150/77    


 


9/5/18 04:00  109      


 


9/5/18 04:00 97.5 103 20 150/77 (101) 93   





 97.5       


 


9/5/18 00:00 97.9 96 20 141/68 (92) 97   





 97.9       


 


9/5/18 00:00  93      

















Intake and Output  


 


 9/4/18 9/5/18





 19:00 07:00


 


Intake Total 790 ml 


 


Output Total 0 ml 


 


Balance 790 ml 


 


  


 


Intake Oral 540 ml 


 


IV Total 250 ml 


 


Estimated Blood Loss 0 ml 


 


# Voids 2 2








Laboratory Tests


9/5/18 06:55: 


White Blood Count 8.4, Red Blood Count 3.96L, Hemoglobin 10.7L, Hematocrit 33.3L

, Mean Corpuscular Volume 84, Mean Corpuscular Hemoglobin 26.9L, Mean 

Corpuscular Hemoglobin Concent 32.0, Red Cell Distribution Width 13.2, Platelet 

Count 229, Mean Platelet Volume 6.7, Neutrophils (%) (Auto) 72.5, Lymphocytes (%

) (Auto) 12.5L, Monocytes (%) (Auto) 12.2H, Eosinophils (%) (Auto) 2.3, 

Basophils (%) (Auto) 0.6, Sodium Level 143, Potassium Level 4.1, Chloride Level 

108H, Carbon Dioxide Level 29, Anion Gap 6, Blood Urea Nitrogen 4L, Creatinine 

0.8, Estimat Glomerular Filtration Rate > 60, Glucose Level 113H, Calcium Level 

8.4L, Total Bilirubin 0.6, Aspartate Amino Transf (AST/SGOT) 53H, Alanine 

Aminotransferase (ALT/SGPT) 29, Alkaline Phosphatase 132H, Total Protein 6.1L, 

Albumin 2.5L, Globulin 3.6, Albumin/Globulin Ratio 0.7L


Height (Feet):  5


Height (Inches):  9.00


Weight (Pounds):  149


Objective


WDWN


NCAT


supple


CTA


RRR


Abd soft NT ND


no edema


nonfocal











Clara Lenz MD Sep 5, 2018 22:23

## 2018-09-06 NOTE — GENERAL PROGRESS NOTE
Progress Note


Progress Note


Surgery:





looks remarkably well.  no n/v/f/c.  tolerating diet.  ambulatory.  pain 

minimal.  wounds c/d/i


exam benign.  states he feels well. 





s/p lap derrick, recovering





d/c planning


does not want narcotics.  OTC pain meds prn


wound instructions given to patient


care instructions given to patient


f/u with me in 2 weeks.











Timothy Heath Sep 6, 2018 14:19

## 2018-09-06 NOTE — CARDIOLOGY PROGRESS NOTE
Assessment/Plan


Assessment/Plan


perm afib now with rvr 


acute derrick s/p lap derrick


jorge l with hs of bifascicular block 


hs of cm with subsequent resolution 


copd 


thrombocytopenia 


cad s/p pci lad rca cedrs 3/2018 


chronic chf 


MR not confirmed on echo here 8/2018








no chf sx 


he has noted to have resolution of his cm in 6/2018at [facility] confirmed on echo 

here tee;erithis month 


at tiem still tachy on bid of coreg and bid of cardizem will increase


MR not confirmed on echo here ealier in august 


no sx o acs 


ecotrin 


resuem eleiquis if adn when safe  


hr is controlled





Subjective


Cardiovascular:  Denies: chest pain, lightheadedness, palpitations


Respiratory:  Denies: shortness of breath


Gastrointestinal/Abdominal:  Denies: abdominal pain


Genitourinary:  Denies: burning





Objective





Last 24 Hour Vital Signs








  Date Time  Temp Pulse Resp B/P (MAP) Pulse Ox O2 Delivery O2 Flow Rate FiO2


 


9/6/18 17:40  85  125/73    


 


9/6/18 16:13 97.5 85 20 102/71 (81) 97   





 97.5       


 


9/6/18 16:00  55      


 


9/6/18 12:52  95  146/81    


 


9/6/18 12:07 98.2 95 20 146/81 (102) 96   





 98.2       


 


9/6/18 12:00  77      


 


9/6/18 09:00      Room Air  


 


9/6/18 08:59 208.0 96 22  97   


 


9/6/18 08:49 208.4 93 21  98   


 


9/6/18 08:24  93  138/56    


 


9/6/18 08:24  93  138/56    


 


9/6/18 08:22 98.0 93 17 138/56 (83) 98   





 98.0       


 


9/6/18 08:00  101      


 


9/6/18 04:00  111      


 


9/6/18 04:00 96.7 59 20 112/72 (85) 93   





 96.7       


 


9/6/18 00:00  108      


 


9/6/18 00:00 98.0 68 22 104/79 (87) 93   





 98.0       


 


9/5/18 21:42  109  105/63    


 


9/5/18 21:41  109  105/63    


 


9/5/18 21:00      Room Air  








General Appearance:  no apparent distress, alert


Neck:  supple


Cardiovascular:  irregularly irregular


Respiratory/Chest:  lungs clear, normal breath sounds


Abdomen:  normal bowel sounds, non tender, soft


Extremities:  no swelling











Intake and Output  


 


 9/5/18 9/6/18





 19:00 07:00


 


Intake Total 850 ml 300 ml


 


Output Total 10 ml 


 


Balance 840 ml 300 ml


 


  


 


Intake Oral 600 ml 


 


IV Total 250 ml 300 ml


 


Estimated Blood Loss 10 ml 


 


# Voids  1











Laboratory Tests








Test


  9/6/18


05:30


 


White Blood Count


  12.5 K/UL


(4.8-10.8)  H


 


Red Blood Count


  3.96 M/UL


(4.70-6.10)  L


 


Hemoglobin


  11.1 G/DL


(14.2-18.0)  L


 


Hematocrit


  33.5 %


(42.0-52.0)  L


 


Mean Corpuscular Volume 85 FL (80-99)  


 


Mean Corpuscular Hemoglobin


  28.1 PG


(27.0-31.0)


 


Mean Corpuscular Hemoglobin


Concent 33.1 G/DL


(32.0-36.0)


 


Red Cell Distribution Width


  13.0 %


(11.6-14.8)


 


Platelet Count


  258 K/UL


(150-450)


 


Mean Platelet Volume


  6.4 FL


(6.5-10.1)  L


 


Neutrophils (%) (Auto)


  % (45.0-75.0)


 


 


Lymphocytes (%) (Auto)


  % (20.0-45.0)


 


 


Monocytes (%) (Auto)  % (1.0-10.0)  


 


Eosinophils (%) (Auto)  % (0.0-3.0)  


 


Basophils (%) (Auto)  % (0.0-2.0)  


 


Differential Total Cells


Counted 100  


 


 


Neutrophils % (Manual) 83 % (45-75)  H


 


Lymphocytes % (Manual) 12 % (20-45)  L


 


Monocytes % (Manual) 5 % (1-10)  


 


Eosinophils % (Manual) 0 % (0-3)  


 


Basophils % (Manual) 0 % (0-2)  


 


Band Neutrophils 0 % (0-8)  


 


Platelet Estimate Adequate  


 


Platelet Morphology Normal  


 


Hypochromasia 1+  


 


Sodium Level


  141 MMOL/L


(136-145)


 


Potassium Level


  4.3 MMOL/L


(3.5-5.1)


 


Chloride Level


  106 MMOL/L


()


 


Carbon Dioxide Level


  28 MMOL/L


(21-32)


 


Anion Gap


  7 mmol/L


(5-15)


 


Blood Urea Nitrogen


  6 mg/dL (7-18)


L


 


Creatinine


  0.8 MG/DL


(0.55-1.30)


 


Estimat Glomerular Filtration


Rate > 60 mL/min


(>60)


 


Glucose Level


  132 MG/DL


()  H


 


Calcium Level


  8.6 MG/DL


(8.5-10.1)


 


Total Bilirubin


  0.5 MG/DL


(0.2-1.0)


 


Aspartate Amino Transf


(AST/SGOT) 72 U/L (15-37)


H


 


Alanine Aminotransferase


(ALT/SGPT) 36 U/L (12-78)


 


 


Alkaline Phosphatase


  117 U/L


()  H


 


Total Protein


  6.2 G/DL


(6.4-8.2)  L


 


Albumin


  2.4 G/DL


(3.4-5.0)  L


 


Globulin 3.8 g/dL  


 


Albumin/Globulin Ratio


  0.6 (1.0-2.7)


L

















Zach Fonseca MD Sep 6, 2018 20:07

## 2018-09-06 NOTE — GENERAL PROGRESS NOTE
Assessment/Plan


Status:  stable, progressing


Assessment/Plan


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


MDD





increase seroquel 50 mg tid


the pt lacks capacity to make decision about placement.





Subjective


Date patient seen:  Sep 6, 2018


Neurologic/Psychiatric:  Reports: anxiety, depressed, emotional problems


Allergies:  


Coded Allergies:  


     AMPICILLIN (Verified  Allergy, Unknown, 8/3/18)


Uncoded Allergies:  


     PENICILLIN (Allergy, Unknown, 8/3/18)


Subjective


the pt has episodes of confusions and agitation. post op.





Objective





Last 24 Hour Vital Signs








  Date Time  Temp Pulse Resp B/P (MAP) Pulse Ox O2 Delivery O2 Flow Rate FiO2


 


9/6/18 09:00      Room Air  


 


9/6/18 08:59 208.0 96 22  97   


 


9/6/18 08:49 208.4 93 21  98   


 


9/6/18 08:24  93  138/56    


 


9/6/18 08:24  93  138/56    


 


9/6/18 08:22 98.0 93 17 138/56 (83) 98   





 98.0       


 


9/6/18 08:00  101      


 


9/6/18 04:00  111      


 


9/6/18 04:00 96.7 59 20 112/72 (85) 93   





 96.7       


 


9/6/18 00:00  108      


 


9/6/18 00:00 98.0 68 22 104/79 (87) 93   





 98.0       


 


9/5/18 21:42  109  105/63    


 


9/5/18 21:41  109  105/63    


 


9/5/18 21:00      Room Air  


 


9/5/18 20:00  103      


 


9/5/18 20:00 97.7 109 20 105/63 (77) 95   





 97.7       


 


9/5/18 19:47  160  105/63    


 


9/5/18 17:30  98  109/70    


 


9/5/18 16:09  148  109/70    


 


9/5/18 16:00 97.6  20 109/70 (83) 100   





 97.6       


 


9/5/18 16:00  108      


 


9/5/18 13:45  92 20 97/55 100 Nasal Cannula 3 


 


9/5/18 13:30 98.0 88 15 98/65 100 Nasal Cannula 3 





 98.0       


 


9/5/18 13:00  75  96/72    


 


9/5/18 13:00  87 19 96/53 99 Nasal Cannula 3 


 


9/5/18 12:45  88 18 93/48 99 Simple Mask 6 


 


9/5/18 12:34 208.9 109 21  100   


 


9/5/18 12:30  82 22 90/61 99 Simple Mask 6 


 


9/5/18 12:20  83 20 95/65 99 Simple Mask 6 


 


9/5/18 12:15  101 17 87/43 99 Simple Mask 6 


 


9/5/18 12:11 98.3 109 21 84/64 99 Simple Mask 6 





 98.3       

















Intake and Output  


 


 9/5/18 9/6/18





 19:00 07:00


 


Intake Total 850 ml 300 ml


 


Output Total 10 ml 


 


Balance 840 ml 300 ml


 


  


 


Intake Oral 600 ml 


 


IV Total 250 ml 300 ml


 


Estimated Blood Loss 10 ml 


 


# Voids  1








Laboratory Tests


9/6/18 05:30: 


White Blood Count 12.5H, Red Blood Count 3.96L, Hemoglobin 11.1L, Hematocrit 

33.5L, Mean Corpuscular Volume 85, Mean Corpuscular Hemoglobin 28.1, Mean 

Corpuscular Hemoglobin Concent 33.1, Red Cell Distribution Width 13.0, Platelet 

Count 258, Mean Platelet Volume 6.4L, Neutrophils (%) (Auto) , Lymphocytes (%) (

Auto) , Monocytes (%) (Auto) , Eosinophils (%) (Auto) , Basophils (%) (Auto) , 

Differential Total Cells Counted 100, Neutrophils % (Manual) 83H, Lymphocytes % 

(Manual) 12L, Monocytes % (Manual) 5, Eosinophils % (Manual) 0, Basophils % (

Manual) 0, Band Neutrophils 0, Platelet Estimate Adequate, Platelet Morphology 

Normal, Hypochromasia 1+, Sodium Level 141, Potassium Level 4.3, Chloride Level 

106, Carbon Dioxide Level 28, Anion Gap 7, Blood Urea Nitrogen 6L, Creatinine 

0.8, Estimat Glomerular Filtration Rate > 60, Glucose Level 132H, Calcium Level 

8.6, Total Bilirubin 0.5, Aspartate Amino Transf (AST/SGOT) 72H, Alanine 

Aminotransferase (ALT/SGPT) 36, Alkaline Phosphatase 117H, Total Protein 6.2L, 

Albumin 2.4L, Globulin 3.8, Albumin/Globulin Ratio 0.6L


Height (Feet):  5


Height (Inches):  9.00


Weight (Pounds):  149


General Appearance:  no apparent distress, alert


Neurologic:  oriented x 3, responsive, depressed affect











Rodri Medina MD Sep 6, 2018 11:17

## 2018-09-06 NOTE — 48 HOUR POST ANESTHESIA EVAL
Post Anesthesia Evaluation


Procedure:  Lap Marianela


Date of Evaluation:  Sep 6, 2018


Time of Evaluation:  08:47


Blood Pressure Systolic:  138


0:  56


Pulse Rate:  93


Respiratory Rate:  21


Temperature (Fahrenheit):  98


O2 Sat by Pulse Oximetry:  98


Airway:  patent


Nausea:  No


Vomiting:  No


Pain Intensity:  3


Hydration Status:  adequate


Cardiopulmonary Status:


VSS, no overnight events


Mental Status/LOC:  patient returned to baseline


Follow-up Care/Observations:


returned to floor primary team care


Post-Anesthesia Complications:


none noted


Follow-up care needed:  patient intructions given











Maranda Olivera CRNA Sep 6, 2018 08:49

## 2018-09-06 NOTE — GENERAL PROGRESS NOTE
Assessment/Plan


Assessment/Plan


Assesement


- cholecystitis


- choledocholithiasis, s/p ERCP and stone extraction


- anemia


- s/p cholecystectomy





Recommendations


- IVF


- Abx


- follow labs


- diet per surgery


- d/c planning





Subjective


Allergies:  


Coded Allergies:  


     AMPICILLIN (Verified  Allergy, Unknown, 8/3/18)


Uncoded Allergies:  


     PENICILLIN (Allergy, Unknown, 8/3/18)


Subjective


POD #1


s/p cholecystectomy


feels OK


some wound pain





Objective





Last 24 Hour Vital Signs








  Date Time  Temp Pulse Resp B/P (MAP) Pulse Ox O2 Delivery O2 Flow Rate FiO2


 


9/6/18 09:00      Room Air  


 


9/6/18 08:59 208.0 96 22  97   


 


9/6/18 08:49 208.4 93 21  98   


 


9/6/18 08:24  93  138/56    


 


9/6/18 08:24  93  138/56    


 


9/6/18 08:22 98.0 93 17 138/56 (83) 98   





 98.0       


 


9/6/18 08:00  101      


 


9/6/18 04:00  111      


 


9/6/18 04:00 96.7 59 20 112/72 (85) 93   





 96.7       


 


9/6/18 00:00  108      


 


9/6/18 00:00 98.0 68 22 104/79 (87) 93   





 98.0       


 


9/5/18 21:42  109  105/63    


 


9/5/18 21:41  109  105/63    


 


9/5/18 21:00      Room Air  


 


9/5/18 20:00  103      


 


9/5/18 20:00 97.7 109 20 105/63 (77) 95   





 97.7       


 


9/5/18 19:47  160  105/63    


 


9/5/18 17:30  98  109/70    


 


9/5/18 16:09  148  109/70    


 


9/5/18 16:00 97.6  20 109/70 (83) 100   





 97.6       


 


9/5/18 16:00  108      


 


9/5/18 13:45  92 20 97/55 100 Nasal Cannula 3 


 


9/5/18 13:30 98.0 88 15 98/65 100 Nasal Cannula 3 





 98.0       


 


9/5/18 13:00  75  96/72    


 


9/5/18 13:00  87 19 96/53 99 Nasal Cannula 3 


 


9/5/18 12:45  88 18 93/48 99 Simple Mask 6 


 


9/5/18 12:34 208.9 109 21  100   


 


9/5/18 12:30  82 22 90/61 99 Simple Mask 6 


 


9/5/18 12:20  83 20 95/65 99 Simple Mask 6 


 


9/5/18 12:15  101 17 87/43 99 Simple Mask 6 


 


9/5/18 12:11 98.3 109 21 84/64 99 Simple Mask 6 





 98.3       

















Intake and Output  


 


 9/5/18 9/6/18





 19:00 07:00


 


Intake Total 850 ml 300 ml


 


Output Total 10 ml 


 


Balance 840 ml 300 ml


 


  


 


Intake Oral 600 ml 


 


IV Total 250 ml 300 ml


 


Estimated Blood Loss 10 ml 


 


# Voids  1








Laboratory Tests


9/6/18 05:30: 


White Blood Count 12.5H, Red Blood Count 3.96L, Hemoglobin 11.1L, Hematocrit 

33.5L, Mean Corpuscular Volume 85, Mean Corpuscular Hemoglobin 28.1, Mean 

Corpuscular Hemoglobin Concent 33.1, Red Cell Distribution Width 13.0, Platelet 

Count 258, Mean Platelet Volume 6.4L, Neutrophils (%) (Auto) , Lymphocytes (%) (

Auto) , Monocytes (%) (Auto) , Eosinophils (%) (Auto) , Basophils (%) (Auto) , 

Differential Total Cells Counted 100, Neutrophils % (Manual) 83H, Lymphocytes % 

(Manual) 12L, Monocytes % (Manual) 5, Eosinophils % (Manual) 0, Basophils % (

Manual) 0, Band Neutrophils 0, Platelet Estimate Adequate, Platelet Morphology 

Normal, Hypochromasia 1+, Sodium Level 141, Potassium Level 4.3, Chloride Level 

106, Carbon Dioxide Level 28, Anion Gap 7, Blood Urea Nitrogen 6L, Creatinine 

0.8, Estimat Glomerular Filtration Rate > 60, Glucose Level 132H, Calcium Level 

8.6, Total Bilirubin 0.5, Aspartate Amino Transf (AST/SGOT) 72H, Alanine 

Aminotransferase (ALT/SGPT) 36, Alkaline Phosphatase 117H, Total Protein 6.2L, 

Albumin 2.4L, Globulin 3.8, Albumin/Globulin Ratio 0.6L


Height (Feet):  5


Height (Inches):  9.00


Weight (Pounds):  149


Objective


WDWN


NCAT


supple


CTA


RRR


Abd soft NT ND


no edema


nonfocal











Clara Lenz MD Sep 6, 2018 11:08

## 2018-09-06 NOTE — GENERAL PROGRESS NOTE
Assessment/Plan


Assessment/Plan


1. Pancytopenia, potentially secondary to liver disease, bilirubin elevated on 

admission. The patient had transaminitis on admission as well. MCV is elevated.

  


--> Hepatitis A and B are negative, Hep-C is positive, HIV negative. 


--> Ultrasound of the abdomen reviewed. No evidence of cirrhosis or 

splenomegaly 


--> Continue to closely monitor.  


--> Counts improved, compared to admission, at this time, doesn't need a bone 

marrow biopsy


2. Anemia of chronic disease. Anemia panel has been reviewed. Will trend CBC 

daily. 


--> Hemoglobin goal is above 7.  


--> Current Hgb at 10.3, stable 


--> no evidence of hemolysis


3. Anemia due to underlying congestive heart failure, decompensated state.


--> monitor fluid status closely, as per cards


4. Cholecystitis. s/p 9/3 ercp, 2 stones removed


--> surg eval prn


5. Possible viral urinary tract infection.


6. Transaminitis, hepatic congestion questionable.


7. Deep venous thrombosis prophylaxis with heparin sq








The time the note was entered does not necessarily correspond to the time the 

patient was seen.





Subjective


Constitutional:  Denies: no symptoms, chills, diaphoresis, fever, malaise, 

weakness, other


HEENT:  Denies: no symptoms, eye pain, blurred vision, tearing, double vision, 

ear pain, ear discharge, nose pain, nose congestion, throat pain, throat 

swelling, mouth pain, mouth swelling, other


Cardiovascular:  Denies: no symptoms, chest pain, edema, irregular heart rate, 

lightheadedness, palpitations, syncope, other


Respiratory:  Denies: no symptoms, cough, orthopnea, shortness of breath, SOB 

with excertion, SOB at rest, sputum, stridor, wheezing, other


Gastrointestinal/Abdominal:  Denies: no symptoms, abdomen distended, abdominal 

pain, black stools, tarry stools, blood in stool, constipated, diarrhea, 

difficulty swallowing, nausea, poor appetite, poor fluid intake, rectal bleeding

, vomiting, other


Genitourinary:  Denies: no symptoms, burning, discharge, frequency, flank pain, 

hematuria, incontinence, pain, urgency, other


Neurologic/Psychiatric:  Denies: no symptoms, anxiety, depressed, emotional 

problems, headache, numbness, paresthesia, pre-existing deficit, seizure, 

tingling, tremors, weakness, other


Endocrine:  Denies: no symptoms, excessive sweating, flushing, intolerance to 

cold, intolerance to heat, increased hunger, increased thirst, increased urine, 

unexplained weight gain, unexplained weight loss, other


Hematologic/Lymphatic:  Denies: no symptoms, anemia, easy bleeding, easy 

bruising, other


Allergies:  


Coded Allergies:  


     AMPICILLIN (Verified  Allergy, Unknown, 8/3/18)


Uncoded Allergies:  


     PENICILLIN (Allergy, Unknown, 8/3/18)


Subjective


Pt awake and agitated.  No acute events. s/p ercp and 2 stones were removed. 

getting pt/ot today





Objective





Last 24 Hour Vital Signs








  Date Time  Temp Pulse Resp B/P (MAP) Pulse Ox O2 Delivery O2 Flow Rate FiO2


 


9/6/18 20:00 98.5 83 20 113/66 (82) 95   





 98.5       


 


9/6/18 17:40  85  125/73    


 


9/6/18 16:13 97.5 85 20 102/71 (81) 97   





 97.5       


 


9/6/18 16:00  55      


 


9/6/18 12:52  95  146/81    


 


9/6/18 12:07 98.2 95 20 146/81 (102) 96   





 98.2       


 


9/6/18 12:00  77      


 


9/6/18 09:00      Room Air  


 


9/6/18 08:59 208.0 96 22  97   


 


9/6/18 08:49 208.4 93 21  98   


 


9/6/18 08:24  93  138/56    


 


9/6/18 08:24  93  138/56    


 


9/6/18 08:22 98.0 93 17 138/56 (83) 98   





 98.0       


 


9/6/18 08:00  101      


 


9/6/18 04:00  111      


 


9/6/18 04:00 96.7 59 20 112/72 (85) 93   





 96.7       


 


9/6/18 00:00  108      


 


9/6/18 00:00 98.0 68 22 104/79 (87) 93   





 98.0       


 


9/5/18 21:42  109  105/63    


 


9/5/18 21:41  109  105/63    


 


9/5/18 21:00      Room Air  

















Intake and Output  


 


 9/5/18 9/6/18





 19:00 07:00


 


Intake Total 850 ml 300 ml


 


Output Total 10 ml 


 


Balance 840 ml 300 ml


 


  


 


Intake Oral 600 ml 


 


IV Total 250 ml 300 ml


 


Estimated Blood Loss 10 ml 


 


# Voids  1








Laboratory Tests


9/6/18 05:30: 


White Blood Count 12.5H, Red Blood Count 3.96L, Hemoglobin 11.1L, Hematocrit 

33.5L, Mean Corpuscular Volume 85, Mean Corpuscular Hemoglobin 28.1, Mean 

Corpuscular Hemoglobin Concent 33.1, Red Cell Distribution Width 13.0, Platelet 

Count 258, Mean Platelet Volume 6.4L, Neutrophils (%) (Auto) , Lymphocytes (%) (

Auto) , Monocytes (%) (Auto) , Eosinophils (%) (Auto) , Basophils (%) (Auto) , 

Differential Total Cells Counted 100, Neutrophils % (Manual) 83H, Lymphocytes % 

(Manual) 12L, Monocytes % (Manual) 5, Eosinophils % (Manual) 0, Basophils % (

Manual) 0, Band Neutrophils 0, Platelet Estimate Adequate, Platelet Morphology 

Normal, Hypochromasia 1+, Sodium Level 141, Potassium Level 4.3, Chloride Level 

106, Carbon Dioxide Level 28, Anion Gap 7, Blood Urea Nitrogen 6L, Creatinine 

0.8, Estimat Glomerular Filtration Rate > 60, Glucose Level 132H, Calcium Level 

8.6, Total Bilirubin 0.5, Aspartate Amino Transf (AST/SGOT) 72H, Alanine 

Aminotransferase (ALT/SGPT) 36, Alkaline Phosphatase 117H, Total Protein 6.2L, 

Albumin 2.4L, Globulin 3.8, Albumin/Globulin Ratio 0.6L


Height (Feet):  5


Height (Inches):  9.00


Weight (Pounds):  149


General Appearance:  no apparent distress


EENT:  TMs normal


Neck:  supple


Cardiovascular:  regular rhythm


Respiratory/Chest:  normal breath sounds


Abdomen:  no organomegaly


Extremities:  non-tender


Edema:  1+ Leg (L), 1+ Leg (R)


Edema:  mild edema


Neurologic:  alert











Jack Benitez MD Sep 6, 2018 20:53

## 2018-09-06 NOTE — 48 HOUR POST ANESTHESIA EVAL
Post Anesthesia Evaluation


Procedure:  Lap Marianela


Date of Evaluation:  Sep 6, 2018


Time of Evaluation:  08:58


Blood Pressure Systolic:  134


0:  62


Pulse Rate:  96


Respiratory Rate:  22


Temperature (Fahrenheit):  97.8


O2 Sat by Pulse Oximetry:  97


Nausea:  No


Vomiting:  No


Pain Intensity:  3


Hydration Status:  adequate


Cardiopulmonary Status:


stable


Mental Status/LOC:  patient returned to baseline


Follow-up Care/Observations:


n/a


Post-Anesthesia Complications:


none


Follow-up care needed:  N/A











Rod Puentes MD Sep 6, 2018 08:59

## 2018-09-07 NOTE — CARDIOLOGY PROGRESS NOTE
Assessment/Plan


Assessment/Plan


perm afib now with rvr 


acute derrick s/p lap derrick


jorge l with hs of bifascicular block 


hs of cm with subsequent resolution 


copd 


thrombocytopenia 


cad s/p pci lad rca [facility] 3/2018 


chronic chf 


MR not confirmed on echo here 8/2018








no chf sx 


he has noted to have resolution of his cm in 6/2018at [facility] confirmed on echo 

here tee;[facility] 


[facility] and tid cardizwem 


MR not confirmed on echo here ealier in august 


no sx o acs 


ecotrin 


resume eliquis if and when safe  


hr is controlled





Subjective


Cardiovascular:  Denies: chest pain, irregular heart rate, lightheadedness, 

palpitations


Respiratory:  Denies: shortness of breath


Gastrointestinal/Abdominal:  Denies: abdominal pain


Genitourinary:  Denies: burning





Objective





Last 24 Hour Vital Signs








  Date Time  Temp Pulse Resp B/P (MAP) Pulse Ox O2 Delivery O2 Flow Rate FiO2


 


9/7/18 17:49  85  114/71    


 


9/7/18 16:11 98.0 85 18 114/71 (85) 98   





 98.0       


 


9/7/18 12:45  85  114/71    


 


9/7/18 12:00  73      


 


9/7/18 12:00 98.6 74 19 118/76 (90) 98   





 98.6       


 


9/7/18 09:00      Room Air  


 


9/7/18 08:46  85  114/71    


 


9/7/18 08:45  85  114/71    


 


9/7/18 08:00 98.0 85 18 114/71 (85) 98   





 98.0       


 


9/7/18 08:00  68      


 


9/7/18 04:00  82      


 


9/7/18 04:00 98.2 59 17 93/46 (62) 95   





 98.2       


 


9/7/18 00:00 98.7 95 17 85/49 (61) 95   





 98.7       


 


9/7/18 00:00  69      


 


9/6/18 21:07  88  113/66    


 


9/6/18 21:07  88  113/66    


 


9/6/18 21:00      Room Air  


 


9/6/18 20:00  99      


 


9/6/18 20:00 98.5 83 20 113/66 (82) 95   





 98.5       








General Appearance:  alert


Neck:  supple


Cardiovascular:  irregularly irregular


Respiratory/Chest:  lungs clear


Abdomen:  normal bowel sounds, non tender, soft


Extremities:  no swelling











Intake and Output  


 


 9/6/18 9/7/18





 19:00 07:00


 


Intake Total 1080 ml 


 


Balance 1080 ml 


 


  


 


Intake Oral 1080 ml 


 


# Voids 2 1

















Zach Fonseca MD Sep 7, 2018 19:48

## 2018-09-07 NOTE — GENERAL PROGRESS NOTE
Assessment/Plan


Assessment/Plan


1. Pancytopenia, potentially secondary to liver disease, bilirubin elevated on 

admission. The patient had transaminitis on admission as well. MCV is elevated.

  


--> Hepatitis A and B are negative, Hep-C is positive, HIV negative. 


--> Ultrasound of the abdomen reviewed. No evidence of cirrhosis or 

splenomegaly 


--> Continue to closely monitor.  


--> Counts improved, compared to admission, at this time, doesn't need a bone 

marrow biopsy


2. Anemia of chronic disease. Anemia panel has been reviewed. Will trend CBC 

daily. 


--> Hemoglobin goal is above 7.  


--> Current Hgb at 10.3, stable 


--> no evidence of hemolysis


3. Anemia due to underlying congestive heart failure, decompensated state.


--> monitor fluid status closely, as per cards


4. Cholecystitis. s/p 9/3 ercp, 2 stones removed


--> surg eval prn


5. Possible viral urinary tract infection.


6. Transaminitis, hepatic congestion questionable.


7. Deep venous thrombosis prophylaxis with heparin sq


--> ok to resume eliquis


8. Afib with RVR --> okay per cards for A/C








The time the note was entered does not necessarily correspond to the time the 

patient was seen.





Subjective


Constitutional:  Denies: no symptoms, chills, diaphoresis, fever, malaise, 

weakness, other


HEENT:  Denies: no symptoms, eye pain, blurred vision, tearing, double vision, 

ear pain, ear discharge, nose pain, nose congestion, throat pain, throat 

swelling, mouth pain, mouth swelling, other


Cardiovascular:  Denies: no symptoms, chest pain, edema, irregular heart rate, 

lightheadedness, palpitations, syncope, other


Respiratory:  Denies: no symptoms, cough, orthopnea, shortness of breath, SOB 

with excertion, SOB at rest, sputum, stridor, wheezing, other


Gastrointestinal/Abdominal:  Denies: no symptoms, abdomen distended, abdominal 

pain, black stools, tarry stools, blood in stool, constipated, diarrhea, 

difficulty swallowing, nausea, poor appetite, poor fluid intake, rectal bleeding

, vomiting, other


Genitourinary:  Denies: no symptoms, burning, discharge, frequency, flank pain, 

hematuria, incontinence, pain, urgency, other


Neurologic/Psychiatric:  Denies: no symptoms, anxiety, depressed, emotional 

problems, headache, numbness, paresthesia, pre-existing deficit, seizure, 

tingling, tremors, weakness, other


Endocrine:  Denies: no symptoms, excessive sweating, flushing, intolerance to 

cold, intolerance to heat, increased hunger, increased thirst, increased urine, 

unexplained weight gain, unexplained weight loss, other


Hematologic/Lymphatic:  Denies: no symptoms, anemia, easy bleeding, easy 

bruising, other


Allergies:  


Coded Allergies:  


     AMPICILLIN (Verified  Allergy, Unknown, 8/3/18)


Uncoded Allergies:  


     PENICILLIN (Allergy, Unknown, 8/3/18)


Subjective


Pt awake and agitated.  No acute events. s/p ercp and 2 stones were removed. 

receiving pt/ot today





Objective





Last 24 Hour Vital Signs








  Date Time  Temp Pulse Resp B/P (MAP) Pulse Ox O2 Delivery O2 Flow Rate FiO2


 


9/7/18 09:00      Room Air  


 


9/7/18 08:46  85  114/71    


 


9/7/18 08:45  85  114/71    


 


9/7/18 08:00 98.0 85 18 114/71 (85) 98   





 98.0       


 


9/7/18 04:00  82      


 


9/7/18 04:00 98.2 59 17 93/46 (62) 95   





 98.2       


 


9/7/18 00:00 98.7 95 17 85/49 (61) 95   





 98.7       


 


9/7/18 00:00  69      


 


9/6/18 21:07  88  113/66    


 


9/6/18 21:07  88  113/66    


 


9/6/18 21:00      Room Air  


 


9/6/18 20:00  99      


 


9/6/18 20:00 98.5 83 20 113/66 (82) 95   





 98.5       


 


9/6/18 17:40  85  125/73    


 


9/6/18 16:13 97.5 85 20 102/71 (81) 97   





 97.5       


 


9/6/18 16:00  55      


 


9/6/18 12:52  95  146/81    

















Intake and Output  


 


 9/6/18 9/7/18





 19:00 07:00


 


Intake Total 1080 ml 


 


Balance 1080 ml 


 


  


 


Intake Oral 1080 ml 


 


# Voids 2 1








Height (Feet):  5


Height (Inches):  9.00


Weight (Pounds):  149


General Appearance:  no apparent distress


EENT:  TMs normal


Neck:  supple


Cardiovascular:  normal rate


Respiratory/Chest:  lungs clear


Abdomen:  no organomegaly


Extremities:  non-tender


Edema:  1+ Leg (L), 1+ Leg (R)


Edema:  mild edema


Neurologic:  oriented x 3


Skin:  normal pigmentation











Jack Benitez MD Sep 7, 2018 12:12

## 2018-09-07 NOTE — GENERAL PROGRESS NOTE
Assessment/Plan


Status:  stable, progressing


Assessment/Plan


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


MDD





increase seroquel 50 mg tid


the pt lacks capacity to make decision about placement.





Subjective


Date patient seen:  Sep 7, 2018


Neurologic/Psychiatric:  Reports: anxiety


Allergies:  


Coded Allergies:  


     AMPICILLIN (Verified  Allergy, Unknown, 8/3/18)


Uncoded Allergies:  


     PENICILLIN (Allergy, Unknown, 8/3/18)


Subjective


the pt has episodes of confusions and agitation





Objective





Last 24 Hour Vital Signs








  Date Time  Temp Pulse Resp B/P (MAP) Pulse Ox O2 Delivery O2 Flow Rate FiO2


 


9/7/18 12:45  85  114/71    


 


9/7/18 09:00      Room Air  


 


9/7/18 08:46  85  114/71    


 


9/7/18 08:45  85  114/71    


 


9/7/18 08:00 98.0 85 18 114/71 (85) 98   





 98.0       


 


9/7/18 04:00  82      


 


9/7/18 04:00 98.2 59 17 93/46 (62) 95   





 98.2       


 


9/7/18 00:00 98.7 95 17 85/49 (61) 95   





 98.7       


 


9/7/18 00:00  69      


 


9/6/18 21:07  88  113/66    


 


9/6/18 21:07  88  113/66    


 


9/6/18 21:00      Room Air  


 


9/6/18 20:00  99      


 


9/6/18 20:00 98.5 83 20 113/66 (82) 95   





 98.5       


 


9/6/18 17:40  85  125/73    


 


9/6/18 16:13 97.5 85 20 102/71 (81) 97   





 97.5       


 


9/6/18 16:00  55      


 


9/6/18 12:52  95  146/81    

















Intake and Output  


 


 9/6/18 9/7/18





 19:00 07:00


 


Intake Total 1080 ml 


 


Balance 1080 ml 


 


  


 


Intake Oral 1080 ml 


 


# Voids 2 1








Height (Feet):  5


Height (Inches):  9.00


Weight (Pounds):  149


General Appearance:  alert


Neurologic:  oriented x 3, responsive, depressed affect











Rodri Medina MD Sep 7, 2018 12:55

## 2018-09-07 NOTE — DISCHARGE SUMMARY
Discharge Summary


Hospital Course


Date of Admission


Aug 29, 2018 at 15:48


Date of Discharge


9/7/2018


Admitting Diagnosis


ABDOMINAL PAIN.  RAPID ATRIAL FIB


Reason for Hospitalization:  Acute cholecytitis, atrial fibrillation with rapid 

ventricular response


HPI


Norberto Juarez is a 66 year old male who was admitted on Aug 29, 2018 at 15:48 

for Abdominal Pain,Rapid Atrial Fibrillation


He was found to have acute cholecystitis on labs and and ultrasound and admitted


Consultations


Gastroenterology


Cardiology


General Surgery


Procedures


ERCP


Laparoscopic cholecystectomy


Hospital Course


Patient was in atrial fibrillation with rapid ventricular response when admitted


Cardiology was consulted


Given acute cholecystitis, IV antibiotics were started and General surgery 

consulted


It was agreed to do an ERCP to clear the ducts prior to surgery


Gastroenterology was consulted and an ERCP with stone extraction was performed


Thereafter a cholecystectomy was performed by Dr. Heath


He did well postoperatively with good pain control and controlled atrial 

fibrillation


Given cognitive impairment and episodes of disorientation psychiatry was 

consulted as well





Discharge


Condition Upon Discharge:  stable


Discharge Disposition


Patient was discharged to [facility] (04)


SNF





I spent 40 minutes conducting, performing and coordinating discharge activities

  on this patient


Discharge Diagnoses:  


(1) Choledocholithiasis


(2) Cholecystitis


(3) COPD (chronic obstructive pulmonary disease)


(4) Episode of generalized weakness











Gino Stover MD Sep 7, 2018 14:29

## 2018-09-07 NOTE — GENERAL PROGRESS NOTE
Assessment/Plan


Assessment/Plan


Assesement


- cholecystitis


- choledocholithiasis, s/p ERCP and stone extraction


- anemia


- s/p cholecystectomy





Recommendations


- IVF


- Abx


- follow labs


- diet per surgery


- d/c planning





Subjective


Allergies:  


Coded Allergies:  


     AMPICILLIN (Verified  Allergy, Unknown, 8/3/18)


Uncoded Allergies:  


     PENICILLIN (Allergy, Unknown, 8/3/18)


Subjective


POD #2


s/p cholecystectomy


feels OK


some wound pain


tolerating po





Objective





Last 24 Hour Vital Signs








  Date Time  Temp Pulse Resp B/P (MAP) Pulse Ox O2 Delivery O2 Flow Rate FiO2


 


9/7/18 12:45  85  114/71    


 


9/7/18 12:00  73      


 


9/7/18 12:00 98.6 74 19 118/76 (90) 98   





 98.6       


 


9/7/18 09:00      Room Air  


 


9/7/18 08:46  85  114/71    


 


9/7/18 08:45  85  114/71    


 


9/7/18 08:00 98.0 85 18 114/71 (85) 98   





 98.0       


 


9/7/18 08:00  68      


 


9/7/18 04:00  82      


 


9/7/18 04:00 98.2 59 17 93/46 (62) 95   





 98.2       


 


9/7/18 00:00 98.7 95 17 85/49 (61) 95   





 98.7       


 


9/7/18 00:00  69      


 


9/6/18 21:07  88  113/66    


 


9/6/18 21:07  88  113/66    


 


9/6/18 21:00      Room Air  


 


9/6/18 20:00  99      


 


9/6/18 20:00 98.5 83 20 113/66 (82) 95   





 98.5       


 


9/6/18 17:40  85  125/73    


 


9/6/18 16:13 97.5 85 20 102/71 (81) 97   





 97.5       


 


9/6/18 16:00  55      

















Intake and Output  


 


 9/6/18 9/7/18





 19:00 07:00


 


Intake Total 1080 ml 


 


Balance 1080 ml 


 


  


 


Intake Oral 1080 ml 


 


# Voids 2 1








Height (Feet):  5


Height (Inches):  9.00


Weight (Pounds):  149


Objective


WDWN


NCAT


supple


CTA


RRR


Abd soft NT ND


no edema


nonfocal











Clara Lenz MD Sep 7, 2018 15:43

## 2018-09-07 NOTE — GENERAL PROGRESS NOTE
Progress Note


Progress Note


Surgery:





no acute events.  no n/v/f/c.  tolerating diet.  ambulatory.  pain minimal.  

wounds c/d/i


exam benign.  states he feels well. 





s/p lap derrick, recovering





d/c planning


does not want narcotics.  OTC pain meds prn


wound instructions given to patient


care instructions given to patient


f/u with me in 2 weeks.











Timothy Heath Sep 7, 2018 12:42

## 2022-06-27 NOTE — GENERAL PROGRESS NOTE
Assessment/Plan


Status:  progressing


Assessment/Plan


1) Acute cholecystitis


Choledocholithiasis


r/o Sepsis from a biliary source


-Surgery consulted- recs appreciated


-elevated bilirubin/transaminitis- GI consulted- recs appreciated


-continue IV antibiotics- ciprofloxacin, metronidazole


-check cultures- blood, urine, lactic acid- negative to date


- s/p EGD/ERCP 9/4 - 2 stones extracted


-s/p laparoscopic cholecystectomy- follow postop





2) Chronic systolic and diastolic heart failure


3) Atrial fibrillation with rapid ventricular response


4) CAD s/p PTCA/stent 6 months ago


-Telemetry monitoring


-continue carvedilol, hydralazine, statin


-hold apixaban given surgery- resume when able


-Cardiology consulted- recs appreciated- started diltiazem


-Resume anticoagulation, ASA when able given recent stent and atrial 

fibrillation


-replete K, Mg PRN





5) DARIAN- improving near baseline


-IVF's; likely hypovolemic; doubt sepsis


-avoid nephrotoxins/renally dose meds


-will follow closely





6) Pancytopenia


-Hematology recs appreciated


-monitor for now





7) Acute encephalopathy/cognitive impairment


-Psychiatry recs appreciated


-seroquel increased to 50 tid





DVT Prophylaxis: scd's, IV heparin





Code status: full





Hospital Classification declaration: Based on this initial evaluation, and 

depending on the patient's clinical course, I anticipate that this patient will 

require hospitalization for 2-3 days. 





Disposition: Once the patient is stable to leave the hospital, I anticipate the 

patient will likely be discharged to the following environment:SNF- comes from 

[facility]





I spent 45 minutes on this patient's case, and 23 minutes was dedicated to 

counseling and/or care coordination. 





Time of note may not reflect time of encounter.





Subjective


Date patient seen:  Sep 6, 2018


Time patient seen:  13:33


ROS Limited/Unobtainable:  Yes


Constitutional:  Reports: no symptoms


HEENT:  Reports: no symptoms


Cardiovascular:  Reports: no symptoms


Gastrointestinal/Abdominal:  Reports: no symptoms


Genitourinary:  Reports: no symptoms


Neurologic/Psychiatric:  Reports: no symptoms


Endocrine:  Reports: no symptoms


Hematologic/Lymphatic:  Reports: no symptoms


Allergies:  


Coded Allergies:  


     AMPICILLIN (Verified  Allergy, Unknown, 8/3/18)


Uncoded Allergies:  


     PENICILLIN (Allergy, Unknown, 8/3/18)


All Systems:  reviewed and negative except above


Subjective


LATE ENTRY: patient seen 96


Events of overnight reviewed


Chart reviewed by me


Patient notes mild abdominal pain, well-controlled


Resting in bed





Objective





Last 24 Hour Vital Signs








  Date Time  Temp Pulse Resp B/P (MAP) Pulse Ox O2 Delivery O2 Flow Rate FiO2


 


9/7/18 12:45  85  114/71    


 


9/7/18 12:00 98.6 74 19 118/76 (90) 98   





 98.6       


 


9/7/18 09:00      Room Air  


 


9/7/18 08:46  85  114/71    


 


9/7/18 08:45  85  114/71    


 


9/7/18 08:00 98.0 85 18 114/71 (85) 98   





 98.0       


 


9/7/18 04:00  82      


 


9/7/18 04:00 98.2 59 17 93/46 (62) 95   





 98.2       


 


9/7/18 00:00 98.7 95 17 85/49 (61) 95   





 98.7       


 


9/7/18 00:00  69      


 


9/6/18 21:07  88  113/66    


 


9/6/18 21:07  88  113/66    


 


9/6/18 21:00      Room Air  


 


9/6/18 20:00  99      


 


9/6/18 20:00 98.5 83 20 113/66 (82) 95   





 98.5       


 


9/6/18 17:40  85  125/73    


 


9/6/18 16:13 97.5 85 20 102/71 (81) 97   





 97.5       


 


9/6/18 16:00  55      

















Intake and Output  


 


 9/6/18 9/7/18





 19:00 07:00


 


Intake Total 1080 ml 


 


Balance 1080 ml 


 


  


 


Intake Oral 1080 ml 


 


# Voids 2 1








Height (Feet):  5


Height (Inches):  9.00


Weight (Pounds):  149


General Appearance:  no apparent distress, alert


EENT:  PERRL/EOMI


Neck:  non-tender, supple


Cardiovascular:  normal peripheral pulses, normal rate, irregularly irregular


Respiratory/Chest:  chest wall non-tender, lungs clear


Abdomen:  normal bowel sounds, non tender, soft


Pelvis:  normal external exam


Extremities:  normal range of motion


Edema:  no edema noted Arm (L), no edema noted Arm (R)


Neurologic:  CNs II-XII grossly normal, alert


Skin:  normal pigmentation


Lymphatic:  normal anterior cervical (L), normal anterior cervical (R)











Gino Stover MD Sep 7, 2018 14:31 Glycopyrrolate Counseling:  I discussed with the patient the risks of glycopyrrolate including but not limited to skin rash, drowsiness, dry mouth, difficulty urinating, and blurred vision.